# Patient Record
Sex: MALE | Race: WHITE | NOT HISPANIC OR LATINO | Employment: OTHER | URBAN - NONMETROPOLITAN AREA
[De-identification: names, ages, dates, MRNs, and addresses within clinical notes are randomized per-mention and may not be internally consistent; named-entity substitution may affect disease eponyms.]

---

## 2018-02-15 ENCOUNTER — OFFICE VISIT CONVERTED (OUTPATIENT)
Dept: FAMILY MEDICINE CLINIC | Age: 83
End: 2018-02-15
Attending: NURSE PRACTITIONER

## 2018-12-07 ENCOUNTER — OFFICE VISIT CONVERTED (OUTPATIENT)
Dept: FAMILY MEDICINE CLINIC | Age: 83
End: 2018-12-07
Attending: NURSE PRACTITIONER

## 2019-03-15 ENCOUNTER — HOSPITAL ENCOUNTER (OUTPATIENT)
Dept: RADIATION ONCOLOGY | Facility: HOSPITAL | Age: 84
Discharge: HOME | End: 2019-03-15
Attending: RADIOLOGY

## 2019-03-15 LAB — PSA SERPL-MCNC: 1.11 NG/ML (ref 0–4)

## 2019-05-10 ENCOUNTER — OFFICE VISIT CONVERTED (OUTPATIENT)
Dept: CARDIOLOGY | Facility: CLINIC | Age: 84
End: 2019-05-10
Attending: INTERNAL MEDICINE

## 2020-06-03 ENCOUNTER — HOSPITAL ENCOUNTER (OUTPATIENT)
Dept: RADIATION ONCOLOGY | Facility: HOSPITAL | Age: 85
Discharge: HOME | End: 2020-06-03
Attending: RADIOLOGY

## 2020-06-03 LAB — PSA SERPL-MCNC: 1.21 NG/ML (ref 0–4)

## 2021-05-15 VITALS
SYSTOLIC BLOOD PRESSURE: 131 MMHG | BODY MASS INDEX: 27.77 KG/M2 | HEART RATE: 79 BPM | HEIGHT: 70 IN | DIASTOLIC BLOOD PRESSURE: 62 MMHG | WEIGHT: 194 LBS

## 2021-05-18 NOTE — PROGRESS NOTES
Magan Lubin 1935     Office/Outpatient Visit    Visit Date: Thu, Feb 15, 2018 01:34 pm    Provider: Jo Ann Friend N.P. (Assistant: Amanda Ruiz RN)    Location: [facility]        Electronically signed by Jo Ann Friend N.P. on  02/19/2018 09:58:08 AM                             SUBJECTIVE:        CC:     Aubrey is a 82 year old White male.  Episode of dizziness this morning (DAUGHTER TO CALL US BACK WHERE AND WHEN PT HAD FLU VAC AND PREVNAR)         HPI:         Dizziness noted.  this am at home came out of bathroom after urinating.  picked up newspaper and began to look at it.  felt lightheaded and sat in nearby chair.  room spinning sensation lasted less than 5 minutes.  denies currently.  not sure what caused episode.  he had not eaten yet at that time.  denies nausea, vomiting, fever, recent illness, chest pain, or other symptoms.      ROS:     CONSTITUTIONAL:  Negative for chills, fatigue, fever, and weight change.      EYES:  Negative for blurred vision, eye pain, and photophobia.      E/N/T:  Positive for nasal congestion.   Negative for ear pain, diminished hearing, tinnitus, frequent rhinorrhea or sore throat.      CARDIOVASCULAR:  Positive for dizziness.   Negative for chest pain, palpitations or pedal edema.      RESPIRATORY:  Negative for cough, dyspnea, and hemoptysis.      GASTROINTESTINAL:  Negative for abdominal pain, heartburn, constipation, diarrhea, and stool changes.      GENITOURINARY:  Negative for dysuria.      MUSCULOSKELETAL:  Negative for arthralgias, back pain, and myalgias.      NEUROLOGICAL:  Positive for dizziness and paresthesia ( right lower thigh since knee replacement stable ).   Negative for fainting, headaches, tremor or weakness.          PMH/FMH/SH:     Last Reviewed on 10/19/2017 12:10 PM by Jo Ann Friend    Past Medical History:             PAST MEDICAL HISTORY         Prostate cancer: s/p XRT; released from urology 2016         PAST MEDICAL  "HISTORY         Positive for    Skin cancer: Basal Cell; ;         Surgical History:         Appendectomy: ;     Biopsy of skin; benign; \"fatty tumor on back\"    Hernia Repair    Joint Replacement: left knee; , ; uncomplicated;      open repair incarcerated unbilical hernia 2018;         Family History:     Father:  at age 65;  Cancer (unspecified type) (possible lung or colon)     Mother:  at age 94; Cause of death was age     Sister(s): 4 sister(s) total;  Cancer (unspecified type); Breast Cancer         Social History:         Marital Status:          Tobacco/Alcohol/Supplements:     Last Reviewed on 10/19/2017 11:54 AM by Mariposa Smith    Tobacco: He has a past history of cigarette smoking; quit date:  .          Alcohol: Frequency: Socially             Current Problems:     Artificial joint replacement, Knee     Osteoarthritis of knee     Elevated PSA     Urinary frequency     Elevated fasting glucose         Immunizations:     Fluzone (3 + years dose) 2009     Fluzone High-Dose pf (>=65 yr) 2013     Fluzone High-Dose pf (>=65 yr) 2015     Influenza High-Dose Virus Vaccine pf (>=65 yr) 10/1/2016     PNEUMOVAX 23 (Pneumococcal PPV23) 2013         Allergies:     Last Reviewed on 2/15/2018 01:36 PM by Amanda Ruiz    Penicillins:        Current Medications:     Last Reviewed on 2/15/2018 01:36 PM by Amanda Ruiz    Terazosin 2mg Capsules one a day     Aspirin (ASA) 81mg Chewable Tablet Chew 1 tablet(s) by mouth qam         OBJECTIVE:        Vitals:         Historical:     10/19/2017  BP:   94/51 mm Hg ( (left arm, , sitting, );)     10/19/2017  Wt:   189.4lbs        Current: 2/15/2018 1:36:13 PM    Ht:  5 ft, 9 in;  Wt: 189.5 lbs;  BMI: 28.0    T: 97.4 F (oral);  BP: 125/67 mm Hg (left arm, sitting);  P: 72 bpm (left arm (BP Cuff), sitting)        Exams:     PHYSICAL EXAM:     GENERAL:  well developed and nourished; appropriately groomed; in no " apparent distress;     E/N/T: EARS: left TM is normal and the right TM is has fluid behind it;  NOSE: nasal mucosa is erythematous;  OROPHARYNX: posterior pharynx, including tonsils, tongue, and uvula are normal;     RESPIRATORY: normal respiratory rate and pattern with no distress; normal breath sounds with no rales, rhonchi, wheezes or rubs;     CARDIOVASCULAR: normal rate; rhythm is regular;     LYMPHATIC: no enlargement of cervical or facial nodes;     MUSCULOSKELETAL:  Normal range of motion, strength and tone;     NEUROLOGIC: mental status: alert and oriented x 3; GROSSLY INTACT     PSYCHIATRIC:  appropriate affect and demeanor; normal speech pattern; grossly normal memory;         Lab/Test Results:         LABORATORY RESULTS: EKG performed by atc         ASSESSMENT           780.4   R42  Dizziness              DDx:     780.2   R42  Near-syncope              DDx:     V77.91   Z13.220  Screening for cholesterol level              DDx:         ORDERS:         Meds Prescribed:       Meclizine HCl 25mg Tablet 1/2 - 1 tab BID prn dizziness  #20 (Twenty) tablet(s) Refills: 0         Radiology/Test Orders:       [id number]  12+ -lead ECG tracing w/interp, report  (In-House)           Lab Orders:       [id number]  [facility] Lipid Panel  (Send-Out)         32128  [facility] Basic Metabolic Panel  (Send-Out)                   PLAN:          Dizziness     LABORATORY:  Labs ordered to be performed today include basic metabolic panel.      RECOMMENDATIONS given include: limit salt in diet, rest whenever possible while symptoms persist, no baths unattended, use caution when driving, and meclizine may cause drowsiness.  add flonase nasal spray daily x 4 wks to help with serous otitis.  to ER if chest pain, unilateral weakness or numbness, or any other problems.  ekg consistent to 2013 findings.  consider stress test and echocardiogram if persists..            Prescriptions:       Meclizine HCl 25mg Tablet 1/2 - 1 tab BID prn dizziness   #20 (Twenty) tablet(s) Refills: 0           Orders:       [id number]  [facility] Basic Metabolic Panel  (Send-Out)             Patient Education Handouts:       Vertigo           Near-syncope as above.           Orders:       [id number]  12+ -lead ECG tracing w/interp, report  (In-House)            Screening for cholesterol level     LABORATORY:  Labs ordered to be performed today include lipid panel.            Orders:       [id number]  [facility] Lipid Panel  (Send-Out)               Patient Recommendations:        For  Dizziness:     Get plenty of rest.    Do not take a bath unattended for risk of falling or drowning. Use caution when driving.              CHARGE CAPTURE           **Please note: ICD descriptions below are intended for billing purposes only and may not represent clinical diagnoses**        Primary Diagnosis:         780.4 Dizziness            R42    Dizziness and giddiness              Orders:          13080   Office/outpatient visit; established patient, level 3  (In-House)           780.2 Near-syncope            R42    Dizziness and giddiness              Orders:          51236   12+ -lead ECG tracing w/interp, report  (In-House)           V77.91 Screening for cholesterol level            Z13.220    Encounter for screening for lipoid disorders

## 2021-05-18 NOTE — PROGRESS NOTES
"AmeeMagan 1935     Office/Outpatient Visit    Visit Date: Fri, Dec 7, 2018 03:10 pm    Provider: Jo Ann Friend N.P. (Assistant: John Townsend)    Location: [facility]        Electronically signed by Jo Ann Friend N.P. on  12/10/2018 10:42:17 AM                             SUBJECTIVE:        CC:     Aubrey is a 83 year old White male.  blood in urine         HPI:         noted blood in urine twice yesterday.  passed a small , hard substance.  negative for back pain, abd pain, or any discolored urine today.  [facility]  has not seen this calendar year yet.  denies hx of kidney stones.  states he had radiation for prostate cancer and prostate gland was not surgically removed.      ROS:     CONSTITUTIONAL:  Negative for chills, fatigue, fever, and weight change.      CARDIOVASCULAR:  Negative for chest pain, palpitations, tachycardia, orthopnea, and edema.      RESPIRATORY:  Negative for cough, dyspnea, and hemoptysis.      GASTROINTESTINAL:  Negative for abdominal pain, heartburn, constipation, diarrhea, and stool changes.      GENITOURINARY:  Positive for hematuria.   Negative for dysuria, urinary incontinence or change in urine stream.      MUSCULOSKELETAL:  Negative for back pain.      NEUROLOGICAL:  Negative for dizziness, headaches, paresthesias, and weakness.          PMH/FMH/SH:     Last Reviewed on 2018 03:32 PM by Jo Ann Friend    Past Medical History:             PAST MEDICAL HISTORY         Prostate cancer: s/p XRT; released from urology          PAST MEDICAL HISTORY         Positive for    Skin cancer: Basal Cell; ;         Surgical History:         Appendectomy: ;     Biopsy of skin; benign; \"fatty tumor on back\"    Hernia Repair    Joint Replacement: left knee; , ; uncomplicated;      open repair incarcerated unbilical hernia 2018;         Family History:     Father:  at age 65;  Cancer (unspecified type) (possible " lung or colon)     Mother:  at age 94; Cause of death was age     Sister(s): 4 sister(s) total;  Cancer (unspecified type); Breast Cancer         Social History:         Marital Status:          Tobacco/Alcohol/Supplements:     Last Reviewed on 2018 03:14 PM by John Townsend    Tobacco: He has a past history of cigarette smoking; quit date:  .          Alcohol: Frequency: Socially             Current Problems:     Screening for cholesterol level     Near-syncope     Dizziness     Artificial joint replacement, Knee     Osteoarthritis of knee     Elevated PSA     Urinary frequency     Elevated fasting glucose         Immunizations:     Fluzone (3 + years dose) 2009     Fluzone High-Dose pf (>=65 yr) 2013     Fluzone High-Dose pf (>=65 yr) 2015     Influenza High-Dose Virus Vaccine pf (>=65 yr) 10/1/2016     PNEUMOVAX 23 (Pneumococcal PPV23) 2013         Allergies:     Last Reviewed on 2/15/2018 01:36 PM by Amanda Ruiz    Penicillins:        Current Medications:     Last Reviewed on 2018 03:14 PM by John Townsend    Meclizine HCl 25mg Tablet 1/2 - 1 tab BID prn dizziness     Terazosin 2mg Capsules one a day     Aspirin (ASA) 81mg Chewable Tablet Chew 1 tablet(s) by mouth qam         OBJECTIVE:        Vitals:         Historical:     02/15/2018  BP:   125/67 mm Hg ( (left arm, , sitting, );)     02/15/2018  Wt:   189.5lbs        Current: 2018 3:16:31 PM    Ht:  5 ft, 9 in;  Wt: 193.4 lbs;  BMI: 28.6    T: 97.6 F (oral);  BP: 126/63 mm Hg (left arm, sitting);  P: 63 bpm (left arm (BP Cuff), sitting);  sCr: 0.89 mg/dL;  GFR: 65.07        Exams:     PHYSICAL EXAM:     GENERAL:  well developed and nourished; appropriately groomed; in no apparent distress;     RESPIRATORY: normal respiratory rate and pattern with no distress; normal breath sounds with no rales, rhonchi, wheezes or rubs;     CARDIOVASCULAR: normal rate; rhythm is regular;     GASTROINTESTINAL: rectal  exam: normal tone; no masses; no hemorrhoids; guaiac negative stool;     GENITOURINARY: prostate: no enlargement;     MUSCULOSKELETAL:  Normal range of motion, strength and tone;     NEUROLOGIC: mental status: alert and oriented x 3; GROSSLY INTACT     PSYCHIATRIC:  appropriate affect and demeanor; normal speech pattern; grossly normal memory;         ASSESSMENT           Urine: red - blood    599.70   R31.9  Hematuria, unspecified              DDx:         ORDERS:         Radiology/Test Orders:       [id number]  Radiologic examination, abdomen; single anteroposterior view  (Send-Out)           Lab Orders:       [id number]  Urinalysis, automated, without microscopy  (In-House)         95237  [facility] Urine Culture  (Send-Out)         82702  [facility] CBC with 3 part diff  (Send-Out)                   PLAN:          Hematuria, unspecified     LABORATORY:  Labs ordered to be performed today include CBC.      RADIOLOGY:  I have ordered a KUB to be done today.      RECOMMENDATIONS given include: increase fluid intake and denies any other bleeding.  to ER if worsens.  small amt blood in urine today.   tests pending and follow up with urology..            Orders:       [id number]  Urinalysis, automated, without microscopy  (In-House)         [id number]  [facility] Urine Culture  (Send-Out)         46383  Radiologic examination, abdomen; single anteroposterior view  (Send-Out)         61625  [facility] CBC with 3 part diff  (Send-Out)               Patient Recommendations:        For  Hematuria, unspecified:     Drink plenty of fluids.  Fever increases the loss of fluids and can lead to dehydration.              CHARGE CAPTURE           **Please note: ICD descriptions below are intended for billing purposes only and may not represent clinical diagnoses**        Primary Diagnosis:         Urine: red - blood    599.70 Hematuria, unspecified            R31.9    Hematuria, unspecified              Orders:          68350   Office/outpatient  visit; established patient, level 3  (In-House)             95205   Urinalysis, automated, without microscopy  (In-House)

## 2021-07-01 VITALS
DIASTOLIC BLOOD PRESSURE: 63 MMHG | WEIGHT: 193.4 LBS | SYSTOLIC BLOOD PRESSURE: 126 MMHG | HEART RATE: 63 BPM | BODY MASS INDEX: 28.64 KG/M2 | HEIGHT: 69 IN | TEMPERATURE: 97.6 F

## 2021-07-01 VITALS
DIASTOLIC BLOOD PRESSURE: 67 MMHG | HEART RATE: 72 BPM | SYSTOLIC BLOOD PRESSURE: 125 MMHG | WEIGHT: 189.5 LBS | BODY MASS INDEX: 28.07 KG/M2 | HEIGHT: 69 IN | TEMPERATURE: 97.4 F

## 2022-01-28 ENCOUNTER — OFFICE VISIT (OUTPATIENT)
Dept: FAMILY MEDICINE CLINIC | Age: 87
End: 2022-01-28

## 2022-01-28 ENCOUNTER — TELEPHONE (OUTPATIENT)
Dept: FAMILY MEDICINE CLINIC | Age: 87
End: 2022-01-28

## 2022-01-28 VITALS
HEART RATE: 70 BPM | WEIGHT: 147 LBS | BODY MASS INDEX: 21.77 KG/M2 | SYSTOLIC BLOOD PRESSURE: 132 MMHG | OXYGEN SATURATION: 95 % | TEMPERATURE: 97.7 F | HEIGHT: 69 IN | DIASTOLIC BLOOD PRESSURE: 63 MMHG

## 2022-01-28 DIAGNOSIS — T63.301A SPIDER BITE WOUND, ACCIDENTAL OR UNINTENTIONAL, INITIAL ENCOUNTER: Primary | ICD-10-CM

## 2022-01-28 DIAGNOSIS — T63.301A SPIDER BITE WOUND, ACCIDENTAL OR UNINTENTIONAL, INITIAL ENCOUNTER: ICD-10-CM

## 2022-01-28 PROCEDURE — 99212 OFFICE O/P EST SF 10 MIN: CPT | Performed by: NURSE PRACTITIONER

## 2022-01-28 RX ORDER — SULFAMETHOXAZOLE AND TRIMETHOPRIM 800; 160 MG/1; MG/1
1 TABLET ORAL 2 TIMES DAILY
Qty: 10 TABLET | Refills: 0 | Status: SHIPPED | OUTPATIENT
Start: 2022-01-28 | End: 2022-01-31 | Stop reason: SDUPTHER

## 2022-01-28 NOTE — PROGRESS NOTES
"Chief Complaint  Insect Bite (underneath left eye)    Subjective    Patient is a 86 year old who was bite by small white spider 3 days ago. Patient reports area was draining but has now scabbed over. Area is very tender to tough. Denies fever or changes in vision at this time.          Magan Lubin presents to [facility]  Tick Removal  This is a new problem. The current episode started in the past 7 days. Pertinent negatives include no chills or fever.       Objective   Vital Signs:   /63 (BP Location: Right arm, Patient Position: Sitting, Cuff Size: Adult)   Pulse 70   Temp 97.7 °F (36.5 °C) (Oral)   Ht 175.3 cm (69\")   Wt 66.7 kg (147 lb)   SpO2 95%   BMI 21.71 kg/m²     Physical Exam  HENT:      Head: Normocephalic.   Cardiovascular:      Rate and Rhythm: Normal rate.   Pulmonary:      Effort: Pulmonary effort is normal.      Breath sounds: Normal breath sounds.   Skin:     General: Skin is warm and dry.      Findings: Erythema present.          Neurological:      Mental Status: He is alert and oriented to person, place, and time.   Psychiatric:         Mood and Affect: Mood normal.                Media Information                 Result Review :                 Assessment and Plan    Diagnoses and all orders for this visit:    1. Spider bite wound, accidental or unintentional, initial encounter (Primary)  Comments:  Monitor area for increased redness, warmth.  Follow-up on Monday if no improvement  Orders:  -     sulfamethoxazole-trimethoprim (Bactrim DS) 800-160 MG per tablet; Take 1 tablet by mouth 2 (Two) Times a Day for 5 days.  Dispense: 10 tablet; Refill: 0        Follow Up   Return in about 3 days (around 1/31/2022), or if symptoms worsen or fail to improve.  Patient was given instructions and counseling regarding his condition or for health maintenance advice. Please see specific information pulled into the AVS if appropriate.       "

## 2022-02-02 ENCOUNTER — TELEPHONE (OUTPATIENT)
Dept: FAMILY MEDICINE CLINIC | Age: 87
End: 2022-02-02

## 2022-02-02 RX ORDER — SULFAMETHOXAZOLE AND TRIMETHOPRIM 800; 160 MG/1; MG/1
1 TABLET ORAL 2 TIMES DAILY
Qty: 6 TABLET | Refills: 0 | Status: SHIPPED | OUTPATIENT
Start: 2022-02-02 | End: 2022-02-05

## 2022-02-02 NOTE — TELEPHONE ENCOUNTER
Caller: JONTE    Relationship: Emergency Contact    Best call back number: [phone]    Requested Prescriptions:   Requested Prescriptions      No prescriptions requested or ordered in this encounter        Pharmacy where request should be sent: [facility] - [address] [phone] [facility] [phone] FX     Additional details provided by patient: PATIENT RECEIVED MEDICATION FOR A SPIDER BITE THAT HAS NOT FULLY RECOVERED. THEY ARE REQUESTING ANOTHER REFILL OF MEDICATION    Does the patient have less than a 3 day supply:  [x] Yes  [] No    Kavya Trevizo Rep   02/02/22 13:50 EST

## 2022-03-11 ENCOUNTER — OFFICE VISIT (OUTPATIENT)
Dept: FAMILY MEDICINE CLINIC | Age: 87
End: 2022-03-11

## 2022-03-11 ENCOUNTER — HOSPITAL ENCOUNTER (OUTPATIENT)
Dept: CT IMAGING | Facility: HOSPITAL | Age: 87
Discharge: HOME | End: 2022-03-11
Admitting: NURSE PRACTITIONER

## 2022-03-11 VITALS
SYSTOLIC BLOOD PRESSURE: 133 MMHG | HEART RATE: 64 BPM | WEIGHT: 187 LBS | DIASTOLIC BLOOD PRESSURE: 75 MMHG | OXYGEN SATURATION: 95 % | HEIGHT: 69 IN | BODY MASS INDEX: 27.7 KG/M2

## 2022-03-11 DIAGNOSIS — R51.9 ACUTE NONINTRACTABLE HEADACHE, UNSPECIFIED HEADACHE TYPE: Primary | ICD-10-CM

## 2022-03-11 DIAGNOSIS — R51.9 ACUTE NONINTRACTABLE HEADACHE, UNSPECIFIED HEADACHE TYPE: ICD-10-CM

## 2022-03-11 PROCEDURE — 99213 OFFICE O/P EST LOW 20 MIN: CPT | Performed by: NURSE PRACTITIONER

## 2022-03-11 PROCEDURE — 70450 CT HEAD/BRAIN W/O DYE: CPT

## 2022-03-11 RX ORDER — TERAZOSIN 2 MG/1
2 CAPSULE ORAL NIGHTLY
COMMUNITY
Start: 2022-03-02 | End: 2022-08-25 | Stop reason: SDUPTHER

## 2022-03-11 RX ORDER — ASPIRIN 81 MG/1
81 TABLET ORAL DAILY
COMMUNITY

## 2022-03-11 NOTE — PROGRESS NOTES
"Chief Complaint  Headache (Sharp pain in head for 3-4 days )    Subjective  Patient is an 86-year-old male who is here today to discuss intermittent sharp headache pain.  He first noticed it 4 days ago with no particular trigger.  Sharp pain on the top of his head that lasts for about 1 minute.  He is not in pain at time of exam today.  When the pain occurs it is moderate to severe.  He denies any unilateral weakness or numbness, slurred speech, numbness or tingling, vision changes, or having the worst headache of his life.  He takes aspirin 81 mg intermittently.        Magan Lubin presents to [facility]    Review of Systems   Constitutional: Negative.    Respiratory: Negative.    Cardiovascular: Negative.    Musculoskeletal: Negative.    Neurological: Negative for dizziness, tremors, seizures, syncope, facial asymmetry, speech difficulty, weakness, light-headedness and numbness.        Objective   Vital Signs:   Vitals:    03/11/22 1504   BP: 133/75   BP Location: Right arm   Patient Position: Sitting   Cuff Size: Adult   Pulse: 64   SpO2: 95%   Weight: 84.8 kg (187 lb)   Height: 175.3 cm (69\")      Physical Exam  Vitals reviewed.   Constitutional:       General: He is not in acute distress.     Appearance: Normal appearance. He is well-developed.   HENT:      Right Ear: Tympanic membrane normal.      Left Ear: A middle ear effusion is present.   Eyes:      Extraocular Movements: Extraocular movements intact.      Pupils: Pupils are equal, round, and reactive to light.   Cardiovascular:      Rate and Rhythm: Normal rate and regular rhythm.      Heart sounds: Normal heart sounds.   Pulmonary:      Effort: Pulmonary effort is normal.      Breath sounds: Normal breath sounds.   Musculoskeletal:      Right lower leg: No edema.      Left lower leg: No edema.   Skin:     General: Skin is warm and dry.   Neurological:      General: No focal deficit present.      Mental Status: He is " alert.   Psychiatric:         Attention and Perception: Attention normal.         Mood and Affect: Mood and affect normal.         Behavior: Behavior normal.          Result Review :                Assessment and Plan    Diagnoses and all orders for this visit:    1. Acute nonintractable headache, unspecified headache type (Primary)  Assessment & Plan:  Pain is not current at this time.  But new complaint of intermittent sharp headache pain.  Need to obtain CT of the head without contrast.  He is instructed to go to the nearest emergency room or call 911 in the meantime if his symptoms worsen, if he has the worst headache of his life.  Experiences unilateral weakness or numbness, or slurred speech.  Further treatment are pending head CT results.  If CT is negative consider other factors such as nasal congestion runny nose, neck pain or strain.  May take extra strength Tylenol as needed.  Does have some mild eustachian tube inflammation due to fluid behind the left tympanic membrane.  He also reports some seasonal allergies.  Okay to take plain Claritin.    Orders:  -     CT Head Without Contrast; Future      Follow Up    No follow-ups on file.  Patient was given instructions and counseling regarding his condition or for health maintenance advice. Please see specific information pulled into the AVS if appropriate.

## 2022-03-11 NOTE — PROGRESS NOTES
No acute findings.  Attempted to call result to patient.  No answer.  Left message to call office.  He needs to follow up with vascular about stenosis of carotid arteries.

## 2022-03-11 NOTE — ASSESSMENT & PLAN NOTE
Pain is not current at this time.  But new complaint of intermittent sharp headache pain.  Need to obtain CT of the head without contrast.  He is instructed to go to the nearest emergency room or call 911 in the meantime if his symptoms worsen, if he has the worst headache of his life.  Experiences unilateral weakness or numbness, or slurred speech.  Further treatment are pending head CT results.  If CT is negative consider other factors such as nasal congestion runny nose, neck pain or strain.  May take extra strength Tylenol as needed.  Does have some mild eustachian tube inflammation due to fluid behind the left tympanic membrane.  He also reports some seasonal allergies.  Okay to take plain Claritin.

## 2022-03-13 DIAGNOSIS — R51.9 ACUTE NONINTRACTABLE HEADACHE, UNSPECIFIED HEADACHE TYPE: Primary | ICD-10-CM

## 2022-03-13 DIAGNOSIS — I65.23 CAROTID ARTERY CALCIFICATION, BILATERAL: ICD-10-CM

## 2022-03-13 NOTE — PROGRESS NOTES
Please call patient.  No acute changes.  Marked carotid artery calcifications.  I have ordered referral to vascular to further evaluate.  He  has seen vascular about carotid arteries in the past but needs to see again.  Intend for him to be seen within one week if possible.

## 2022-03-14 ENCOUNTER — TELEPHONE (OUTPATIENT)
Dept: FAMILY MEDICINE CLINIC | Age: 87
End: 2022-03-14

## 2022-03-14 NOTE — TELEPHONE ENCOUNTER
Caller: Magan Lubin    Relationship: Self    Best call back number: [phone]    What is the best time to reach you: ANYTIME    Who are you requesting to speak with (clinical staff, provider,  specific staff member): VIKTOR    What was the call regarding: PATIENTS DAUGHTER STATES VASCULAR SURGEON. NEED HER TO SEND PERTINENT INFORMATION.    DR:     FRANKIE MAYER  APPT: Thursday 4/7/2022 AT 9 AM  FAX:    [phone]    Do you require a callback: IF NEEDED

## 2022-03-17 ENCOUNTER — HOSPITAL ENCOUNTER (OUTPATIENT)
Dept: CARDIOLOGY | Facility: HOSPITAL | Age: 87
Discharge: HOME | End: 2022-03-17
Admitting: NURSE PRACTITIONER

## 2022-03-17 ENCOUNTER — TELEPHONE (OUTPATIENT)
Dept: FAMILY MEDICINE CLINIC | Age: 87
End: 2022-03-17

## 2022-03-17 DIAGNOSIS — R51.9 ACUTE NONINTRACTABLE HEADACHE, UNSPECIFIED HEADACHE TYPE: ICD-10-CM

## 2022-03-17 DIAGNOSIS — R51.9 ACUTE NONINTRACTABLE HEADACHE, UNSPECIFIED HEADACHE TYPE: Primary | ICD-10-CM

## 2022-03-17 DIAGNOSIS — I79.8 OTHER DISORDERS OF ARTERIES, ARTERIOLES AND CAPILLARIES IN DISEASES CLASSIFIED ELSEWHERE: ICD-10-CM

## 2022-03-17 DIAGNOSIS — I65.29 CAROTID ARTERY CALCIFICATION, UNSPECIFIED LATERALITY: ICD-10-CM

## 2022-03-17 DIAGNOSIS — I72.0 CAROTID ARTERY ANEURYSM: ICD-10-CM

## 2022-03-17 LAB
BH CV XLRA MEAS LEFT CAROTID BULB EDV: 9 CM/SEC
BH CV XLRA MEAS LEFT CAROTID BULB PSV: 40 CM/SEC
BH CV XLRA MEAS LEFT DIST CCA EDV: 20 CM/SEC
BH CV XLRA MEAS LEFT DIST CCA PSV: 82 CM/SEC
BH CV XLRA MEAS LEFT DIST ICA EDV: 16 CM/SEC
BH CV XLRA MEAS LEFT DIST ICA PSV: 45 CM/SEC
BH CV XLRA MEAS LEFT MID ICA EDV: 15 CM/SEC
BH CV XLRA MEAS LEFT MID ICA PSV: 75 CM/SEC
BH CV XLRA MEAS LEFT PROX CCA EDV: 17 CM/SEC
BH CV XLRA MEAS LEFT PROX CCA PSV: 109 CM/SEC
BH CV XLRA MEAS LEFT PROX ECA EDV: 13 CM/SEC
BH CV XLRA MEAS LEFT PROX ECA PSV: 94 CM/SEC
BH CV XLRA MEAS LEFT PROX ICA EDV: 66 CM/SEC
BH CV XLRA MEAS LEFT PROX ICA PSV: 207 CM/SEC
BH CV XLRA MEAS LEFT VERTEBRAL A EDV: 11 CM/SEC
BH CV XLRA MEAS LEFT VERTEBRAL A PSV: 42 CM/SEC
BH CV XLRA MEAS RIGHT CAROTID BULB EDV: 25 CM/SEC
BH CV XLRA MEAS RIGHT CAROTID BULB PSV: 104 CM/SEC
BH CV XLRA MEAS RIGHT DIST CCA EDV: 19 CM/SEC
BH CV XLRA MEAS RIGHT DIST CCA PSV: 82 CM/SEC
BH CV XLRA MEAS RIGHT DIST ICA EDV: 22 CM/SEC
BH CV XLRA MEAS RIGHT DIST ICA PSV: 69 CM/SEC
BH CV XLRA MEAS RIGHT MID ICA EDV: 21 CM/SEC
BH CV XLRA MEAS RIGHT MID ICA PSV: 72 CM/SEC
BH CV XLRA MEAS RIGHT PROX CCA EDV: 15 CM/SEC
BH CV XLRA MEAS RIGHT PROX CCA PSV: 119 CM/SEC
BH CV XLRA MEAS RIGHT PROX ECA EDV: 11 CM/SEC
BH CV XLRA MEAS RIGHT PROX ECA PSV: 112 CM/SEC
BH CV XLRA MEAS RIGHT PROX ICA EDV: 39 CM/SEC
BH CV XLRA MEAS RIGHT PROX ICA PSV: 119 CM/SEC
BH CV XLRA MEAS RIGHT VERTEBRAL A EDV: 9 CM/SEC
BH CV XLRA MEAS RIGHT VERTEBRAL A PSV: 46 CM/SEC
LEFT ARM BP: NORMAL MMHG
MAXIMAL PREDICTED HEART RATE: 136 BPM
RIGHT ARM BP: NORMAL MMHG
STRESS TARGET HR: 116 BPM

## 2022-03-17 PROCEDURE — 93880 EXTRACRANIAL BILAT STUDY: CPT

## 2022-03-17 PROCEDURE — 93880 EXTRACRANIAL BILAT STUDY: CPT | Performed by: SURGERY

## 2022-03-17 NOTE — TELEPHONE ENCOUNTER
Caller: TE WEBB    Relationship: Emergency Contact    Best call back number: [phone]    What is the best time to reach you: ANYTIME    Who are you requesting to speak with (clinical staff, provider,  specific staff member): VIKTOR    What was the call regarding: PATIENTS WIFE STATES HER  HAS ARTERIAL BLOCKAGE WHICH WAS CONFIRMED BY [facility]. PATIENTS WIFE STATES SHE WOULD LIKE A CALL BACK TO DISCUSS PATIENTS FUTURE CARE.    Do you require a callback: YES

## 2022-03-18 NOTE — PROGRESS NOTES
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  No stenosis of right carotid.  Heavy plaque build up at bifurcation.  Ok to see if vascular could see him sooner or refer to different vascular provider.

## 2022-03-24 ENCOUNTER — TELEPHONE (OUTPATIENT)
Dept: FAMILY MEDICINE CLINIC | Age: 87
End: 2022-03-24

## 2022-03-24 NOTE — TELEPHONE ENCOUNTER
.  Caller: TE WEBB    Relationship: Emergency Contact    Best call back number: [phone]    What is the best time to reach you: ANYTIME    Who are you requesting to speak with (clinical staff, provider,  specific staff member): CLINICAL    What was the call regarding: PATIENT'S WIFE CALLED SAYING THAT SHE RECEIVED LETTER FROM OFFICE FOR PATIENT REGARDING GETTING AN APPOINTMENT SCHEDULED WITH A VASCULAR DOCTOR. HE IS ALREADY SCHEDULED WITH ONE IN [place] AND HE IS SCHEDULED FOR 2:40 ON 03/29/2022.    Do you require a callback: IF NEEDED

## 2022-04-06 DIAGNOSIS — I65.23 BILATERAL CAROTID ARTERY STENOSIS: Primary | ICD-10-CM

## 2022-04-18 ENCOUNTER — TELEPHONE (OUTPATIENT)
Dept: FAMILY MEDICINE CLINIC | Age: 87
End: 2022-04-18

## 2022-04-18 NOTE — TELEPHONE ENCOUNTER
----- Message from Love Bejarano LPN sent at 4/18/2022  9:02 AM EDT -----      ----- Message -----  From: SYSTEM  Sent: 4/16/2022   1:13 AM EDT  To: [facility] [place] [facility] [place]

## 2022-04-18 NOTE — TELEPHONE ENCOUNTER
Called patient no answer, unable to leave voicemail per verbal release.     Okay to tell patient he is over due for lab work. luke

## 2022-04-18 NOTE — TELEPHONE ENCOUNTER
Spoke with patient wife, states they have had a lot going on but will have patient to complete overdue labs within the next 1.5 weeks. luke

## 2022-04-18 NOTE — TELEPHONE ENCOUNTER
----- Message from Love Bejarano LPN sent at 4/18/2022  9:02 AM EDT -----      ----- Message -----  From: SYSTEM  Sent: 4/16/2022   1:13 AM EDT  To: [facility] [place] [facility] [place]

## 2022-04-22 ENCOUNTER — TELEPHONE (OUTPATIENT)
Dept: FAMILY MEDICINE CLINIC | Age: 87
End: 2022-04-22

## 2022-04-22 NOTE — TELEPHONE ENCOUNTER
Caller: TE WEBB    Relationship: Emergency Contact    Best call back number: [phone]         What was the call regarding: TE REPORTS THAT PATIENT WILL GET LAB WORK FIRST WEEK OF MAY -- PATIENT HAS BEEN HELPING HER WITH HER OWN MEDICAL ISSUES    Do you require a callback: NO

## 2022-06-02 ENCOUNTER — HOSPITAL ENCOUNTER (OUTPATIENT)
Dept: GENERAL RADIOLOGY | Facility: HOSPITAL | Age: 87
Discharge: HOME | End: 2022-06-02
Admitting: NURSE PRACTITIONER

## 2022-06-02 ENCOUNTER — OFFICE VISIT (OUTPATIENT)
Dept: FAMILY MEDICINE CLINIC | Age: 87
End: 2022-06-02

## 2022-06-02 VITALS
SYSTOLIC BLOOD PRESSURE: 141 MMHG | WEIGHT: 183 LBS | DIASTOLIC BLOOD PRESSURE: 84 MMHG | HEART RATE: 70 BPM | TEMPERATURE: 98.8 F | BODY MASS INDEX: 27.02 KG/M2 | OXYGEN SATURATION: 97 %

## 2022-06-02 DIAGNOSIS — J06.9 UPPER RESPIRATORY TRACT INFECTION, UNSPECIFIED TYPE: Primary | ICD-10-CM

## 2022-06-02 DIAGNOSIS — R05.9 COUGH: ICD-10-CM

## 2022-06-02 LAB
EXPIRATION DATE: NORMAL
FLUAV AG NPH QL: NEGATIVE
FLUBV AG NPH QL: NEGATIVE
INTERNAL CONTROL: NORMAL
Lab: NORMAL

## 2022-06-02 PROCEDURE — 87804 INFLUENZA ASSAY W/OPTIC: CPT | Performed by: NURSE PRACTITIONER

## 2022-06-02 PROCEDURE — 99212 OFFICE O/P EST SF 10 MIN: CPT | Performed by: NURSE PRACTITIONER

## 2022-06-02 PROCEDURE — 71046 X-RAY EXAM CHEST 2 VIEWS: CPT

## 2022-06-02 RX ORDER — DOXYCYCLINE HYCLATE 100 MG/1
100 CAPSULE ORAL 2 TIMES DAILY
Qty: 20 CAPSULE | Refills: 0 | Status: SHIPPED | OUTPATIENT
Start: 2022-06-02 | End: 2022-06-12

## 2022-06-02 RX ORDER — ATORVASTATIN CALCIUM 10 MG/1
10 TABLET, FILM COATED ORAL DAILY
COMMUNITY
Start: 2022-03-30 | End: 2022-08-25 | Stop reason: SDUPTHER

## 2022-06-02 RX ORDER — DEXTROMETHORPHAN HYDROBROMIDE AND PROMETHAZINE HYDROCHLORIDE 15; 6.25 MG/5ML; MG/5ML
5 SYRUP ORAL 4 TIMES DAILY PRN
Qty: 118 ML | Refills: 0 | Status: SHIPPED | OUTPATIENT
Start: 2022-06-02 | End: 2022-08-25

## 2022-06-02 NOTE — PROGRESS NOTES
Chief Complaint  URI (Cough, congestion x 4 days, pt daughter is here with him today and states she done an at home covid test today and it was negative. )    Subjective        Magan Lubin presents to [facility]  URI   This is a new problem. The current episode started in the past 7 days. The problem has been gradually worsening. There has been no fever. Associated symptoms include coughing, rhinorrhea, sinus pain and a sore throat. Pertinent negatives include no ear pain, nausea or vomiting. He has tried acetaminophen for the symptoms. The treatment provided no relief.       Objective   Vital Signs:  /84 (BP Location: Left arm, Patient Position: Sitting)   Pulse 70   Temp 98.8 °F (37.1 °C) (Oral)   Wt 83 kg (183 lb)   SpO2 97%   BMI 27.02 kg/m²     BMI has not been calculated during today's encounter.       Physical Exam  HENT:      Head: Normocephalic.      Nose: Congestion present.      Mouth/Throat:      Pharynx: Posterior oropharyngeal erythema present. No oropharyngeal exudate.   Eyes:      General: Allergic shiner present.   Cardiovascular:      Rate and Rhythm: Normal rate and regular rhythm.   Pulmonary:      Effort: Pulmonary effort is normal. No respiratory distress.      Breath sounds: No stridor. Rhonchi present. No wheezing or rales.   Skin:     General: Skin is warm and dry.   Neurological:      Mental Status: He is alert and oriented to person, place, and time.   Psychiatric:         Mood and Affect: Mood normal.        Result Review :                Assessment and Plan   Diagnoses and all orders for this visit:    1. Upper respiratory tract infection, unspecified type (Primary)  Comments:  Rapid flu negative  Orders:  -     doxycycline (VIBRAMYCIN) 100 MG capsule; Take 1 capsule by mouth 2 (Two) Times a Day for 10 days.  Dispense: 20 capsule; Refill: 0    2. Cough  -     POCT Influenza A/B  -     XR Chest PA & Lateral; Future  -      promethazine-dextromethorphan (PROMETHAZINE-DM) 6.25-15 MG/5ML syrup; Take 5 mL by mouth 4 (Four) Times a Day As Needed for Cough.  Dispense: 118 mL; Refill: 0             Follow Up   Return if symptoms worsen or fail to improve.  Patient was given instructions and counseling regarding his condition or for health maintenance advice. Please see specific information pulled into the AVS if appropriate.

## 2022-06-03 ENCOUNTER — TELEPHONE (OUTPATIENT)
Dept: FAMILY MEDICINE CLINIC | Age: 87
End: 2022-06-03

## 2022-06-03 NOTE — TELEPHONE ENCOUNTER
Caller: TE WEBB    Relationship: Emergency Contact    Best call back number: [phone]        What test was performed: CHEST X-RAY    When was the test performed: 06/02/22    Where was the test performed: [facility]    Additional notes: THE PATIENT'S WIFE WOULD LIKE A CALL BACK TO DISCUSS TEST RESULTS ASAP

## 2022-06-03 NOTE — TELEPHONE ENCOUNTER
Inf wife that LJ is out of the office, but the xray didn't show any infection, but will call them next week with the actual interpretation from [facility].  She states that he is doing better today

## 2022-06-06 NOTE — PROGRESS NOTES
Please call and check on patient to see how he is feeling and if his shortness of breath has improved. I recommend two week follow up with PCP to discuss possible chest CT.

## 2022-06-07 ENCOUNTER — LAB (OUTPATIENT)
Dept: LAB | Facility: HOSPITAL | Age: 87
End: 2022-06-07

## 2022-06-07 DIAGNOSIS — I65.23 BILATERAL CAROTID ARTERY STENOSIS: ICD-10-CM

## 2022-06-07 LAB
ALBUMIN SERPL-MCNC: 4.1 G/DL (ref 3.5–5.2)
ALBUMIN/GLOB SERPL: 1.3 G/DL
ALP SERPL-CCNC: 88 U/L (ref 39–117)
ALT SERPL W P-5'-P-CCNC: 20 U/L (ref 1–41)
ANION GAP SERPL CALCULATED.3IONS-SCNC: 9.8 MMOL/L (ref 5–15)
AST SERPL-CCNC: 22 U/L (ref 1–40)
BILIRUB SERPL-MCNC: 0.5 MG/DL (ref 0–1.2)
BUN SERPL-MCNC: 21 MG/DL (ref 8–23)
BUN/CREAT SERPL: 23.3 (ref 7–25)
CALCIUM SPEC-SCNC: 9.7 MG/DL (ref 8.6–10.5)
CHLORIDE SERPL-SCNC: 105 MMOL/L (ref 98–107)
CHOLEST SERPL-MCNC: 123 MG/DL (ref 0–200)
CO2 SERPL-SCNC: 23.2 MMOL/L (ref 22–29)
CREAT SERPL-MCNC: 0.9 MG/DL (ref 0.76–1.27)
EGFRCR SERPLBLD CKD-EPI 2021: 84.2 ML/MIN/1.73
GLOBULIN UR ELPH-MCNC: 3.2 GM/DL
GLUCOSE SERPL-MCNC: 95 MG/DL (ref 65–99)
HDLC SERPL-MCNC: 40 MG/DL (ref 40–60)
LDLC SERPL CALC-MCNC: 71 MG/DL (ref 0–100)
LDLC/HDLC SERPL: 1.83 {RATIO}
POTASSIUM SERPL-SCNC: 4.7 MMOL/L (ref 3.5–5.2)
PROT SERPL-MCNC: 7.3 G/DL (ref 6–8.5)
SODIUM SERPL-SCNC: 138 MMOL/L (ref 136–145)
TRIGL SERPL-MCNC: 50 MG/DL (ref 0–150)
VLDLC SERPL-MCNC: 12 MG/DL (ref 5–40)

## 2022-06-07 PROCEDURE — 80053 COMPREHEN METABOLIC PANEL: CPT

## 2022-06-07 PROCEDURE — 36415 COLL VENOUS BLD VENIPUNCTURE: CPT

## 2022-06-07 PROCEDURE — 80061 LIPID PANEL: CPT

## 2022-08-25 ENCOUNTER — OFFICE VISIT (OUTPATIENT)
Dept: FAMILY MEDICINE CLINIC | Age: 87
End: 2022-08-25

## 2022-08-25 VITALS
SYSTOLIC BLOOD PRESSURE: 114 MMHG | HEIGHT: 69 IN | HEART RATE: 64 BPM | WEIGHT: 180 LBS | OXYGEN SATURATION: 96 % | DIASTOLIC BLOOD PRESSURE: 57 MMHG | BODY MASS INDEX: 26.66 KG/M2

## 2022-08-25 DIAGNOSIS — Z85.46 HISTORY OF PROSTATE CANCER: Primary | ICD-10-CM

## 2022-08-25 DIAGNOSIS — I65.23 ASYMPTOMATIC BILATERAL CAROTID ARTERY STENOSIS: ICD-10-CM

## 2022-08-25 DIAGNOSIS — N40.0 BENIGN PROSTATIC HYPERPLASIA WITHOUT LOWER URINARY TRACT SYMPTOMS: ICD-10-CM

## 2022-08-25 PROBLEM — I73.9 PERIPHERAL VASCULAR DISEASE WITH CLAUDICATION: Status: ACTIVE | Noted: 2022-03-30

## 2022-08-25 PROBLEM — I73.9 PERIPHERAL VASCULAR DISEASE WITH CLAUDICATION (HCC): Status: RESOLVED | Noted: 2022-03-30 | Resolved: 2022-08-25

## 2022-08-25 PROBLEM — I65.29 ASYMPTOMATIC CAROTID ARTERY STENOSIS: Status: ACTIVE | Noted: 2022-03-30

## 2022-08-25 PROBLEM — R51.9 ACUTE NONINTRACTABLE HEADACHE: Status: RESOLVED | Noted: 2022-03-11 | Resolved: 2022-08-25

## 2022-08-25 PROCEDURE — 99213 OFFICE O/P EST LOW 20 MIN: CPT | Performed by: NURSE PRACTITIONER

## 2022-08-25 RX ORDER — TERAZOSIN 2 MG/1
2 CAPSULE ORAL NIGHTLY
Qty: 90 CAPSULE | Refills: 1 | Status: SHIPPED | OUTPATIENT
Start: 2022-08-25 | End: 2022-08-29 | Stop reason: SDUPTHER

## 2022-08-25 RX ORDER — ATORVASTATIN CALCIUM 10 MG/1
10 TABLET, FILM COATED ORAL DAILY
Qty: 90 TABLET | Refills: 1 | Status: SHIPPED | OUTPATIENT
Start: 2022-08-25

## 2022-08-25 NOTE — ASSESSMENT & PLAN NOTE
There is preventive benefit and continuing statin therapy.  He is tolerating current treatment without side effects.  I will be happy to continue refills of this medication.

## 2022-08-25 NOTE — ASSESSMENT & PLAN NOTE
Further treatment pending PSA results and I agree seeing a urologist at least on an annual basis or more often would be recommended.  Refills of terazosin are provided today.

## 2022-08-25 NOTE — PROGRESS NOTES
"Chief Complaint  referral to urology  (Used to see Dr. Garsia at [facility] ) and Discuss medication (Patient is wanting to discuss atorvastatin )    Subjective  Patient is an 87-year-old male who is here today with his wife.  Treated for prostate cancer with radiation little more than 2 years ago.  His radiation oncologist,  Dr. Hairston,  they believe has retired and they need someone to continue prescribing terazosin 2 mg at bedtime.  His last PSA level was done June 2021.  He reports great benefit from taking terazosin and denies any difficulty with starting or stopping his urinary stream when taken.  He denies side effects and requests refills.  Does not mind seeing a urologist at least on an annual basis.    Regarding asymptomatic carotid artery stenosis, he was started on atorvastatin 10 mg daily.  He denies medication side effects.  Would like to start getting refills from this office.  Vascular specialist started this prescription and he last had a lipid panel drawn in June of this year.  Lipid panel normal.    He and wife state that he feels good and denies any concerns today.        Magan Lubin presents to [facility]          Objective   Vital Signs:   Vitals:    08/25/22 1426   BP: 114/57   BP Location: Right arm   Patient Position: Sitting   Cuff Size: Adult   Pulse: 64   SpO2: 96%   Weight: 81.6 kg (180 lb)   Height: 175.3 cm (69\")      Body mass index is 26.58 kg/m².  Physical Exam  Vitals reviewed.   Constitutional:       General: He is not in acute distress.     Appearance: Normal appearance. He is well-developed.   Cardiovascular:      Rate and Rhythm: Normal rate and regular rhythm.      Heart sounds: Normal heart sounds.   Pulmonary:      Effort: Pulmonary effort is normal.      Breath sounds: Normal breath sounds.   Musculoskeletal:      Right lower leg: No edema.      Left lower leg: No edema.   Skin:     General: Skin is warm and dry.   Neurological:      General: " No focal deficit present.      Mental Status: He is alert.   Psychiatric:         Attention and Perception: Attention normal.         Mood and Affect: Mood and affect normal.         Behavior: Behavior normal.          Result Review :     CMP    CMP 6/7/22   Glucose 95   BUN 21   Creatinine 0.90   Sodium 138   Potassium 4.7   Chloride 105   Calcium 9.7   Albumin 4.10   Total Bilirubin 0.5   Alkaline Phosphatase 88   AST (SGOT) 22   ALT (SGPT) 20                 Lipid Panel    Lipid Panel 6/7/22   Total Cholesterol 123   Triglycerides 50   HDL Cholesterol 40   VLDL Cholesterol 12   LDL Cholesterol  71   LDL/HDL Ratio 1.83                        Assessment and Plan    Diagnoses and all orders for this visit:    1. History of prostate cancer (Primary)  Assessment & Plan:  Further treatment pending PSA results and I agree seeing a urologist at least on an annual basis or more often would be recommended.  Refills of terazosin are provided today.    Orders:  -     PSA DIAGNOSTIC ONLY; Future  -     Ambulatory Referral to Urology    2. Benign prostatic hyperplasia without lower urinary tract symptoms  -     PSA DIAGNOSTIC ONLY; Future  -     terazosin (HYTRIN) 2 MG capsule; Take 1 capsule by mouth Every Night.  Dispense: 90 capsule; Refill: 1  -     Ambulatory Referral to Urology    3. Asymptomatic bilateral carotid artery stenosis  Assessment & Plan:  There is preventive benefit and continuing statin therapy.  He is tolerating current treatment without side effects.  I will be happy to continue refills of this medication.    Orders:  -     atorvastatin (LIPITOR) 10 MG tablet; Take 1 tablet by mouth Daily.  Dispense: 90 tablet; Refill: 1      Follow Up    No follow-ups on file.  Patient was given instructions and counseling regarding his condition or for health maintenance advice. Please see specific information pulled into the AVS if appropriate.

## 2022-08-29 DIAGNOSIS — N40.0 BENIGN PROSTATIC HYPERPLASIA WITHOUT LOWER URINARY TRACT SYMPTOMS: ICD-10-CM

## 2022-08-29 RX ORDER — TERAZOSIN 2 MG/1
2 CAPSULE ORAL NIGHTLY
Qty: 90 CAPSULE | Refills: 1 | Status: SHIPPED | OUTPATIENT
Start: 2022-08-29

## 2022-09-21 ENCOUNTER — TELEPHONE (OUTPATIENT)
Dept: FAMILY MEDICINE CLINIC | Age: 87
End: 2022-09-21

## 2022-11-01 ENCOUNTER — LAB (OUTPATIENT)
Dept: LAB | Facility: HOSPITAL | Age: 87
End: 2022-11-01

## 2022-11-01 DIAGNOSIS — Z85.46 HISTORY OF PROSTATE CANCER: ICD-10-CM

## 2022-11-01 DIAGNOSIS — N40.0 BENIGN PROSTATIC HYPERPLASIA WITHOUT LOWER URINARY TRACT SYMPTOMS: ICD-10-CM

## 2022-11-01 LAB — PSA SERPL-MCNC: 1.04 NG/ML (ref 0–4)

## 2022-11-01 PROCEDURE — 36415 COLL VENOUS BLD VENIPUNCTURE: CPT

## 2022-11-01 PROCEDURE — 84153 ASSAY OF PSA TOTAL: CPT

## 2022-11-11 ENCOUNTER — TELEPHONE (OUTPATIENT)
Dept: FAMILY MEDICINE CLINIC | Age: 87
End: 2022-11-11

## 2022-11-11 NOTE — TELEPHONE ENCOUNTER
Caller: TE PAREDES    Relationship: Emergency Contact    Best call back number: [phone]    What is the best time to reach you: ANYTIME IN THE AFTERNOON    Who are you requesting to speak with (clinical staff, provider,  specific staff member): CLINICAL    What was the call regarding: PATIENT'S SPOUSE CALLED AND STATED THAT PATIENT WOULD LIKE A CALLBACK FOR HIS LAB RESULTS.    Do you require a callback: YES

## 2022-12-03 PROBLEM — N40.1 BENIGN PROSTATIC HYPERPLASIA WITH LOWER URINARY TRACT SYMPTOMS: Status: ACTIVE | Noted: 2022-12-03

## 2022-12-03 NOTE — PROGRESS NOTES
Chief Complaint: Urologic complaint    Subjective         History of Present Illness  Magan Lubin is a 87 y.o. male     H/o Prostate cancer  Urge incontinence  Nocturia      Bothered by nocturia 2-3 times nightly, daytime frequency not bothersome.  Some minor urge incontinence..  No pads.  On terazosin 2 mg daily  Nocturia is very bothersome        No GH     no history of urologic surgery.    PVR    12/22    63    No cardiopulmonary history.  Non-smoker.  Asa 81    2 caffeinated beverage in the morning.    6/22   0.9, GFR 84      UA negative today.    Prostate  CA    11/22      1.0  6/21        1.3  6/20         1.2  [place] 0.3  2007 diagnosis prostate cancer by Dr. Harman,XRT with Dr. Hairston      Results for orders placed or performed in visit on 12/05/22   Bladder Scan   Result Value Ref Range    Volume 63ML        Bladder Scan interpretation 12/05/2022    Estimation of residual urine via BVI 3000 Verathon Bladder Scan  MA/nurse performing: Nolberto Phelan RN  Residual Urine: 063 ml  Indication: Benign prostatic hyperplasia with lower urinary tract symptoms, symptom details unspecified    Prostate cancer (HCC)   Position: Supine  Examination: Incremental scanning of the suprapubic area using 2.0 MHz transducer using copious amounts of acoustic gel.   Findings: An anechoic area was demonstrated which represented the bladder, with measurement of residual urine as noted. I inspected this myself. In that the residual urine was stable or insignificant, refer to plan for treatment and plan necessary at this time.         Objective     No past medical history on file.    No past surgical history on file.      Current Outpatient Medications:   •  aspirin 81 MG EC tablet, Take 81 mg by mouth Daily., Disp: , Rfl:   •  atorvastatin (LIPITOR) 10 MG tablet, Take 1 tablet by mouth Daily., Disp: 90 tablet, Rfl: 1  •  terazosin (HYTRIN) 2 MG capsule, Take 1 capsule by mouth Every Night., Disp: 90 capsule, Rfl:  1    Allergies   Allergen Reactions   • Penicillins Unknown - Low Severity        No family history on file.    Social History     Socioeconomic History   • Marital status:    Tobacco Use   • Smoking status: Never   • Smokeless tobacco: Never   Vaping Use   • Vaping Use: Never used   Substance and Sexual Activity   • Alcohol use: Yes     Comment: occasional   • Drug use: Defer   • Sexual activity: Defer       Vital Signs:   There were no vitals taken for this visit.     Physical exam    Alert and orient x3  Well appearing, well developed, in no acute distress   Unlabored respirations  Nontender/nondistended      Grossly oriented to person, place and time, judgment is intact, normal mood and affect    Results for orders placed or performed in visit on 11/01/22   PSA DIAGNOSTIC ONLY    Specimen: Blood   Result Value Ref Range    PSA 1.040 0.000 - 4.000 ng/mL             Assessment and Plan    Diagnoses and all orders for this visit:    1. Benign prostatic hyperplasia with lower urinary tract symptoms, symptom details unspecified (Primary)         Nocturia/frequency      Patient is very bothered - Myrbetriq 25 mg daily.  Risk and benefits discussed.     Counseled to decrease fluids before bed.        Prostatic adenocarcinoma    Records reviewed and summarized in the chart    PSA stable, patient given reassurance.      Fu with NP with PSA 11/23

## 2022-12-05 ENCOUNTER — OFFICE VISIT (OUTPATIENT)
Dept: UROLOGY | Facility: CLINIC | Age: 87
End: 2022-12-05

## 2022-12-05 VITALS — RESPIRATION RATE: 19 BRPM | WEIGHT: 188.6 LBS | BODY MASS INDEX: 27.85 KG/M2

## 2022-12-05 DIAGNOSIS — C61 PROSTATE CANCER: ICD-10-CM

## 2022-12-05 DIAGNOSIS — N40.1 BENIGN PROSTATIC HYPERPLASIA WITH LOWER URINARY TRACT SYMPTOMS, SYMPTOM DETAILS UNSPECIFIED: Primary | ICD-10-CM

## 2022-12-05 LAB
BILIRUB BLD-MCNC: NEGATIVE MG/DL
CLARITY, POC: CLEAR
COLOR UR: YELLOW
EXPIRATION DATE: NORMAL
GLUCOSE UR STRIP-MCNC: NEGATIVE MG/DL
KETONES UR QL: NEGATIVE
LEUKOCYTE EST, POC: NEGATIVE
Lab: NORMAL
NITRITE UR-MCNC: NEGATIVE MG/ML
PH UR: 5.5 [PH] (ref 5–8)
PROT UR STRIP-MCNC: NEGATIVE MG/DL
RBC # UR STRIP: NEGATIVE /UL
SP GR UR: 1.03 (ref 1–1.03)
SPECIMEN VOL 24H UR: NORMAL L
UROBILINOGEN UR QL: NORMAL

## 2022-12-05 PROCEDURE — 51798 US URINE CAPACITY MEASURE: CPT | Performed by: UROLOGY

## 2022-12-05 PROCEDURE — 81003 URINALYSIS AUTO W/O SCOPE: CPT | Performed by: UROLOGY

## 2022-12-05 PROCEDURE — 99204 OFFICE O/P NEW MOD 45 MIN: CPT | Performed by: UROLOGY

## 2023-06-06 DIAGNOSIS — I65.23 ASYMPTOMATIC BILATERAL CAROTID ARTERY STENOSIS: ICD-10-CM

## 2023-06-06 RX ORDER — ATORVASTATIN CALCIUM 10 MG/1
TABLET, FILM COATED ORAL
Qty: 30 TABLET | Refills: 0 | Status: SHIPPED | OUTPATIENT
Start: 2023-06-06

## 2023-06-09 ENCOUNTER — LAB (OUTPATIENT)
Dept: LAB | Facility: HOSPITAL | Age: 88
End: 2023-06-09
Payer: MEDICARE

## 2023-06-09 ENCOUNTER — OFFICE VISIT (OUTPATIENT)
Dept: FAMILY MEDICINE CLINIC | Age: 88
End: 2023-06-09
Payer: MEDICARE

## 2023-06-09 ENCOUNTER — HOSPITAL ENCOUNTER (OUTPATIENT)
Dept: ULTRASOUND IMAGING | Facility: HOSPITAL | Age: 88
Discharge: HOME | End: 2023-06-09
Payer: MEDICARE

## 2023-06-09 VITALS
BODY MASS INDEX: 28.03 KG/M2 | TEMPERATURE: 98.1 F | DIASTOLIC BLOOD PRESSURE: 77 MMHG | SYSTOLIC BLOOD PRESSURE: 130 MMHG | WEIGHT: 178.6 LBS | HEIGHT: 67 IN | OXYGEN SATURATION: 93 % | HEART RATE: 68 BPM

## 2023-06-09 DIAGNOSIS — R60.0 EDEMA OF LEFT LOWER EXTREMITY: Primary | ICD-10-CM

## 2023-06-09 DIAGNOSIS — Z12.5 PROSTATE CANCER SCREENING: ICD-10-CM

## 2023-06-09 DIAGNOSIS — R60.0 EDEMA OF LEFT LOWER EXTREMITY: ICD-10-CM

## 2023-06-09 LAB
ALBUMIN SERPL-MCNC: 4.3 G/DL (ref 3.5–5.2)
ALBUMIN/GLOB SERPL: 1.5 G/DL
ALP SERPL-CCNC: 104 U/L (ref 39–117)
ALT SERPL W P-5'-P-CCNC: 18 U/L (ref 1–41)
ANION GAP SERPL CALCULATED.3IONS-SCNC: 9 MMOL/L (ref 5–15)
AST SERPL-CCNC: 23 U/L (ref 1–40)
BASOPHILS # BLD AUTO: 0.02 10*3/MM3 (ref 0–0.2)
BASOPHILS NFR BLD AUTO: 0.4 % (ref 0–1.5)
BILIRUB SERPL-MCNC: 0.4 MG/DL (ref 0–1.2)
BUN SERPL-MCNC: 18 MG/DL (ref 8–23)
BUN/CREAT SERPL: 22 (ref 7–25)
CALCIUM SPEC-SCNC: 9.3 MG/DL (ref 8.6–10.5)
CHLORIDE SERPL-SCNC: 105 MMOL/L (ref 98–107)
CO2 SERPL-SCNC: 25 MMOL/L (ref 22–29)
CREAT SERPL-MCNC: 0.82 MG/DL (ref 0.76–1.27)
DEPRECATED RDW RBC AUTO: 45.3 FL (ref 37–54)
EGFRCR SERPLBLD CKD-EPI 2021: 86.1 ML/MIN/1.73
EOSINOPHIL # BLD AUTO: 0.22 10*3/MM3 (ref 0–0.4)
EOSINOPHIL NFR BLD AUTO: 4.2 % (ref 0.3–6.2)
ERYTHROCYTE [DISTWIDTH] IN BLOOD BY AUTOMATED COUNT: 12 % (ref 12.3–15.4)
GLOBULIN UR ELPH-MCNC: 2.8 GM/DL
GLUCOSE SERPL-MCNC: 92 MG/DL (ref 65–99)
HCT VFR BLD AUTO: 38.7 % (ref 37.5–51)
HGB BLD-MCNC: 13.3 G/DL (ref 13–17.7)
IMM GRANULOCYTES # BLD AUTO: 0.02 10*3/MM3 (ref 0–0.05)
IMM GRANULOCYTES NFR BLD AUTO: 0.4 % (ref 0–0.5)
LYMPHOCYTES # BLD AUTO: 1.05 10*3/MM3 (ref 0.7–3.1)
LYMPHOCYTES NFR BLD AUTO: 20.2 % (ref 19.6–45.3)
MCH RBC QN AUTO: 34.4 PG (ref 26.6–33)
MCHC RBC AUTO-ENTMCNC: 34.4 G/DL (ref 31.5–35.7)
MCV RBC AUTO: 100 FL (ref 79–97)
MONOCYTES # BLD AUTO: 0.93 10*3/MM3 (ref 0.1–0.9)
MONOCYTES NFR BLD AUTO: 17.9 % (ref 5–12)
NEUTROPHILS NFR BLD AUTO: 2.97 10*3/MM3 (ref 1.7–7)
NEUTROPHILS NFR BLD AUTO: 56.9 % (ref 42.7–76)
PLATELET # BLD AUTO: 204 10*3/MM3 (ref 140–450)
PMV BLD AUTO: 8.4 FL (ref 6–12)
POTASSIUM SERPL-SCNC: 4.4 MMOL/L (ref 3.5–5.2)
PROT SERPL-MCNC: 7.1 G/DL (ref 6–8.5)
PSA SERPL-MCNC: 1.03 NG/ML (ref 0–4)
RBC # BLD AUTO: 3.87 10*6/MM3 (ref 4.14–5.8)
SODIUM SERPL-SCNC: 139 MMOL/L (ref 136–145)
WBC NRBC COR # BLD: 5.21 10*3/MM3 (ref 3.4–10.8)

## 2023-06-09 PROCEDURE — G0103 PSA SCREENING: HCPCS

## 2023-06-09 PROCEDURE — 85025 COMPLETE CBC W/AUTO DIFF WBC: CPT

## 2023-06-09 PROCEDURE — 1159F MED LIST DOCD IN RCRD: CPT | Performed by: NURSE PRACTITIONER

## 2023-06-09 PROCEDURE — 80053 COMPREHEN METABOLIC PANEL: CPT

## 2023-06-09 PROCEDURE — 36415 COLL VENOUS BLD VENIPUNCTURE: CPT

## 2023-06-09 PROCEDURE — 93971 EXTREMITY STUDY: CPT

## 2023-06-09 PROCEDURE — 1160F RVW MEDS BY RX/DR IN RCRD: CPT | Performed by: NURSE PRACTITIONER

## 2023-06-09 PROCEDURE — 99213 OFFICE O/P EST LOW 20 MIN: CPT | Performed by: NURSE PRACTITIONER

## 2023-06-09 NOTE — PROGRESS NOTES
"Chief Complaint  Leg Swelling (Left lower leg swelling x 1 day.  Not pain, no redness, not warm to touch)    Subjective          Magan Lubin presents to [facility] complaining of left lower extremity edema since yesterday.  Patient states that it did improve a little with elevation.  Right leg is not swollen at all.  There was no erythema or warmth to left calf.  Patient states his left calf was painful yesterday.  Patient does have history of prostate cancer treated with radiation.  He goes to urology routinely.  Last screening PSA was normal.  No urinary complaints, lower back pain or pain with bowel movements.      Objective   Vital Signs:   Vitals:    06/09/23 0931   BP: 130/77   BP Location: Right arm   Patient Position: Sitting   Cuff Size: Adult   Pulse: 68   Temp: 98.1 °F (36.7 °C)   TempSrc: Oral   SpO2: 93%   Weight: 81 kg (178 lb 9.6 oz)   Height: 170.2 cm (67\")       Physical Exam  Vitals reviewed.   Constitutional:       General: He is not in acute distress.     Appearance: Normal appearance. He is well-developed.   HENT:      Head: Normocephalic and atraumatic.   Eyes:      Conjunctiva/sclera: Conjunctivae normal.      Pupils: Pupils are equal, round, and reactive to light.   Cardiovascular:      Rate and Rhythm: Normal rate and regular rhythm.      Heart sounds: Normal heart sounds. No murmur heard.  Pulmonary:      Effort: Pulmonary effort is normal. No respiratory distress.      Breath sounds: Normal breath sounds.   Musculoskeletal:      Left lower leg: Edema present.      Comments: Nonpitting edema to left lower extremity.   Skin:     General: Skin is warm and dry.   Neurological:      Mental Status: He is alert and oriented to person, place, and time.   Psychiatric:         Mood and Affect: Mood and affect normal.         Behavior: Behavior normal.         Thought Content: Thought content normal.         Judgment: Judgment normal.        Result Review :        "       Assessment and Plan    Diagnoses and all orders for this visit:    1. Edema of left lower extremity (Primary)  Comments:  Doppler today to rule out DVT.  If negative concerned about return of prostate cancer with mets to lymph nodes since it is unilateral edema.  Orders:  -     CBC Auto Differential; Future  -     Comprehensive Metabolic Panel; Future  -     Cancel: D-dimer, Quantitative; Future  -     Cancel: US Venous Doppler Lower Extremity Left (duplex); Future  -     US Venous Doppler Lower Extremity Left (duplex); Future    2. Prostate cancer screening  -     PSA Screen; Future    Sending note to PCP.   Patient to notify office with any acute concerns or issues.  Patient verbalizes understanding, agrees with plan of care and has no further questions upon discharge.    Please note that portions of this note were completed with a voice recognition program.    Follow Up    Return if symptoms worsen or fail to improve.  Patient was given instructions and counseling regarding his condition or for health maintenance advice. Please see specific information pulled into the AVS if appropriate.

## 2023-06-12 NOTE — PROGRESS NOTES
Please let pt know that his doppler was negative for a clot and his PSA was normal. Keep scheduled f/u with Jo Ann, but come in sooner if swelling continues or worsens. (Has not been on my chart for over a year)larry Villanueva

## 2023-06-22 ENCOUNTER — TELEPHONE (OUTPATIENT)
Dept: FAMILY MEDICINE CLINIC | Age: 88
End: 2023-06-22

## 2023-06-22 NOTE — TELEPHONE ENCOUNTER
Caller: TE PAREDES    Relationship: Emergency Contact    Best call back number: [phone]    Do you know the name of the person who called: NO     What was the call regarding: PATIENT HAD A MISSED CALL REGARDING A MISSED APPOINTMENT THIS MORNING. THE PATIENT WAS NOT AWARE OF THE APPOINTMENT, NO REMINDERS WERE RECEIVED AND IT WAS NOT MENTIONED WHEN HE WAS SEEN BY ANOTHER PROVIDER ON 06/09/2023, 3 DAYS AFTER THE WELLNESS VISIT WAS SCHEDULED PATIENT WILL CALL AND SCHEDULE THE WELLNESS VISIT AT A LATER DATE        I

## 2023-07-21 DIAGNOSIS — I65.23 ASYMPTOMATIC BILATERAL CAROTID ARTERY STENOSIS: ICD-10-CM

## 2023-07-25 ENCOUNTER — APPOINTMENT (OUTPATIENT)
Dept: CT IMAGING | Facility: HOSPITAL | Age: 88
DRG: 035 | End: 2023-07-25
Payer: MEDICARE

## 2023-07-25 ENCOUNTER — APPOINTMENT (OUTPATIENT)
Dept: MRI IMAGING | Facility: HOSPITAL | Age: 88
DRG: 035 | End: 2023-07-25
Payer: MEDICARE

## 2023-07-25 ENCOUNTER — HOSPITAL ENCOUNTER (INPATIENT)
Facility: HOSPITAL | Age: 88
LOS: 6 days | Discharge: HOME HEALTH | DRG: 035 | End: 2023-08-01
Attending: EMERGENCY MEDICINE | Admitting: HOSPITALIST
Payer: MEDICARE

## 2023-07-25 ENCOUNTER — APPOINTMENT (OUTPATIENT)
Dept: GENERAL RADIOLOGY | Facility: HOSPITAL | Age: 88
DRG: 035 | End: 2023-07-25
Payer: MEDICARE
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
## 2023-07-25 DIAGNOSIS — R91.1 LUNG NODULE SEEN ON IMAGING STUDY: ICD-10-CM

## 2023-07-25 DIAGNOSIS — I65.21 SYMPTOMATIC STENOSIS OF RIGHT CAROTID ARTERY: ICD-10-CM

## 2023-07-25 DIAGNOSIS — S09.90XA INJURY OF HEAD, INITIAL ENCOUNTER: ICD-10-CM

## 2023-07-25 DIAGNOSIS — R20.2 PARESTHESIA OF LEFT UPPER EXTREMITY: Primary | ICD-10-CM

## 2023-07-25 DIAGNOSIS — I63.9 ISCHEMIC STROKE: ICD-10-CM

## 2023-07-25 PROBLEM — R20.0 LEFT UPPER EXTREMITY NUMBNESS: Status: ACTIVE | Noted: 2023-07-25

## 2023-07-25 LAB
ABO GROUP BLD: NORMAL
ALBUMIN SERPL-MCNC: 4.4 G/DL (ref 3.5–5.2)
ALBUMIN/GLOB SERPL: 1.9 G/DL
ALP SERPL-CCNC: 95 U/L (ref 39–117)
ALT SERPL W P-5'-P-CCNC: 23 U/L (ref 1–41)
ANION GAP SERPL CALCULATED.3IONS-SCNC: 10.2 MMOL/L (ref 5–15)
APTT PPP: 32.1 SECONDS (ref 22.7–35.4)
AST SERPL-CCNC: 20 U/L (ref 1–40)
BASOPHILS # BLD AUTO: 0.01 10*3/MM3 (ref 0–0.2)
BASOPHILS NFR BLD AUTO: 0.2 % (ref 0–1.5)
BILIRUB SERPL-MCNC: 0.6 MG/DL (ref 0–1.2)
BLD GP AB SCN SERPL QL: NEGATIVE
BUN SERPL-MCNC: 17 MG/DL (ref 8–23)
BUN/CREAT SERPL: 19.5 (ref 7–25)
CALCIUM SPEC-SCNC: 9.8 MG/DL (ref 8.6–10.5)
CHLORIDE SERPL-SCNC: 104 MMOL/L (ref 98–107)
CHOLEST SERPL-MCNC: 127 MG/DL (ref 0–200)
CO2 SERPL-SCNC: 24.8 MMOL/L (ref 22–29)
CREAT SERPL-MCNC: 0.87 MG/DL (ref 0.76–1.27)
DEPRECATED RDW RBC AUTO: 43.5 FL (ref 37–54)
EGFRCR SERPLBLD CKD-EPI 2021: 84 ML/MIN/1.73
EOSINOPHIL # BLD AUTO: 0.04 10*3/MM3 (ref 0–0.4)
EOSINOPHIL NFR BLD AUTO: 0.6 % (ref 0.3–6.2)
ERYTHROCYTE [DISTWIDTH] IN BLOOD BY AUTOMATED COUNT: 11.8 % (ref 12.3–15.4)
GLOBULIN UR ELPH-MCNC: 2.3 GM/DL
GLUCOSE BLDC GLUCOMTR-MCNC: 95 MG/DL (ref 70–130)
GLUCOSE SERPL-MCNC: 103 MG/DL (ref 65–99)
HBA1C MFR BLD: 5.6 % (ref 4.8–5.6)
HCT VFR BLD AUTO: 38.3 % (ref 37.5–51)
HDLC SERPL-MCNC: 49 MG/DL (ref 40–60)
HGB BLD-MCNC: 12.9 G/DL (ref 13–17.7)
HOLD SPECIMEN: NORMAL
HOLD SPECIMEN: NORMAL
IMM GRANULOCYTES # BLD AUTO: 0.02 10*3/MM3 (ref 0–0.05)
IMM GRANULOCYTES NFR BLD AUTO: 0.3 % (ref 0–0.5)
INR PPP: 1.06 (ref 0.9–1.1)
LDLC SERPL CALC-MCNC: 67 MG/DL (ref 0–100)
LDLC/HDLC SERPL: 1.4 {RATIO}
LYMPHOCYTES # BLD AUTO: 0.88 10*3/MM3 (ref 0.7–3.1)
LYMPHOCYTES NFR BLD AUTO: 13.2 % (ref 19.6–45.3)
MCH RBC QN AUTO: 33.8 PG (ref 26.6–33)
MCHC RBC AUTO-ENTMCNC: 33.7 G/DL (ref 31.5–35.7)
MCV RBC AUTO: 100.3 FL (ref 79–97)
MONOCYTES # BLD AUTO: 0.57 10*3/MM3 (ref 0.1–0.9)
MONOCYTES NFR BLD AUTO: 8.6 % (ref 5–12)
NEUTROPHILS NFR BLD AUTO: 5.13 10*3/MM3 (ref 1.7–7)
NEUTROPHILS NFR BLD AUTO: 77.1 % (ref 42.7–76)
NRBC BLD AUTO-RTO: 0 /100 WBC (ref 0–0.2)
PLATELET # BLD AUTO: 218 10*3/MM3 (ref 140–450)
PMV BLD AUTO: 9.3 FL (ref 6–12)
POTASSIUM SERPL-SCNC: 4 MMOL/L (ref 3.5–5.2)
PROT SERPL-MCNC: 6.7 G/DL (ref 6–8.5)
PROTHROMBIN TIME: 13.9 SECONDS (ref 11.7–14.2)
RBC # BLD AUTO: 3.82 10*6/MM3 (ref 4.14–5.8)
RH BLD: POSITIVE
SODIUM SERPL-SCNC: 139 MMOL/L (ref 136–145)
T&S EXPIRATION DATE: NORMAL
TRIGL SERPL-MCNC: 47 MG/DL (ref 0–150)
VLDLC SERPL-MCNC: 11 MG/DL (ref 5–40)
WBC NRBC COR # BLD: 6.65 10*3/MM3 (ref 3.4–10.8)
WHOLE BLOOD HOLD COAG: NORMAL
WHOLE BLOOD HOLD SPECIMEN: NORMAL

## 2023-07-25 PROCEDURE — 70498 CT ANGIOGRAPHY NECK: CPT

## 2023-07-25 PROCEDURE — 82607 VITAMIN B-12: CPT | Performed by: NURSE PRACTITIONER

## 2023-07-25 PROCEDURE — 93010 ELECTROCARDIOGRAM REPORT: CPT | Performed by: INTERNAL MEDICINE

## 2023-07-25 PROCEDURE — 99285 EMERGENCY DEPT VISIT HI MDM: CPT

## 2023-07-25 PROCEDURE — 80053 COMPREHEN METABOLIC PANEL: CPT | Performed by: EMERGENCY MEDICINE

## 2023-07-25 PROCEDURE — G0378 HOSPITAL OBSERVATION PER HR: HCPCS

## 2023-07-25 PROCEDURE — 83036 HEMOGLOBIN GLYCOSYLATED A1C: CPT | Performed by: NURSE PRACTITIONER

## 2023-07-25 PROCEDURE — 86900 BLOOD TYPING SEROLOGIC ABO: CPT | Performed by: EMERGENCY MEDICINE

## 2023-07-25 PROCEDURE — 85025 COMPLETE CBC W/AUTO DIFF WBC: CPT | Performed by: EMERGENCY MEDICINE

## 2023-07-25 PROCEDURE — 70496 CT ANGIOGRAPHY HEAD: CPT

## 2023-07-25 PROCEDURE — 71045 X-RAY EXAM CHEST 1 VIEW: CPT

## 2023-07-25 PROCEDURE — 70450 CT HEAD/BRAIN W/O DYE: CPT

## 2023-07-25 PROCEDURE — 85730 THROMBOPLASTIN TIME PARTIAL: CPT | Performed by: NURSE PRACTITIONER

## 2023-07-25 PROCEDURE — 86850 RBC ANTIBODY SCREEN: CPT | Performed by: EMERGENCY MEDICINE

## 2023-07-25 PROCEDURE — 72141 MRI NECK SPINE W/O DYE: CPT

## 2023-07-25 PROCEDURE — 85610 PROTHROMBIN TIME: CPT | Performed by: EMERGENCY MEDICINE

## 2023-07-25 PROCEDURE — 82948 REAGENT STRIP/BLOOD GLUCOSE: CPT

## 2023-07-25 PROCEDURE — 93005 ELECTROCARDIOGRAM TRACING: CPT | Performed by: EMERGENCY MEDICINE

## 2023-07-25 PROCEDURE — 70551 MRI BRAIN STEM W/O DYE: CPT

## 2023-07-25 PROCEDURE — 82746 ASSAY OF FOLIC ACID SERUM: CPT | Performed by: NURSE PRACTITIONER

## 2023-07-25 PROCEDURE — 25510000001 IOPAMIDOL PER 1 ML: Performed by: EMERGENCY MEDICINE

## 2023-07-25 PROCEDURE — 72125 CT NECK SPINE W/O DYE: CPT

## 2023-07-25 PROCEDURE — 84443 ASSAY THYROID STIM HORMONE: CPT | Performed by: NURSE PRACTITIONER

## 2023-07-25 PROCEDURE — 86901 BLOOD TYPING SEROLOGIC RH(D): CPT | Performed by: EMERGENCY MEDICINE

## 2023-07-25 PROCEDURE — 80061 LIPID PANEL: CPT | Performed by: NURSE PRACTITIONER

## 2023-07-25 RX ORDER — ATORVASTATIN CALCIUM 20 MG/1
40 TABLET, FILM COATED ORAL NIGHTLY
Status: DISCONTINUED | OUTPATIENT
Start: 2023-07-25 | End: 2023-07-26

## 2023-07-25 RX ORDER — TERAZOSIN 2 MG/1
2 CAPSULE ORAL NIGHTLY
Status: DISCONTINUED | OUTPATIENT
Start: 2023-07-25 | End: 2023-08-01 | Stop reason: HOSPADM

## 2023-07-25 RX ORDER — ONDANSETRON 2 MG/ML
4 INJECTION INTRAMUSCULAR; INTRAVENOUS EVERY 6 HOURS PRN
Status: DISCONTINUED | OUTPATIENT
Start: 2023-07-25 | End: 2023-08-01 | Stop reason: HOSPADM

## 2023-07-25 RX ORDER — SODIUM CHLORIDE 0.9 % (FLUSH) 0.9 %
10 SYRINGE (ML) INJECTION AS NEEDED
Status: DISCONTINUED | OUTPATIENT
Start: 2023-07-25 | End: 2023-08-01 | Stop reason: HOSPADM

## 2023-07-25 RX ORDER — SODIUM CHLORIDE 0.9 % (FLUSH) 0.9 %
10 SYRINGE (ML) INJECTION EVERY 12 HOURS SCHEDULED
Status: DISCONTINUED | OUTPATIENT
Start: 2023-07-25 | End: 2023-08-01 | Stop reason: HOSPADM

## 2023-07-25 RX ORDER — ASPIRIN 300 MG/1
300 SUPPOSITORY RECTAL DAILY
Status: DISCONTINUED | OUTPATIENT
Start: 2023-07-25 | End: 2023-07-31

## 2023-07-25 RX ORDER — MIRTAZAPINE 7.5 MG/1
7.5 TABLET, FILM COATED ORAL NIGHTLY
COMMUNITY
End: 2023-08-01 | Stop reason: HOSPADM

## 2023-07-25 RX ORDER — SODIUM CHLORIDE 9 MG/ML
40 INJECTION, SOLUTION INTRAVENOUS AS NEEDED
Status: DISCONTINUED | OUTPATIENT
Start: 2023-07-25 | End: 2023-08-01 | Stop reason: HOSPADM

## 2023-07-25 RX ORDER — ASPIRIN 325 MG
325 TABLET ORAL ONCE
Status: DISCONTINUED | OUTPATIENT
Start: 2023-07-25 | End: 2023-07-25

## 2023-07-25 RX ORDER — ASPIRIN 325 MG
325 TABLET ORAL DAILY
Status: DISCONTINUED | OUTPATIENT
Start: 2023-07-25 | End: 2023-08-01 | Stop reason: HOSPADM

## 2023-07-25 RX ORDER — MIRTAZAPINE 15 MG/1
7.5 TABLET, FILM COATED ORAL NIGHTLY
Status: DISCONTINUED | OUTPATIENT
Start: 2023-07-25 | End: 2023-07-31

## 2023-07-25 RX ADMIN — ATORVASTATIN CALCIUM 40 MG: 20 TABLET, FILM COATED ORAL at 21:03

## 2023-07-25 RX ADMIN — ASPIRIN 325 MG: 325 TABLET ORAL at 21:05

## 2023-07-25 RX ADMIN — IOPAMIDOL 95 ML: 755 INJECTION, SOLUTION INTRAVENOUS at 18:38

## 2023-07-25 RX ADMIN — MIRTAZAPINE 7.5 MG: 15 TABLET, FILM COATED ORAL at 23:37

## 2023-07-25 RX ADMIN — Medication 10 ML: at 21:03

## 2023-07-25 NOTE — LETTER
July 27, 2023     Patient: Magan Lubin   YOB: 1935   Date of Visit: 7/25/2023       To Whom It May Concern:    Magan Lubin is admitted to [facility] from 7/25/2023 to current.            Sincerely,
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
## 2023-07-25 NOTE — ED NOTES
Pt to ed from home via EMS    Pt c/o L arm weakness since 0800. Pt also had fall this AM. Pt hit head with fall. No loc. Pt is not on blood thinners. Pt denies pain.

## 2023-07-25 NOTE — PROGRESS NOTES
Clinical Pharmacy Services: Medication History    Magan Lubin is a 88 y.o. male presenting to [facility] for   Chief Complaint   Patient presents with    Extremity Weakness       He  has no past medical history on file.    Allergies as of 07/25/2023 - Reviewed 07/25/2023   Allergen Reaction Noted    Penicillins Unknown - Low Severity 01/28/2022       Medication information was obtained from: Spouse   Pharmacy and Phone Number:     Prior to Admission Medications       Prescriptions Last Dose Informant Patient Reported? Taking?    aspirin 81 MG EC tablet 7/25/2023 Spouse/Significant Other Yes Yes    Take 1 tablet by mouth Daily.    atorvastatin (LIPITOR) 10 MG tablet 7/24/2023 Spouse/Significant Other No Yes    TAKE 1 TABLET BY MOUTH AT BEDTIME    Patient taking differently:  Take 1 tablet by mouth Every Night.    terazosin (HYTRIN) 2 MG capsule 7/24/2023 Spouse/Significant Other No Yes    Take 1 capsule by mouth Every Night.              Medication notes:     This medication list is complete to the best of my knowledge as of 7/25/2023    Please call if questions.    Demetrice Mccallum  Medication History Technician   [phone]    7/25/2023 17:14 EDT

## 2023-07-25 NOTE — PROGRESS NOTES
MD ATTESTATION NOTE    The MARY and I have discussed this patient's history, physical exam, and treatment plan.  I have reviewed the documentation and personally had a face to face interaction with the patient. I affirm the documentation and agree with the treatment and plan.  The attached note describes my personal findings.      I provided a substantive portion of the care of the patient.  I personally performed the physical exam in its entirety, and below are my findings.      Brief HPI:  History is obtained from the patient, spouse, and daughter in the room.  Patient has a history of some memory impairment that is gradually getting worse over the past several months.  He does not remember the exact specifics of why he is here.  He got up this morning and somehow he fell in the bathroom.  He is not certain why he fell or even if he did fell.  He then was able to get back and sit in a chair.  After quite a while he did inform his family that he says I think I am having a stroke.  He did report that he did have some weakness in his left arm and numbness in his left arm and states to me he also had some weakness in his left leg.  Currently right now he is denying any numbness or weakness.  Denies ever having any pain.  No headache, neck pain, chest pain, shortness of breath, abdominal pain.  He is unaware if he passed out or again what caused him to fall in the bathroom.  His spouse believes that he fell in the bathroom because the shower Cortan and the dowel malorie had been pulled off in the shower.  He does have a history of carotid artery stenosis, prostate cancer and BPH.  Also has history of peripheral vascular disease.    General : This is an 88-year-old male who appears frail and chronically ill.  He is pleasant.  Appears in no acute distress.  Patient is awake alert and oriented  HEENT: NCAT.  Patient has a small linear abrasion and bruise to his left parietal scalp.  No active bleeding and nontender.  Patient  does not have any pain in palpation to her neck.  Has full range of motion  CV: Heart is regular with no murmurs  Respiratory: CTA bilaterally.  No respiratory distress  Abd: Soft and nontender  Ext: No acute abnormalities  Skin: No rash  Neuro: Cranial nerves II through XII grossly intact as tested.   Patient has some chronic memory deficit.  He is aware of his name and the place.  He does forget things easily.  He has a mild left arm drift and I am giving him a 1 on the stroke scale.  His NIH stroke scale is 1.  No drift to his lower extremities his speech appears appropriate and at baseline per family at bedside  Psych: Normal mood and affect    I have reviewed the patient's vital signs, lab work, EKG and imaging.    Plan:  #1)  Left arm weakness and numbness: So far CT scan of the head is unremarkable.  Lab work is unremarkable.  I am concerned this gentleman has had a stroke infecting his left side.  He is not a thrombolytic candidate.  When he went to bed last night he seemed to be acting his normal self.  Uncertain exactly when this started we know that when he got up this morning is when he fell in the bathroom and anticipate sometime in the middle of the night is when this occurred.  Talked at length with the patient and family about the test that we will order.  We will get a check an MRI of his head as well as CT angiogram of his head and neck.  Neurology has been consulted.  All questions answered at this time        Note Disclaimer: At [facility], we believe that sharing information builds trust and better relationships. You are receiving this note because you recently visited [facility]. It is possible you will see health information before a provider has talked with you about it. This kind of information can be easy to misunderstand. To help you fully understand what it means for your health, we urge you to discuss this note with your provider.

## 2023-07-25 NOTE — H&P
. [facility]   HISTORY AND PHYSICAL    Patient Name: Magan Lubin  : 1935  MRN: [id number]  Primary Care Physician:  Jo Ann Friend APRN  Date of admission: 2023    Subjective   Subjective     Chief Complaint: Dizziness and left upper extremity with numbness    HPI:    Magan Lubin is a 88 y.o. male with past medical history including but not limited to carotid artery stenosis, PPA and history of prostate cancer resents to [facility] with dizziness and left upper extremities numbness.  Patient reports around 5 AM while ambulating to the bathroom, became dizzy and fell striking his head on the bathtub however patient denies losing consciousness or being anticoagulated. Then, patient developed left upper extremity numbness.  Denies associated headache, visual disturbance, dysarthria or aphasia.  Denies unilateral weakness or facial droop.  Denies head or neck pain.  Denies chest pain, palpitation, dyspnea.  Denies abdominal pain, nausea, vomiting, or diarrhea.  Denies cough, fever, chills, lower extremity edema.    Patient's daughter reports that a year ago he was having intermittent dizziness and since then had a carotid ultrasound that showed left ICA stenosis and was started on statin by vascular surgery.    Oratory evaluation in the ED include creatinine 0.87, glucose 103, WBC 6.6, hemoglobin 12.9 and platelets 218.  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    No past surgical history on file.    Family History: family history is not on file. Otherwise pertinent FHx was reviewed and not pertinent to current issue.    Social History:  reports  that he quit smoking about 10 years ago. His smoking use included cigarettes. He started smoking about 68 years ago. He has a 58.00 pack-year smoking history. He has never used smokeless tobacco. He reports current alcohol use. Drug use questions deferred to the physician.    Home Medications:  aspirin, atorvastatin, and terazosin    Allergies:  Allergies   Allergen Reactions    Penicillins Unknown - Low Severity       Objective   Objective     Vitals:   Temp:  [98 øF (36.7 øC)-98.1 øF (36.7 øC)] 98.1 øF (36.7 øC)  Heart Rate:  [55-67] 61  Resp:  [16-18] 16  BP: (118-142)/(69-75) 142/69  Physical Exam    Constitutional: Awake, alert   Eyes: PERRLA, sclerae anicteric, no conjunctival injection   HENT: NCAT, mucous membranes moist   Neck: Supple, no thyromegaly, no lymphadenopathy, trachea midline   Respiratory: Clear to auscultation bilaterally, nonlabored respirations    Cardiovascular: RRR, no murmurs, rubs, or gallops, palpable pedal pulses bilaterally   Gastrointestinal: Positive bowel sounds, soft, nontender, nondistended   Musculoskeletal: No bilateral ankle edema, no clubbing or cyanosis to extremities   Psychiatric: Appropriate affect, cooperative   Neurologic: Oriented x 3, strength symmetric in all extremities, Cranial Nerves grossly intact to confrontation, speech clear   Skin: No rashes     NIH Stroke Scale      Interval: Baseline  Time: 20:36 EDT  Person Administering Scale: TYRONE Thapa    Administer stroke scale items in the order listed. Record performance in each category after each subscale exam. Do not go back and change scores. Follow directions provided for each exam technique. Scores should reflect what the patient does, not what the clinician thinks the patient can do. The clinician should record answers while administering the exam and work quickly. Except where indicated, the patient should not be coached (i.e., repeated requests to patient to make a special effort).      1a  Level  of consciousness: 0=alert; keenly responsive   1b. LOC questions:  0=Performs both tasks correctly   1c. LOC commands: 0=Answers both questions correctly   2.  Best Gaze: 0=normal   3.  Visual: 0=No visual loss   4. Facial Palsy: 0=Normal symmetric movement   5a.  Motor left arm: 0=No drift, limb holds 90 (or 45) degrees for full 10 seconds   5b.  Motor right arm: 0=No drift, limb holds 90 (or 45) degrees for full 10 seconds   6a. motor left le=No drift, limb holds 90 (or 45) degrees for full 10 seconds   6b  Motor right le=No drift, limb holds 90 (or 45) degrees for full 10 seconds   7. Limb Ataxia: 0=Absent   8.  Sensory: 1=Mild to moderate sensory loss; patient feels pinprick is less sharp or is dull on the affected side; there is a loss of superficial pain with pinprick but patient is aware He is being touched   9. Best Language:  0=No aphasia, normal   10. Dysarthria: 0=Normal   11. Extinction and Inattention: 0=No abnormality   12. Distal motor function: 0=Normal    Total:   1     Result Review    Result Review:  I have personally reviewed the results from the time of this admission to 2023 19:53 EDT and agree with these findings:  []  Laboratory list / accordion  []  Microbiology  []  Radiology  []  EKG/Telemetry   []  Cardiology/Vascular   []  Pathology  []  Old records  []  Other:    Assessment & Plan   Assessment / Plan     Brief Patient Summary:  Magan Lubin is a 88 y.o. male who was seen and evaluated the ED for dizziness and left upper extremity paresthesia    Active Hospital Problems:  Active Hospital Problems    Diagnosis     **Left upper extremity numbness      Plan:   Acute CVA  -CT head without negative  -CTA head and neck shows 50% stenosis in the cavernous segment of the left intracranial ICA, 70% focal stenosis of the region of the right ICA and 60% focal stenosis of the region of the left ICA  -MRI brain without shows scattered foci of restricted diffusion within the right  cerebral hemisphere  -Aspirin and statin  -Neurology consult  -Vascular surgery consult  -[facility]    BPH  -Continue terazosin    DVT prophylaxis:  Mechanical DVT prophylaxis orders are present.    CODE STATUS:    Code Status (Patient has no pulse and is not breathing): CPR (Attempt to Resuscitate)  Medical Interventions (Patient has pulse or is breathing): Full Support  Release to patient: Routine Release    Admission Status:  I believe this patient meets observation status.    80 minutes has been spent by [facility] providers in the care of this patient while under observation status    .During patient visit, I utilized appropriate personal protective equipment including gloves. Appropriate PPE was worn during the entire visit.  Hand hygiene was completed before and after    Electronically signed by TYRONE Thapa, 07/25/23, 7:53 PM EDT.

## 2023-07-25 NOTE — LETTER
July 27, 2023      [address] [address]  [phone]          Patient: Magan Lubin   YOB: 1935   Date of Visit: 7/25/2023       To Whom It May Concern:    Magan Lubin is admitted to [facility] on 7/25/2023.    Please excuse     from work {AMB PED EXCUSE WHEN:20401}.        Sincerely,       No name on file

## 2023-07-26 ENCOUNTER — TELEPHONE (OUTPATIENT)
Dept: FAMILY MEDICINE CLINIC | Age: 88
End: 2023-07-26

## 2023-07-26 ENCOUNTER — APPOINTMENT (OUTPATIENT)
Dept: CARDIOLOGY | Facility: HOSPITAL | Age: 88
DRG: 035 | End: 2023-07-26
Payer: MEDICARE

## 2023-07-26 PROBLEM — I63.9 ISCHEMIC STROKE: Status: ACTIVE | Noted: 2023-07-26

## 2023-07-26 PROBLEM — I63.511 ACUTE ISCHEMIC RIGHT MCA STROKE: Status: ACTIVE | Noted: 2023-07-26

## 2023-07-26 PROBLEM — I65.29 SYMPTOMATIC CAROTID ARTERY STENOSIS: Status: ACTIVE | Noted: 2023-07-26

## 2023-07-26 LAB
ALBUMIN SERPL-MCNC: 3.8 G/DL (ref 3.5–5.2)
ALBUMIN/GLOB SERPL: 1.7 G/DL
ALP SERPL-CCNC: 87 U/L (ref 39–117)
ALT SERPL W P-5'-P-CCNC: 17 U/L (ref 1–41)
ANION GAP SERPL CALCULATED.3IONS-SCNC: 8 MMOL/L (ref 5–15)
AORTIC DIMENSIONLESS INDEX: 0.8 (DI)
AST SERPL-CCNC: 16 U/L (ref 1–40)
BH CV ECHO MEAS - AO MAX PG: 8 MMHG
BH CV ECHO MEAS - AO MEAN PG: 4 MMHG
BH CV ECHO MEAS - AO ROOT DIAM: 3.4 CM
BH CV ECHO MEAS - AO V2 MAX: 141 CM/SEC
BH CV ECHO MEAS - AO V2 VTI: 32.8 CM
BH CV ECHO MEAS - AVA(I,D): 2.47 CM2
BH CV ECHO MEAS - EDV(CUBED): 110.6 ML
BH CV ECHO MEAS - EDV(MOD-SP2): 112 ML
BH CV ECHO MEAS - EDV(MOD-SP4): 107 ML
BH CV ECHO MEAS - EF(MOD-BP): 66.1 %
BH CV ECHO MEAS - EF(MOD-SP2): 65.2 %
BH CV ECHO MEAS - EF(MOD-SP4): 66.4 %
BH CV ECHO MEAS - ESV(CUBED): 33.4 ML
BH CV ECHO MEAS - ESV(MOD-SP2): 39 ML
BH CV ECHO MEAS - ESV(MOD-SP4): 36 ML
BH CV ECHO MEAS - FS: 32.9 %
BH CV ECHO MEAS - IVS/LVPW: 1 CM
BH CV ECHO MEAS - IVSD: 0.8 CM
BH CV ECHO MEAS - LAT PEAK E' VEL: 7.5 CM/SEC
BH CV ECHO MEAS - LV DIASTOLIC VOL/BSA (35-75): 52.5 CM2
BH CV ECHO MEAS - LV MASS(C)D: 126.7 GRAMS
BH CV ECHO MEAS - LV MAX PG: 4.8 MMHG
BH CV ECHO MEAS - LV MEAN PG: 2 MMHG
BH CV ECHO MEAS - LV SYSTOLIC VOL/BSA (12-30): 17.7 CM2
BH CV ECHO MEAS - LV V1 MAX: 110 CM/SEC
BH CV ECHO MEAS - LV V1 VTI: 25.4 CM
BH CV ECHO MEAS - LVIDD: 4.8 CM
BH CV ECHO MEAS - LVIDS: 3.2 CM
BH CV ECHO MEAS - LVOT AREA: 3.2 CM2
BH CV ECHO MEAS - LVOT DIAM: 2.01 CM
BH CV ECHO MEAS - LVPWD: 0.8 CM
BH CV ECHO MEAS - MED PEAK E' VEL: 7.2 CM/SEC
BH CV ECHO MEAS - MR MAX PG: 38.6 MMHG
BH CV ECHO MEAS - MR MAX VEL: 310.7 CM/SEC
BH CV ECHO MEAS - MV A DUR: 0.11 SEC
BH CV ECHO MEAS - MV A MAX VEL: 77.1 CM/SEC
BH CV ECHO MEAS - MV DEC SLOPE: 268.5 CM/SEC2
BH CV ECHO MEAS - MV DEC TIME: 0.21 MSEC
BH CV ECHO MEAS - MV E MAX VEL: 94.7 CM/SEC
BH CV ECHO MEAS - MV E/A: 1.23
BH CV ECHO MEAS - MV MAX PG: 3.5 MMHG
BH CV ECHO MEAS - MV MEAN PG: 1.43 MMHG
BH CV ECHO MEAS - MV P1/2T: 103.6 MSEC
BH CV ECHO MEAS - MV V2 VTI: 36.8 CM
BH CV ECHO MEAS - MVA(P1/2T): 2.12 CM2
BH CV ECHO MEAS - MVA(VTI): 2.2 CM2
BH CV ECHO MEAS - PA ACC TIME: 0.13 SEC
BH CV ECHO MEAS - PA V2 MAX: 93.4 CM/SEC
BH CV ECHO MEAS - PULM A REVS DUR: 0.1 SEC
BH CV ECHO MEAS - PULM A REVS VEL: 32.1 CM/SEC
BH CV ECHO MEAS - PULM DIAS VEL: 52.3 CM/SEC
BH CV ECHO MEAS - PULM S/D: 0.75
BH CV ECHO MEAS - PULM SYS VEL: 39.4 CM/SEC
BH CV ECHO MEAS - RAP SYSTOLE: 3 MMHG
BH CV ECHO MEAS - RV MAX PG: 2.7 MMHG
BH CV ECHO MEAS - RV V1 MAX: 82.3 CM/SEC
BH CV ECHO MEAS - RV V1 VTI: 18.5 CM
BH CV ECHO MEAS - RVSP: 38 MMHG
BH CV ECHO MEAS - SI(MOD-SP2): 35.8 ML/M2
BH CV ECHO MEAS - SI(MOD-SP4): 34.9 ML/M2
BH CV ECHO MEAS - SV(LVOT): 81 ML
BH CV ECHO MEAS - SV(MOD-SP2): 73 ML
BH CV ECHO MEAS - SV(MOD-SP4): 71 ML
BH CV ECHO MEAS - TAPSE (>1.6): 2.48 CM
BH CV ECHO MEAS - TR MAX PG: 35 MMHG
BH CV ECHO MEAS - TR MAX VEL: 295.9 CM/SEC
BH CV ECHO MEASUREMENTS AVERAGE E/E' RATIO: 12.88
BH CV ECHO SHUNT ASSESSMENT PERFORMED (HIDDEN SCRIPTING): 1
BH CV XLRA - RV BASE: 3.7 CM
BH CV XLRA - TDI S': 11 CM/SEC
BILIRUB SERPL-MCNC: 0.4 MG/DL (ref 0–1.2)
BUN SERPL-MCNC: 17 MG/DL (ref 8–23)
BUN/CREAT SERPL: 19.8 (ref 7–25)
CALCIUM SPEC-SCNC: 9.2 MG/DL (ref 8.6–10.5)
CHLORIDE SERPL-SCNC: 106 MMOL/L (ref 98–107)
CO2 SERPL-SCNC: 25 MMOL/L (ref 22–29)
CREAT SERPL-MCNC: 0.86 MG/DL (ref 0.76–1.27)
DEPRECATED RDW RBC AUTO: 44 FL (ref 37–54)
EGFRCR SERPLBLD CKD-EPI 2021: 84.3 ML/MIN/1.73
ERYTHROCYTE [DISTWIDTH] IN BLOOD BY AUTOMATED COUNT: 12 % (ref 12.3–15.4)
GLOBULIN UR ELPH-MCNC: 2.2 GM/DL
GLUCOSE SERPL-MCNC: 98 MG/DL (ref 65–99)
HCT VFR BLD AUTO: 34.9 % (ref 37.5–51)
HGB BLD-MCNC: 11.7 G/DL (ref 13–17.7)
LEFT ATRIUM VOLUME INDEX: 21.1 ML/M2
MCH RBC QN AUTO: 33.4 PG (ref 26.6–33)
MCHC RBC AUTO-ENTMCNC: 33.5 G/DL (ref 31.5–35.7)
MCV RBC AUTO: 99.7 FL (ref 79–97)
PLATELET # BLD AUTO: 211 10*3/MM3 (ref 140–450)
PMV BLD AUTO: 9.6 FL (ref 6–12)
POTASSIUM SERPL-SCNC: 3.8 MMOL/L (ref 3.5–5.2)
PROT SERPL-MCNC: 6 G/DL (ref 6–8.5)
QT INTERVAL: 475 MS
RBC # BLD AUTO: 3.5 10*6/MM3 (ref 4.14–5.8)
SODIUM SERPL-SCNC: 139 MMOL/L (ref 136–145)
WBC NRBC COR # BLD: 6.45 10*3/MM3 (ref 3.4–10.8)

## 2023-07-26 PROCEDURE — 93306 TTE W/DOPPLER COMPLETE: CPT

## 2023-07-26 PROCEDURE — 99222 1ST HOSP IP/OBS MODERATE 55: CPT | Performed by: INTERNAL MEDICINE

## 2023-07-26 PROCEDURE — 80053 COMPREHEN METABOLIC PANEL: CPT | Performed by: NURSE PRACTITIONER

## 2023-07-26 PROCEDURE — 85027 COMPLETE CBC AUTOMATED: CPT | Performed by: NURSE PRACTITIONER

## 2023-07-26 PROCEDURE — 93306 TTE W/DOPPLER COMPLETE: CPT | Performed by: INTERNAL MEDICINE

## 2023-07-26 PROCEDURE — 99223 1ST HOSP IP/OBS HIGH 75: CPT | Performed by: STUDENT IN AN ORGANIZED HEALTH CARE EDUCATION/TRAINING PROGRAM

## 2023-07-26 RX ORDER — CLOPIDOGREL BISULFATE 75 MG/1
75 TABLET ORAL DAILY
Status: DISCONTINUED | OUTPATIENT
Start: 2023-07-27 | End: 2023-08-01 | Stop reason: HOSPADM

## 2023-07-26 RX ORDER — CLOPIDOGREL BISULFATE 75 MG/1
300 TABLET ORAL ONCE
Status: COMPLETED | OUTPATIENT
Start: 2023-07-26 | End: 2023-07-26

## 2023-07-26 RX ORDER — ATORVASTATIN CALCIUM 80 MG/1
80 TABLET, FILM COATED ORAL NIGHTLY
Status: DISCONTINUED | OUTPATIENT
Start: 2023-07-26 | End: 2023-08-01 | Stop reason: HOSPADM

## 2023-07-26 RX ADMIN — MIRTAZAPINE 7.5 MG: 15 TABLET, FILM COATED ORAL at 20:16

## 2023-07-26 RX ADMIN — ASPIRIN 325 MG: 325 TABLET ORAL at 08:50

## 2023-07-26 RX ADMIN — Medication 10 ML: at 20:18

## 2023-07-26 RX ADMIN — Medication 10 ML: at 08:44

## 2023-07-26 RX ADMIN — CLOPIDOGREL BISULFATE 300 MG: 75 TABLET, FILM COATED ORAL at 12:05

## 2023-07-26 RX ADMIN — TERAZOSIN 2 MG: 2 CAPSULE ORAL at 20:16

## 2023-07-26 RX ADMIN — ATORVASTATIN CALCIUM 80 MG: 80 TABLET, FILM COATED ORAL at 20:15

## 2023-07-26 NOTE — PLAN OF CARE
Goal Outcome Evaluation:pt came to ed for left upper arm numbness and weakness after a fall in which he hit his head. No open wounds on head. Small hematoma on forehead. Family states patient has been showing some signs of confusion x 1 month. Patient has been seen by vascular surgery , neurology, and cardiology. Cardiology has cleared patient for vascular surgery and patient is being admitted inpatient for probable impending surgery. Patient is showing right carotid artery stenosis and ipsi lateral right hemispheric multifocal stroke, Bed alarm is utilized.

## 2023-07-26 NOTE — PLAN OF CARE
Problem: Fall Injury Risk  Goal: Absence of Fall and Fall-Related Injury  Outcome: Ongoing, Progressing  Intervention: Identify and Manage Contributors  Recent Flowsheet Documentation  Taken 7/25/2023 2050 by Maris Cevallos RN  Medication Review/Management: medications reviewed  Intervention: Promote Injury-Free Environment  Recent Flowsheet Documentation  Taken 7/26/2023 0600 by Maris Cevallos RN  Safety Promotion/Fall Prevention:   assistive device/personal items within reach   clutter free environment maintained   fall prevention program maintained   lighting adjusted   nonskid shoes/slippers when out of bed   room organization consistent   safety round/check completed  Taken 7/26/2023 0400 by Maris Cevallos RN  Safety Promotion/Fall Prevention:   activity supervised   assistive device/personal items within reach   clutter free environment maintained   fall prevention program maintained   lighting adjusted   nonskid shoes/slippers when out of bed   room organization consistent   safety round/check completed  Taken 7/26/2023 0200 by Maris Cevallos RN  Safety Promotion/Fall Prevention:   assistive device/personal items within reach   clutter free environment maintained   fall prevention program maintained   nonskid shoes/slippers when out of bed   safety round/check completed   room organization consistent  Taken 7/26/2023 0000 by Maris Cevallos RN  Safety Promotion/Fall Prevention:   assistive device/personal items within reach   clutter free environment maintained   fall prevention program maintained   lighting adjusted   nonskid shoes/slippers when out of bed   room organization consistent   safety round/check completed  Taken 7/25/2023 2050 by Maris Cevallos RN  Safety Promotion/Fall Prevention:   assistive device/personal items within reach   clutter free environment maintained   fall prevention program maintained   lighting adjusted   nonskid shoes/slippers when out of bed   safety round/check completed    room organization consistent  Goal: Absence of Fall and Fall-Related Injury  Outcome: Ongoing, Progressing  Intervention: Identify and Manage Contributors  Recent Flowsheet Documentation  Taken 7/25/2023 2050 by Maris Cevallos RN  Medication Review/Management: medications reviewed  Intervention: Promote Injury-Free Environment  Recent Flowsheet Documentation  Taken 7/26/2023 0600 by Maris Cevallos RN  Safety Promotion/Fall Prevention:   assistive device/personal items within reach   clutter free environment maintained   fall prevention program maintained   lighting adjusted   nonskid shoes/slippers when out of bed   room organization consistent   safety round/check completed  Taken 7/26/2023 0400 by Maris Cevallos RN  Safety Promotion/Fall Prevention:   activity supervised   assistive device/personal items within reach   clutter free environment maintained   fall prevention program maintained   lighting adjusted   nonskid shoes/slippers when out of bed   room organization consistent   safety round/check completed  Taken 7/26/2023 0200 by Maris Cevallos RN  Safety Promotion/Fall Prevention:   assistive device/personal items within reach   clutter free environment maintained   fall prevention program maintained   nonskid shoes/slippers when out of bed   safety round/check completed   room organization consistent  Taken 7/26/2023 0000 by Maris Cevallos RN  Safety Promotion/Fall Prevention:   assistive device/personal items within reach   clutter free environment maintained   fall prevention program maintained   lighting adjusted   nonskid shoes/slippers when out of bed   room organization consistent   safety round/check completed  Taken 7/25/2023 2050 by Maris Cevallos RN  Safety Promotion/Fall Prevention:   assistive device/personal items within reach   clutter free environment maintained   fall prevention program maintained   lighting adjusted   nonskid shoes/slippers when out of bed   safety round/check completed    room organization consistent     Problem: Adult Inpatient Plan of Care  Goal: Plan of Care Review  Outcome: Ongoing, Progressing  Flowsheets (Taken 7/26/2023 0631)  Plan of Care Reviewed With: patient  Outcome Evaluation: Patient is an 10 year old male admitted to the observation unit for left upper extremity numbness. Stroke pathway is in place. NIH is 3. Patient passed swallow test and has a diet. Patient is alert and oriented but forgetful. Patient is assist x 1 to the bathroom. Patient had MRI done during the shift. Neurology is consulted.  Goal: Patient-Specific Goal (Individualized)  Outcome: Ongoing, Progressing  Goal: Absence of Hospital-Acquired Illness or Injury  Outcome: Ongoing, Progressing  Intervention: Identify and Manage Fall Risk  Recent Flowsheet Documentation  Taken 7/26/2023 0600 by Maris Cevallos, GUILHERME  Safety Promotion/Fall Prevention:   assistive device/personal items within reach   clutter free environment maintained   fall prevention program maintained   lighting adjusted   nonskid shoes/slippers when out of bed   room organization consistent   safety round/check completed  Taken 7/26/2023 0400 by Maris Cevallos, RN  Safety Promotion/Fall Prevention:   activity supervised   assistive device/personal items within reach   clutter free environment maintained   fall prevention program maintained   lighting adjusted   nonskid shoes/slippers when out of bed   room organization consistent   safety round/check completed  Taken 7/26/2023 0200 by Maris Cevallos, RN  Safety Promotion/Fall Prevention:   assistive device/personal items within reach   clutter free environment maintained   fall prevention program maintained   nonskid shoes/slippers when out of bed   safety round/check completed   room organization consistent  Taken 7/26/2023 0000 by Maris Cevallos, RN  Safety Promotion/Fall Prevention:   assistive device/personal items within reach   clutter free environment maintained   fall prevention program  maintained   lighting adjusted   nonskid shoes/slippers when out of bed   room organization consistent   safety round/check completed  Taken 7/25/2023 2050 by Maris Cevallos RN  Safety Promotion/Fall Prevention:   assistive device/personal items within reach   clutter free environment maintained   fall prevention program maintained   lighting adjusted   nonskid shoes/slippers when out of bed   safety round/check completed   room organization consistent  Intervention: Prevent Skin Injury  Recent Flowsheet Documentation  Taken 7/26/2023 0600 by Maris Cevallos RN  Body Position: position changed independently  Taken 7/26/2023 0400 by Maris Cevallos RN  Body Position: position changed independently  Taken 7/26/2023 0200 by Maris Cevallos RN  Body Position: position changed independently  Taken 7/26/2023 0000 by Maris Cevallos RN  Body Position: position changed independently  Taken 7/25/2023 2050 by Maris Cevallos RN  Body Position: position changed independently  Skin Protection: adhesive use limited  Intervention: Prevent and Manage VTE (Venous Thromboembolism) Risk  Recent Flowsheet Documentation  Taken 7/26/2023 0600 by Maris Cevallos RN  Activity Management: activity minimized  Taken 7/26/2023 0400 by Maris Cevallos RN  Activity Management: activity minimized  Taken 7/26/2023 0200 by Maris Cevallos RN  Activity Management: activity minimized  Taken 7/26/2023 0000 by Maris Cevallos RN  Activity Management: activity minimized  Taken 7/25/2023 2050 by Maris Cevallos RN  Activity Management: activity encouraged  VTE Prevention/Management: sequential compression devices off  Range of Motion: active ROM (range of motion) encouraged  Intervention: Prevent Infection  Recent Flowsheet Documentation  Taken 7/26/2023 0600 by Maris Cevallos RN  Infection Prevention:   hand hygiene promoted   rest/sleep promoted   single patient room provided  Taken 7/26/2023 0400 by Maris Cevallos RN  Infection Prevention:   hand hygiene  promoted   single patient room provided   rest/sleep promoted  Taken 7/26/2023 0200 by Maris Cevallos RN  Infection Prevention:   hand hygiene promoted   rest/sleep promoted   single patient room provided  Taken 7/26/2023 0000 by Maris Cevallos RN  Infection Prevention:   hand hygiene promoted   rest/sleep promoted   single patient room provided  Taken 7/25/2023 2050 by Maris Cevallos RN  Infection Prevention:   hand hygiene promoted   rest/sleep promoted   single patient room provided  Goal: Optimal Comfort and Wellbeing  Outcome: Ongoing, Progressing  Intervention: Provide Person-Centered Care  Recent Flowsheet Documentation  Taken 7/25/2023 2050 by Maris Cevallos RN  Trust Relationship/Rapport:   care explained   choices provided   questions answered   questions encouraged  Goal: Readiness for Transition of Care  Outcome: Ongoing, Progressing  Intervention: Mutually Develop Transition Plan  Recent Flowsheet Documentation  Taken 7/25/2023 2051 by Maris Cevallos RN  Transportation Anticipated: family or friend will provide  Patient/Family Anticipated Services at Transition: none  Patient/Family Anticipates Transition to: home with family  Taken 7/25/2023 2047 by Maris Cevallos RN  Equipment Currently Used at Home: none     Problem: Adjustment to Illness (Stroke, Ischemic/Transient Ischemic Attack)  Goal: Optimal Coping  Outcome: Ongoing, Progressing  Intervention: Support Psychosocial Response to Stroke  Recent Flowsheet Documentation  Taken 7/25/2023 2050 by Maris Cevallos RN  Supportive Measures: active listening utilized  Family/Support System Care:   presence promoted   self-care encouraged     Problem: Bowel Elimination Impaired (Stroke, Ischemic/Transient Ischemic Attack)  Goal: Effective Bowel Elimination  Outcome: Ongoing, Progressing     Problem: Cerebral Tissue Perfusion (Stroke, Ischemic/Transient Ischemic Attack)  Goal: Optimal Cerebral Tissue Perfusion  Outcome: Ongoing, Progressing  Intervention:  Protect and Optimize Cerebral Perfusion  Recent Flowsheet Documentation  Taken 7/25/2023 2050 by Maris Cevallos RN  Sensory Stimulation Regulation: television on  Cerebral Perfusion Promotion: blood pressure monitored     Problem: Cognitive Impairment (Stroke, Ischemic/Transient Ischemic Attack)  Goal: Optimal Cognitive Function  Outcome: Ongoing, Progressing  Intervention: Optimize Cognitive Function  Recent Flowsheet Documentation  Taken 7/25/2023 2050 by Maris Cevallos RN  Sensory Stimulation Regulation: television on  Reorientation Measures: reorientation provided     Problem: Communication Impairment (Stroke, Ischemic/Transient Ischemic Attack)  Goal: Improved Communication Skills  Outcome: Ongoing, Progressing  Intervention: Optimize Communication Skills  Recent Flowsheet Documentation  Taken 7/25/2023 2050 by Maris Cevallos RN  Communication Enhancement Strategies: call light answered in person     Problem: Functional Ability Impaired (Stroke, Ischemic/Transient Ischemic Attack)  Goal: Optimal Functional Ability  Outcome: Ongoing, Progressing  Intervention: Optimize Functional Ability  Recent Flowsheet Documentation  Taken 7/26/2023 0600 by Maris Cevallos RN  Activity Management: activity minimized  Taken 7/26/2023 0400 by Maris Cevallos RN  Activity Management: activity minimized  Taken 7/26/2023 0200 by Maris Cevallos RN  Activity Management: activity minimized  Taken 7/26/2023 0000 by Maris Cevallos RN  Activity Management: activity minimized  Taken 7/25/2023 2050 by Maris Cevallos RN  Activity Management: activity encouraged     Problem: Respiratory Compromise (Stroke, Ischemic/Transient Ischemic Attack)  Goal: Effective Oxygenation and Ventilation  Outcome: Ongoing, Progressing  Intervention: Optimize Oxygenation and Ventilation  Recent Flowsheet Documentation  Taken 7/26/2023 0600 by Maris Cevallos RN  Head of Bed (HOB) Positioning: HOB elevated  Taken 7/26/2023 0400 by Maris Cevallos RN  Head of Bed  (HOB) Positioning: HOB elevated  Taken 7/26/2023 0200 by Maris Cevallos RN  Head of Bed (HOB) Positioning: HOB elevated  Taken 7/26/2023 0000 by Maris Cevallos RN  Head of Bed (HOB) Positioning: HOB elevated  Taken 7/25/2023 2050 by Maris Cevallos RN  Head of Bed (HOB) Positioning: HOB elevated     Problem: Sensorimotor Impairment (Stroke, Ischemic/Transient Ischemic Attack)  Goal: Improved Sensorimotor Function  Outcome: Ongoing, Progressing  Intervention: Optimize Range of Motion, Motor Control and Function  Recent Flowsheet Documentation  Taken 7/26/2023 0600 by Maris Cevallos RN  Positioning/Transfer Devices:   pillows   in use  Taken 7/26/2023 0400 by Maris Cevallos RN  Positioning/Transfer Devices:   pillows   in use  Taken 7/26/2023 0200 by Maris Cevallos RN  Positioning/Transfer Devices:   pillows   in use  Taken 7/26/2023 0000 by Maris Cevallos RN  Positioning/Transfer Devices:   pillows   in use  Taken 7/25/2023 2050 by Maris Cveallos RN  Positioning/Transfer Devices:   pillows   in use  Range of Motion: active ROM (range of motion) encouraged  Intervention: Optimize Sensory and Perceptual Ability  Recent Flowsheet Documentation  Taken 7/25/2023 2050 by Maris Cevallos RN  Pressure Reduction Devices: alternating pressure pump (ADD)     Problem: Swallowing Impairment (Stroke, Ischemic/Transient Ischemic Attack)  Goal: Optimal Eating and Swallowing without Aspiration  Outcome: Ongoing, Progressing     Problem: Urinary Elimination Impaired (Stroke, Ischemic/Transient Ischemic Attack)  Goal: Effective Urinary Elimination  Outcome: Ongoing, Progressing   Goal Outcome Evaluation:  Plan of Care Reviewed With: patient           Outcome Evaluation: Patient is an 10 year old male admitted to the observation unit for left upper extremity numbness. Stroke pathway is in place. NIH is 3. Patient passed swallow test and has a diet. Patient is alert and oriented but forgetful. Patient is assist x 1 to the bathroom. Patient  had MRI done during the shift. Neurology is consulted.

## 2023-07-26 NOTE — PROGRESS NOTES
ABDIAS RODRIGUEZ ATTESTATION NOTE    The MARY and I have discussed this patient's history, physical exam, and treatment plan.  I have reviewed the documentation and personally had a face to face interaction with the patient. I affirm the documentation and agree with the treatment and plan.  The attached note describes my personal findings.      I provided a substantive portion of the care of this patient. I personally performed the physical exam, in its entirety.    Magan Lubin is a 88 y.o. male who presented to the emergency department yesterday complaining of sudden onset left upper extremity weakness.  In the emergency room CT of the head showed no acute process.  He was admitted to the observation unit for further evaluation.  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  MRI shows scattered areas of right cerebral hemisphere restricted diffusion.  Neurology has been consulted.  The patient reports that his symptoms have greatly improved this morning.      On exam:  GENERAL: Awake, alert, no acute distress  SKIN: Warm, dry  HENT: Normocephalic, atraumatic  EYES: no scleral icterus  CV: regular rhythm, regular rate  RESPIRATORY: normal effort, lungs clear  ABDOMEN: soft, nontender, nondistended  MUSCULOSKELETAL: no deformity  NEURO: alert, moves all extremities, follows commands.  Equal upper and lower extremity strength and sensation        Labs  Recent Results (from the past 24 hour(s))   Comprehensive Metabolic Panel    Collection Time: 07/25/23  3:03 PM    Specimen: Blood   Result Value Ref Range    Glucose 103 (H) 65 - 99 mg/dL    BUN 17 8 - 23 mg/dL    Creatinine 0.87 0.76 - 1.27 mg/dL    Sodium 139 136 - 145 mmol/L    Potassium 4.0 3.5 - 5.2 mmol/L    Chloride 104 98 - 107 mmol/L    CO2 24.8 22.0 - 29.0 mmol/L    Calcium 9.8 8.6 - 10.5 mg/dL    Total Protein 6.7 6.0 - 8.5 g/dL    Albumin 4.4 3.5 - 5.2 g/dL    ALT (SGPT) 23 1 - 41 U/L    AST (SGOT) 20 1 - 40 U/L     Alkaline Phosphatase 95 39 - 117 U/L    Total Bilirubin 0.6 0.0 - 1.2 mg/dL    Globulin 2.3 gm/dL    A/G Ratio 1.9 g/dL    BUN/Creatinine Ratio 19.5 7.0 - 25.0    Anion Gap 10.2 5.0 - 15.0 mmol/L    eGFR 84.0 >60.0 mL/min/1.73   Protime-INR    Collection Time: 07/25/23  3:03 PM    Specimen: Blood   Result Value Ref Range    Protime 13.9 11.7 - 14.2 Seconds    INR 1.06 0.90 - 1.10   Type & Screen    Collection Time: 07/25/23  3:03 PM    Specimen: Blood   Result Value Ref Range    ABO Type O     RH type Positive     Antibody Screen Negative     T&S Expiration Date 7/28/2023 11:59:59 PM    CBC Auto Differential    Collection Time: 07/25/23  3:03 PM    Specimen: Blood   Result Value Ref Range    WBC 6.65 3.40 - 10.80 10*3/mm3    RBC 3.82 (L) 4.14 - 5.80 10*6/mm3    Hemoglobin 12.9 (L) 13.0 - 17.7 g/dL    Hematocrit 38.3 37.5 - 51.0 %    .3 (H) 79.0 - 97.0 fL    MCH 33.8 (H) 26.6 - 33.0 pg    MCHC 33.7 31.5 - 35.7 g/dL    RDW 11.8 (L) 12.3 - 15.4 %    RDW-SD 43.5 37.0 - 54.0 fl    MPV 9.3 6.0 - 12.0 fL    Platelets 218 140 - 450 10*3/mm3    Neutrophil % 77.1 (H) 42.7 - 76.0 %    Lymphocyte % 13.2 (L) 19.6 - 45.3 %    Monocyte % 8.6 5.0 - 12.0 %    Eosinophil % 0.6 0.3 - 6.2 %    Basophil % 0.2 0.0 - 1.5 %    Immature Grans % 0.3 0.0 - 0.5 %    Neutrophils, Absolute 5.13 1.70 - 7.00 10*3/mm3    Lymphocytes, Absolute 0.88 0.70 - 3.10 10*3/mm3    Monocytes, Absolute 0.57 0.10 - 0.90 10*3/mm3    Eosinophils, Absolute 0.04 0.00 - 0.40 10*3/mm3    Basophils, Absolute 0.01 0.00 - 0.20 10*3/mm3    Immature Grans, Absolute 0.02 0.00 - 0.05 10*3/mm3    nRBC 0.0 0.0 - 0.2 /100 WBC   Green Top (Gel)    Collection Time: 07/25/23  3:03 PM   Result Value Ref Range    Extra Tube Hold for add-ons.    Lavender Top    Collection Time: 07/25/23  3:03 PM   Result Value Ref Range    Extra Tube hold for add-on    Gold Top - SST    Collection Time: 07/25/23  3:03 PM   Result Value Ref Range    Extra Tube Hold for add-ons.    Light Blue  Top    Collection Time: 07/25/23  3:03 PM   Result Value Ref Range    Extra Tube Hold for add-ons.    Hemoglobin A1c    Collection Time: 07/25/23  3:03 PM    Specimen: Blood   Result Value Ref Range    Hemoglobin A1C 5.60 4.80 - 5.60 %   Lipid Panel    Collection Time: 07/25/23  3:03 PM    Specimen: Blood   Result Value Ref Range    Total Cholesterol 127 0 - 200 mg/dL    Triglycerides 47 0 - 150 mg/dL    HDL Cholesterol 49 40 - 60 mg/dL    LDL Cholesterol  67 0 - 100 mg/dL    VLDL Cholesterol 11 5 - 40 mg/dL    LDL/HDL Ratio 1.40    aPTT    Collection Time: 07/25/23  3:03 PM    Specimen: Blood   Result Value Ref Range    PTT 32.1 22.7 - 35.4 seconds   ECG 12 Lead ED Triage Standing Order; Stroke (Onset >12 hrs)    Collection Time: 07/25/23  3:20 PM   Result Value Ref Range    QT Interval 475 ms   POC Glucose Once    Collection Time: 07/25/23  3:42 PM    Specimen: Blood   Result Value Ref Range    Glucose 95 70 - 130 mg/dL   CBC (No Diff)    Collection Time: 07/26/23  4:06 AM    Specimen: Arm, Right; Blood   Result Value Ref Range    WBC 6.45 3.40 - 10.80 10*3/mm3    RBC 3.50 (L) 4.14 - 5.80 10*6/mm3    Hemoglobin 11.7 (L) 13.0 - 17.7 g/dL    Hematocrit 34.9 (L) 37.5 - 51.0 %    MCV 99.7 (H) 79.0 - 97.0 fL    MCH 33.4 (H) 26.6 - 33.0 pg    MCHC 33.5 31.5 - 35.7 g/dL    RDW 12.0 (L) 12.3 - 15.4 %    RDW-SD 44.0 37.0 - 54.0 fl    MPV 9.6 6.0 - 12.0 fL    Platelets 211 140 - 450 10*3/mm3   Comprehensive Metabolic Panel    Collection Time: 07/26/23  4:06 AM    Specimen: Arm, Right; Blood   Result Value Ref Range    Glucose 98 65 - 99 mg/dL    BUN 17 8 - 23 mg/dL    Creatinine 0.86 0.76 - 1.27 mg/dL    Sodium 139 136 - 145 mmol/L    Potassium 3.8 3.5 - 5.2 mmol/L    Chloride 106 98 - 107 mmol/L    CO2 25.0 22.0 - 29.0 mmol/L    Calcium 9.2 8.6 - 10.5 mg/dL    Total Protein 6.0 6.0 - 8.5 g/dL    Albumin 3.8 3.5 - 5.2 g/dL    ALT (SGPT) 17 1 - 41 U/L    AST (SGOT) 16 1 - 40 U/L    Alkaline Phosphatase 87 39 - 117 U/L     Total Bilirubin 0.4 0.0 - 1.2 mg/dL    Globulin 2.2 gm/dL    A/G Ratio 1.7 g/dL    BUN/Creatinine Ratio 19.8 7.0 - 25.0    Anion Gap 8.0 5.0 - 15.0 mmol/L    eGFR 84.3 >60.0 mL/min/1.73   Adult Transthoracic Echo Complete W/ Cont if Necessary Per Protocol (With Agitated Saline)    Collection Time: 07/26/23  7:56 AM   Result Value Ref Range    EF(MOD-bp) 66.1 %    LVIDd 4.8 cm    LVIDs 3.2 cm    IVSd 0.80 cm    LVPWd 0.80 cm    FS 32.9 %    IVS/LVPW 1.00 cm    ESV(cubed) 33.4 ml    LV Sys Vol (BSA corrected) 17.7 cm2    EDV(cubed) 110.6 ml    LV Rose Vol (BSA corrected) 52.5 cm2    LVOT area 3.2 cm2    LV mass(C)d 126.7 grams    LVOT diam 2.01 cm    EDV(MOD-sp2) 112.0 ml    EDV(MOD-sp4) 107.0 ml    ESV(MOD-sp2) 39.0 ml    ESV(MOD-sp4) 36.0 ml    SV(MOD-sp2) 73.0 ml    SV(MOD-sp4) 71.0 ml    SI(MOD-sp2) 35.8 ml/m2    SI(MOD-sp4) 34.9 ml/m2    EF(MOD-sp2) 65.2 %    EF(MOD-sp4) 66.4 %    MV E max adam 94.7 cm/sec    MV A max adam 77.1 cm/sec    MV dec time 0.21 msec    MV E/A 1.23     Pulm A Revs Dur 0.10 sec    MV A dur 0.11 sec    LA ESV Index (BP) 21.1 ml/m2    Med Peak E' Adam 7.2 cm/sec    Lat Peak E' Adam 7.5 cm/sec    Avg E/e' ratio 12.88     SV(LVOT) 81.0 ml    RV Base 3.7 cm    TAPSE (>1.6) 2.48 cm    RV S' 11.0 cm/sec    Pulm Sys Adam 39.4 cm/sec    Pulm Rose Adam 52.3 cm/sec    Pulm S/D 0.75     Pulm A Revs Adam 32.1 cm/sec    LV V1 max 110.0 cm/sec    LV V1 max PG 4.8 mmHg    LV V1 mean PG 2.00 mmHg    LV V1 VTI 25.4 cm    Ao pk adam 141.0 cm/sec    Ao max PG 8.0 mmHg    Ao mean PG 4.0 mmHg    Ao V2 VTI 32.8 cm    FCO(I,D) 2.47 cm2    MV max PG 3.5 mmHg    MV mean PG 1.43 mmHg    MV V2 VTI 36.8 cm    MV P1/2t 103.6 msec    MVA(P1/2t) 2.12 cm2    MVA(VTI) 2.20 cm2    MV dec slope 268.5 cm/sec2    MR max adam 310.7 cm/sec    MR max PG 38.6 mmHg    TR max adam 295.9 cm/sec    TR max PG 35.0 mmHg    RVSP(TR) 38.0 mmHg    RAP systole 3.0 mmHg    RV V1 max PG 2.7 mmHg    RV V1 max 82.3 cm/sec    RV V1 VTI 18.5 cm    PA V2  max 93.4 cm/sec    PA acc time 0.13 sec    Ao root diam 3.4 cm    BH CV ECHO SHUNT ASSESSMENT PERFORMED (HIDDEN SCRIPTING) 1     Dimensionless Index 0.80 (DI)       Radiology  Adult Transthoracic Echo Complete W/ Cont if Necessary Per Protocol (With Agitated Saline)    Result Date: 7/26/2023    Left ventricular systolic function is normal. Calculated left ventricular EF = 66.1%   Left ventricular diastolic function was normal.   Saline test results are negative.   There is calcification of the aortic valve.   Estimated right ventricular systolic pressure from tricuspid regurgitation is mildly elevated (35-45 mmHg).   Mild pulmonary hypertension is present.     MRI Brain Without Contrast, MRI Cervical Spine Without Contrast    Result Date: 7/25/2023  BRAIN MRI WITHOUT CONTRAST; CERVICAL SPINE MRI WITHOUT GADOLINIUM  Cervical MRI:  HISTORY: Stroke, follow up; R20.2-Paresthesia of skin; W19.XXXA-Unspecified fall, initial encounter; S09.90XA-Unspecified injury of head, initial encounter; R91.1-Solitary pulmonary nodule  COMPARISON:  None.  FINDINGS:  Multiplanar images of the cervical spine obtained without gadolinium.  Axial images obtained from C2-T1.  No acute fracture or subluxation of the cervical spine is seen. There is bony ankylosis noted at C3-C4. There is anterolisthesis of C6 on C7. Intervertebral disc space narrowing is seen at multiple levels. There is no prevertebral edema. No cord signal abnormalities are seen.  C2-C3: Central disc osteophyte complex indents the anterior aspect of the sac, without canal stenosis or neural foraminal narrowing. C3-C4: Central disc osteophyte complex indents the anterior aspect of the thecal sac, without significant canal stenosis. There is mild to moderate bilateral neural foraminal narrowing, secondary to disc disease and facet hypertrophy. C4-C5: Central disc osteophyte complex indents the anterior aspect of the thecal sac, without significant canal stenosis. There is  mild to moderate bilateral neural foraminal narrowing, exacerbated by facet hypertrophy and disc disease. C5-C6: There is a left-sided disc osteophyte complex, which mildly narrows the left side of the canal and left neural foramen. C6-C7: There is no canal stenosis or neural foraminal narrowing. C7-T1: There is no canal stenosis or neural foraminal narrowing.   Brain MRI:    HISTORY: Stroke, follow up; R20.2-Paresthesia of skin; W19.XXXA-Unspecified fall, initial encounter; S09.90XA-Unspecified injury of head, initial encounter; R91.1-Solitary pulmonary nodule  COMPARISON: 07/20/2023.  FINDINGS:  Multiplanar images of the head were obtained without and with gadolinium. Scattered foci of restricted diffusion are identified within the right cerebral hemisphere. Largest area appears to be within the right frontal lobe, measuring approximately 2.4 x 1.5 cm. No areas of diffusion are noted within the left cerebral hemisphere. There is atrophy. There is periventricular and deep white matter microangiopathic change. There is no midline shift or mass effect. Old lacunar infarct is noted within the left basal ganglia. No abnormality is seen on susceptibility weighted imaging. Mucosal thickening is noted within the ethmoid sinuses. There is a trace amount of fluid within the mastoid air cells.       1. Scattered foci of restricted diffusion identified within the right cerebral hemisphere.  2. Degenerative changes of the cervical spine.  This report was finalized on 7/25/2023 11:42 PM by Dr. Rebeca Marshall M.D.      XR Chest 1 View    Result Date: 7/25/2023  PORTABLE CHEST X-RAY  HISTORY: Stroke protocol.  Portable chest x-ray is provided. Correlation: None.  FINDINGS: The cardiomediastinal silhouette is normal. The lungs are clear. The costophrenic sulci are dry and the bones appear normal. There is no pneumothorax.      Negative.  This report was finalized on 7/25/2023 3:17 PM by Dr. Bennett English M.D.      CT  Angiogram Head, CT Angiogram Neck    Result Date: 7/25/2023  CT ANGIOGRAM HEAD-, CT ANGIOGRAM NECK-  INDICATIONS: Left-sided weakness  TECHNIQUE: Radiation dose reduction techniques were utilized, including automated exposure control and exposure modulation based on body size.Noncontrast head CT was performed, followed by enhanced CT angiography of the head and neck. Three-dimensional reconstructions were created, reviewed, and assessed according to NASCET criteria.  COMPARISON: Correlated with head CT from 07/25/2023 at 1536 hours  FINDINGS:  No acute intracranial hemorrhage, midline shift or mass effect. No acute territorial infarct is identified. 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  Mild periventricular hypodensity suggests chronic small vessel ischemic change in a patient this age.    Ventricles, cisterns, cerebral sulci are unremarkable for patient age.  The visualized paranasal sinuses, orbits, mastoid air cells are unremarkable.  The CT angiography images show estimated 50% stenosis in the cavernous segment of the left intracranial ICA. Estimated 70% focal stenosis at the origin of the right ICA (coronal image 149). Estimated 60% focal stenosis at the origin of the left ICA. Otherwise, no hemodynamically significant focal stenosis, aneurysm, or dissection in the cervical carotid or vertebral arteries, or in the arteries at the base of the brain. The left A1 segment is diminutive, the anterior circulation being at least predominantly supplied from the right. The right common carotid artery is partly obscured by attenuation artifact, limiting its assessment. Scattered calcifications are seen elsewhere in the carotid and vertebral arteries without high-grade stenosis (0-49% stenosis).   Facet and uncovertebral joint hypertrophy contribute to neuroforaminal narrowing, more prominent bilaterally at C3/4, C4/5, C5/6. Disc osteophyte complex appears to result in mild central stenosis at C3/4.  The  lung apices show emphysematous changes, mild atelectasis.      1. Estimated 50% stenosis in the cavernous segment of the left intracranial ICA. Estimated 70% focal stenosis at the origin of the right ICA and 60% focal stenosis at the origin of the left ICA. Otherwise, no hemodynamically significant focal stenosis, aneurysm, or dissection in the cervical carotid or vertebral arteries or in the arteries at the base of the brain.  2. No acute intracranial hemorrhage or hydrocephalus. Chronic changes of the brain. No enhancing lesion in brain. If there is further clinical concern, MRI could be considered for further evaluation.  This report was finalized on 7/25/2023 7:24 PM by Dr. Sacha Ayala M.D.      CT Head Without Contrast Stroke Protocol, CT Cervical Spine Without Contrast    Result Date: 7/25/2023  CT HEAD WO CONTRAST STROKE PROTOCOL-, CT CERVICAL SPINE WO CONTRAST-  INDICATIONS: Stroke, numbness  TECHNIQUE: Radiation dose reduction techniques were utilized, including automated exposure control and exposure modulation based on body size. Noncontrast head CT, cervical spine CT  COMPARISON: None available  FINDINGS:  Head CT:  No acute intracranial hemorrhage, midline shift or mass effect. No acute territorial infarct is identified. 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 Basal ganglia mineralization is present.  Mild periventricular hypodensities suggest chronic small vessel ischemic change in a patient this age.  Arterial calcifications are seen at the base of the brain.  Ventricles, cisterns, cerebral sulci are unremarkable for patient age.  The visualized paranasal sinuses, orbits, mastoid air cells are unremarkable.    Cervical spine CT:  No acute fracture is identified. Mild anterolisthesis of C6 on C7, C7 on T1.  Facet and uncovertebral joint hypertrophy contribute to neuroforaminal narrowing, more prominent on the right at C3/4, C5/6, and on the left at C5/6. Disc osteophyte complex  appears to result in mild central stenosis at C3/4.  Bilateral carotid arterial calcifications are present. Tiny subcentimeter thyroid nodules are evident.   Emphysematous and fibronodular changes are seen at the lung apices. A partly included left upper lobe nodule measures 5 mm on axial image 173, possibility of neoplasm not excluded, follow-up chest CT advised.        1. A partly included indeterminate left upper lobe nodule, follow-up chest CT advised. 2. No acute intracranial hemorrhage or hydrocephalus. No acute cervical fracture identified; degenerative changes in cervical spine. If there is further clinical concern, MRI could be considered for further evaluation.  This report was finalized on 7/25/2023 4:04 PM by Dr. Sacha Ayala M.D.         Note Disclaimer: At [facility], we believe that sharing information builds trust and better relationships. You are receiving this note because you recently visited [facility]. It is possible you will see health information before a provider has talked with you about it. This kind of information can be easy to misunderstand. To help you fully understand what it means for your health, we urge you to discuss this note with your provider.

## 2023-07-26 NOTE — PROGRESS NOTES
ED OBSERVATION PROGRESS/DISCHARGE SUMMARY    Date of Admission: 7/25/2023   LOS: 0 days   PCP: Jo Ann Friend, TYRONE    Subjective patient reports presenting symptoms have improved.  Left upper extremity weakness resolved.    Hospital Outcome: 88-year-old male admitted to the observation unit for further evaluation of dizziness and left upper extremity numbness and fall.  CTA head and neck shows 50% stenosis in the cavernous segment of the left intracranial ICA, 70% focal stenosis of the region of the right ICA, and 60% focal stenosis of the region of the left ICA.  MRI brain without contrast shows scattered foci of restricted diffusion within the right cerebral hemisphere.  Patient was seen by neurology and they requested vascular surgery consultation.    Patient was seen by vascular surgery, they would like patient admitted to hospital with tentative plans for revascularization on Monday 7/31.  Cardiology consulted for preop risk assessment.  I discussed case with Dr. Becker with [facility] who has accepted the patient for admission to hospital.    ROS:  General: no fevers, chills  Respiratory: no cough, dyspnea  Cardiovascular: no chest pain, palpitations  Abdomen: No abdominal pain, nausea, vomiting, or diarrhea  Neurologic: No focal weakness    Objective   Physical Exam:  I have reviewed the vital signs.  Temp:  [98 øF (36.7 øC)-98.2 øF (36.8 øC)] 98.1 øF (36.7 øC)  Heart Rate:  [55-67] 57  Resp:  [16-18] 18  BP: (107-142)/(59-84) 107/59  General Appearance:    Alert, cooperative, no distress  Head:    Normocephalic, atraumatic  Eyes:    Sclerae anicteric  Neck:   Supple, no mass  Lungs: Clear to auscultation bilaterally, respirations unlabored  Heart: Regular rate and rhythm, S1 and S2 normal, no murmur, rub or gallop  Abdomen:  Soft, nontender, bowel sounds active, nondistended  Extremities: No clubbing, cyanosis, or edema to lower extremities  Pulses:  2+ and symmetric in distal lower extremities  Skin: No  rashes   Neurologic: Oriented x3, Normal strength to extremities    Results Review:    I have reviewed the labs, radiology results and diagnostic studies.    Results from last 7 days   Lab Units 07/26/23  0406   WBC 10*3/mm3 6.45   HEMOGLOBIN g/dL 11.7*   HEMATOCRIT % 34.9*   PLATELETS 10*3/mm3 211     Results from last 7 days   Lab Units 07/26/23  0406 07/25/23  1503   SODIUM mmol/L 139 139   POTASSIUM mmol/L 3.8 4.0   CHLORIDE mmol/L 106 104   CO2 mmol/L 25.0 24.8   BUN mg/dL 17 17   CREATININE mg/dL 0.86 0.87   CALCIUM mg/dL 9.2 9.8   BILIRUBIN mg/dL 0.4 0.6   ALK PHOS U/L 87 95   ALT (SGPT) U/L 17 23   AST (SGOT) U/L 16 20   GLUCOSE mg/dL 98 103*     Imaging Results (Last 24 Hours)       Procedure Component Value Units Date/Time    MRI Brain Without Contrast [[id number]] Collected: 07/25/23 2330     Updated: 07/25/23 2345    Narrative:      BRAIN MRI WITHOUT CONTRAST; CERVICAL SPINE MRI WITHOUT GADOLINIUM     Cervical MRI:     HISTORY: Stroke, follow up; R20.2-Paresthesia of skin;  W19.XXXA-Unspecified fall, initial encounter; S09.90XA-Unspecified  injury of head, initial encounter; R91.1-Solitary pulmonary nodule     COMPARISON:  None.     FINDINGS:  Multiplanar images of the cervical spine obtained without  gadolinium.  Axial images obtained from C2-T1.     No acute fracture or subluxation of the cervical spine is seen. There is  bony ankylosis noted at C3-C4. There is anterolisthesis of C6 on C7.  Intervertebral disc space narrowing is seen at multiple levels. There is  no prevertebral edema. No cord signal abnormalities are seen.     C2-C3: Central disc osteophyte complex indents the anterior aspect of  the sac, without canal stenosis or neural foraminal narrowing.  C3-C4: Central disc osteophyte complex indents the anterior aspect of  the thecal sac, without significant canal stenosis. There is mild to  moderate bilateral neural foraminal narrowing, secondary to disc disease  and facet hypertrophy.  C4-C5:  Central disc osteophyte complex indents the anterior aspect of  the thecal sac, without significant canal stenosis. There is mild to  moderate bilateral neural foraminal narrowing, exacerbated by facet  hypertrophy and disc disease.  C5-C6: There is a left-sided disc osteophyte complex, which mildly  narrows the left side of the canal and left neural foramen.  C6-C7: There is no canal stenosis or neural foraminal narrowing.  C7-T1: There is no canal stenosis or neural foraminal narrowing.        Brain MRI:           HISTORY: Stroke, follow up; R20.2-Paresthesia of skin;  W19.XXXA-Unspecified fall, initial encounter; S09.90XA-Unspecified  injury of head, initial encounter; R91.1-Solitary pulmonary nodule     COMPARISON: 07/20/2023.     FINDINGS:  Multiplanar images of the head were obtained without and with  gadolinium. Scattered foci of restricted diffusion are identified within  the right cerebral hemisphere. Largest area appears to be within the  right frontal lobe, measuring approximately 2.4 x 1.5 cm. No areas of  diffusion are noted within the left cerebral hemisphere. There is  atrophy. There is periventricular and deep white matter microangiopathic  change. There is no midline shift or mass effect. Old lacunar infarct is  noted within the left basal ganglia. No abnormality is seen on  susceptibility weighted imaging. Mucosal thickening is noted within the  ethmoid sinuses. There is a trace amount of fluid within the mastoid air  cells.          Impression:      1. Scattered foci of restricted diffusion identified within the right  cerebral hemisphere.    2. Degenerative changes of the cervical spine.     This report was finalized on 7/25/2023 11:42 PM by Dr. Rebeca Marshall M.D.       MRI Cervical Spine Without Contrast [[id number]] Collected: 07/25/23 2330     Updated: 07/25/23 2345    Narrative:      BRAIN MRI WITHOUT CONTRAST; CERVICAL SPINE MRI WITHOUT GADOLINIUM     Cervical MRI:     HISTORY: Stroke,  follow up; R20.2-Paresthesia of skin;  W19.XXXA-Unspecified fall, initial encounter; S09.90XA-Unspecified  injury of head, initial encounter; R91.1-Solitary pulmonary nodule     COMPARISON:  None.     FINDINGS:  Multiplanar images of the cervical spine obtained without  gadolinium.  Axial images obtained from C2-T1.     No acute fracture or subluxation of the cervical spine is seen. There is  bony ankylosis noted at C3-C4. There is anterolisthesis of C6 on C7.  Intervertebral disc space narrowing is seen at multiple levels. There is  no prevertebral edema. No cord signal abnormalities are seen.     C2-C3: Central disc osteophyte complex indents the anterior aspect of  the sac, without canal stenosis or neural foraminal narrowing.  C3-C4: Central disc osteophyte complex indents the anterior aspect of  the thecal sac, without significant canal stenosis. There is mild to  moderate bilateral neural foraminal narrowing, secondary to disc disease  and facet hypertrophy.  C4-C5: Central disc osteophyte complex indents the anterior aspect of  the thecal sac, without significant canal stenosis. There is mild to  moderate bilateral neural foraminal narrowing, exacerbated by facet  hypertrophy and disc disease.  C5-C6: There is a left-sided disc osteophyte complex, which mildly  narrows the left side of the canal and left neural foramen.  C6-C7: There is no canal stenosis or neural foraminal narrowing.  C7-T1: There is no canal stenosis or neural foraminal narrowing.        Brain MRI:           HISTORY: Stroke, follow up; R20.2-Paresthesia of skin;  W19.XXXA-Unspecified fall, initial encounter; S09.90XA-Unspecified  injury of head, initial encounter; R91.1-Solitary pulmonary nodule     COMPARISON: 07/20/2023.     FINDINGS:  Multiplanar images of the head were obtained without and with  gadolinium. Scattered foci of restricted diffusion are identified within  the right cerebral hemisphere. Largest area appears to be within  the  right frontal lobe, measuring approximately 2.4 x 1.5 cm. No areas of  diffusion are noted within the left cerebral hemisphere. There is  atrophy. There is periventricular and deep white matter microangiopathic  change. There is no midline shift or mass effect. Old lacunar infarct is  noted within the left basal ganglia. No abnormality is seen on  susceptibility weighted imaging. Mucosal thickening is noted within the  ethmoid sinuses. There is a trace amount of fluid within the mastoid air  cells.          Impression:      1. Scattered foci of restricted diffusion identified within the right  cerebral hemisphere.    2. Degenerative changes of the cervical spine.     This report was finalized on 7/25/2023 11:42 PM by Dr. Rebeca Marshall M.D.       CT Angiogram Head [[id number]] Collected: 07/25/23 1912     Updated: 07/25/23 1927    Narrative:      CT ANGIOGRAM HEAD-, CT ANGIOGRAM NECK-     INDICATIONS: Left-sided weakness     TECHNIQUE: Radiation dose reduction techniques were utilized, including  automated exposure control and exposure modulation based on body  size.Noncontrast head CT was performed, followed by enhanced CT  angiography of the head and neck. Three-dimensional reconstructions were  created, reviewed, and assessed according to NASCET criteria.     COMPARISON: Correlated with head CT from 07/25/2023 at 1536 hours     FINDINGS:     No acute intracranial hemorrhage, midline shift or mass effect. No acute  territorial infarct is identified. 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     Mild periventricular hypodensity suggests chronic small vessel ischemic  change in a patient this age.           Ventricles, cisterns, cerebral sulci are unremarkable for patient age.     The visualized paranasal sinuses, orbits, mastoid air cells are  unremarkable.      The CT angiography images show estimated 50% stenosis in the cavernous  segment of the left intracranial ICA. Estimated 70% focal  stenosis at  the origin of the right ICA (coronal image 149). Estimated 60% focal  stenosis at the origin of the left ICA. Otherwise, no hemodynamically  significant focal stenosis, aneurysm, or dissection in the cervical  carotid or vertebral arteries, or in the arteries at the base of the  brain. The left A1 segment is diminutive, the anterior circulation being  at least predominantly supplied from the right. The right common carotid  artery is partly obscured by attenuation artifact, limiting its  assessment. Scattered calcifications are seen elsewhere in the carotid  and vertebral arteries without high-grade stenosis (0-49% stenosis).        Facet and uncovertebral joint hypertrophy contribute to neuroforaminal  narrowing, more prominent bilaterally at C3/4, C4/5, C5/6. Disc  osteophyte complex appears to result in mild central stenosis at C3/4.     The lung apices show emphysematous changes, mild atelectasis.       Impression:      1. Estimated 50% stenosis in the cavernous segment of the left  intracranial ICA. Estimated 70% focal stenosis at the origin of the  right ICA and 60% focal stenosis at the origin of the left ICA.  Otherwise, no hemodynamically significant focal stenosis, aneurysm, or  dissection in the cervical carotid or vertebral arteries or in the  arteries at the base of the brain.     2. No acute intracranial hemorrhage or hydrocephalus. Chronic changes of  the brain. No enhancing lesion in brain. If there is further clinical  concern, MRI could be considered for further evaluation.     This report was finalized on 7/25/2023 7:24 PM by Dr. Sacha Ayala M.D.       CT Angiogram Neck [[id number]] Collected: 07/25/23 1912     Updated: 07/25/23 1927    Narrative:      CT ANGIOGRAM HEAD-, CT ANGIOGRAM NECK-     INDICATIONS: Left-sided weakness     TECHNIQUE: Radiation dose reduction techniques were utilized, including  automated exposure control and exposure modulation based on  body  size.Noncontrast head CT was performed, followed by enhanced CT  angiography of the head and neck. Three-dimensional reconstructions were  created, reviewed, and assessed according to NASCET criteria.     COMPARISON: Correlated with head CT from 07/25/2023 at 1536 hours     FINDINGS:     No acute intracranial hemorrhage, midline shift or mass effect. No acute  territorial infarct is identified. 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     Mild periventricular hypodensity suggests chronic small vessel ischemic  change in a patient this age.           Ventricles, cisterns, cerebral sulci are unremarkable for patient age.     The visualized paranasal sinuses, orbits, mastoid air cells are  unremarkable.      The CT angiography images show estimated 50% stenosis in the cavernous  segment of the left intracranial ICA. Estimated 70% focal stenosis at  the origin of the right ICA (coronal image 149). Estimated 60% focal  stenosis at the origin of the left ICA. Otherwise, no hemodynamically  significant focal stenosis, aneurysm, or dissection in the cervical  carotid or vertebral arteries, or in the arteries at the base of the  brain. The left A1 segment is diminutive, the anterior circulation being  at least predominantly supplied from the right. The right common carotid  artery is partly obscured by attenuation artifact, limiting its  assessment. Scattered calcifications are seen elsewhere in the carotid  and vertebral arteries without high-grade stenosis (0-49% stenosis).        Facet and uncovertebral joint hypertrophy contribute to neuroforaminal  narrowing, more prominent bilaterally at C3/4, C4/5, C5/6. Disc  osteophyte complex appears to result in mild central stenosis at C3/4.     The lung apices show emphysematous changes, mild atelectasis.       Impression:      1. Estimated 50% stenosis in the cavernous segment of the left  intracranial ICA. Estimated 70% focal stenosis at the origin of  the  right ICA and 60% focal stenosis at the origin of the left ICA.  Otherwise, no hemodynamically significant focal stenosis, aneurysm, or  dissection in the cervical carotid or vertebral arteries or in the  arteries at the base of the brain.     2. No acute intracranial hemorrhage or hydrocephalus. Chronic changes of  the brain. No enhancing lesion in brain. If there is further clinical  concern, MRI could be considered for further evaluation.     This report was finalized on 7/25/2023 7:24 PM by Dr. Sacha Ayala M.D.       CT Head Without Contrast Stroke Protocol [[id number]] Collected: 07/25/23 1553     Updated: 07/25/23 1607    Narrative:      CT HEAD WO CONTRAST STROKE PROTOCOL-, CT CERVICAL SPINE WO CONTRAST-     INDICATIONS: Stroke, numbness     TECHNIQUE: Radiation dose reduction techniques were utilized, including  automated exposure control and exposure modulation based on body size.  Noncontrast head CT, cervical spine CT     COMPARISON: None available     FINDINGS:     Head CT:     No acute intracranial hemorrhage, midline shift or mass effect. No acute  territorial infarct is identified. 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 Basal ganglia mineralization is present.     Mild periventricular hypodensities suggest chronic small vessel ischemic  change in a patient this age.     Arterial calcifications are seen at the base of the brain.     Ventricles, cisterns, cerebral sulci are unremarkable for patient age.     The visualized paranasal sinuses, orbits, mastoid air cells are  unremarkable.           Cervical spine CT:     No acute fracture is identified. Mild anterolisthesis of C6 on C7, C7 on  T1.     Facet and uncovertebral joint hypertrophy contribute to neuroforaminal  narrowing, more prominent on the right at C3/4, C5/6, and on the left at  C5/6. Disc osteophyte complex appears to result in mild central stenosis  at C3/4.     Bilateral carotid arterial calcifications are  present. Tiny  subcentimeter thyroid nodules are evident.        Emphysematous and fibronodular changes are seen at the lung apices. A  partly included left upper lobe nodule measures 5 mm on axial image 173,  possibility of neoplasm not excluded, follow-up chest CT advised.             Impression:      1. A partly included indeterminate left upper lobe nodule, follow-up  chest CT advised.  2. No acute intracranial hemorrhage or hydrocephalus. No acute cervical  fracture identified; degenerative changes in cervical spine. If there is  further clinical concern, MRI could be considered for further  evaluation.     This report was finalized on 7/25/2023 4:04 PM by Dr. Sacha Ayala M.D.       CT Cervical Spine Without Contrast [[id number]] Collected: 07/25/23 1553     Updated: 07/25/23 1607    Narrative:      CT HEAD WO CONTRAST STROKE PROTOCOL-, CT CERVICAL SPINE WO CONTRAST-     INDICATIONS: Stroke, numbness     TECHNIQUE: Radiation dose reduction techniques were utilized, including  automated exposure control and exposure modulation based on body size.  Noncontrast head CT, cervical spine CT     COMPARISON: None available     FINDINGS:     Head CT:     No acute intracranial hemorrhage, midline shift or mass effect. No acute  territorial infarct is identified. 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 Basal ganglia mineralization is present.     Mild periventricular hypodensities suggest chronic small vessel ischemic  change in a patient this age.     Arterial calcifications are seen at the base of the brain.     Ventricles, cisterns, cerebral sulci are unremarkable for patient age.     The visualized paranasal sinuses, orbits, mastoid air cells are  unremarkable.           Cervical spine CT:     No acute fracture is identified. Mild anterolisthesis of C6 on C7, C7 on  T1.     Facet and uncovertebral joint hypertrophy contribute to neuroforaminal  narrowing, more prominent on the right at C3/4, C5/6,  and on the left at  C5/6. Disc osteophyte complex appears to result in mild central stenosis  at C3/4.     Bilateral carotid arterial calcifications are present. Tiny  subcentimeter thyroid nodules are evident.        Emphysematous and fibronodular changes are seen at the lung apices. A  partly included left upper lobe nodule measures 5 mm on axial image 173,  possibility of neoplasm not excluded, follow-up chest CT advised.             Impression:      1. A partly included indeterminate left upper lobe nodule, follow-up  chest CT advised.  2. No acute intracranial hemorrhage or hydrocephalus. No acute cervical  fracture identified; degenerative changes in cervical spine. If there is  further clinical concern, MRI could be considered for further  evaluation.     This report was finalized on 7/25/2023 4:04 PM by Dr. Sacha Ayala M.D.       XR Chest 1 View [[id number]] Collected: 07/25/23 1517     Updated: 07/25/23 1520    Narrative:      PORTABLE CHEST X-RAY     HISTORY: Stroke protocol.     Portable chest x-ray is provided. Correlation: None.     FINDINGS: The cardiomediastinal silhouette is normal. The lungs are  clear. The costophrenic sulci are dry and the bones appear normal. There  is no pneumothorax.       Impression:      Negative.     This report was finalized on 7/25/2023 3:17 PM by Dr. Bennett English M.D.               I have reviewed the medications.  ---------------------------------------------------------------------------------------------  Assessment & Plan   Assessment/Problem List    Left upper extremity numbness      Plan:    Left upper extremity numbness  Symptomatic right carotid artery stenosis  -CT head without negative  -CTA head and neck shows 50% stenosis in the cavernous segment of the left intracranial ICA, 70% focal stenosis of the region of the right ICA and 60% focal stenosis of the region of the left ICA  -MRI brain without shows scattered foci of restricted diffusion within  the right cerebral hemisphere  -Aspirin and statin  -Neurology consult, Dr. Albrecht   -Vascular surgery consult, Dr. Kendrick would like patient admitted to hospital with tentative plans for revascularization on Monday 7/31  -Cardiology consult for preop risk assessment  -Admit to hospital, Dr. Becker/[facility]     BPH  -Continue terazosin    Disposition: Admit to hospital, Dr. Becker/TRENA    56 minutes has been spent by [facility] providers in the care of this patient while under observation status     This note will serve as progress note/ transfer summary    MARITZA Esparza 07/26/23 07:56 EDT    I have worn appropriate PPE during this patient encounter and sanitized my hands with entering and exiting patient room.

## 2023-07-26 NOTE — PROGRESS NOTES
Chart reviewed.  I was unable to find the patient the hospital I think he is an echocardiogram currently.  It appears to me based on imaging that he is symptomatic right carotid artery high-grade stenosis.  Will touch base with him later this morning and likely recommend carotid revascularization with either carotid endarterectomy versus transcervical carotid artery stent placement.

## 2023-07-26 NOTE — CONSULTS
Patient Name: Magan Lubin  :1935  88 y.o.    Date of Admission: 2023  Date of Consultation:  23  Encounter Provider: Milad Willingham III, MD  Place of Service: [facility] CARDIOLOGY  Referring Provider: Vandana Abdalla MD  Patient Care Team:  Jo Ann Friend APRN as PCP - General (Nurse Practitioner)  Ehsan Grossman MD as Surgeon (Cardiothoracic Surgery)      Chief complaint: Left Arm Weakness, Fall     History of Present Illness:     Patient is an 88-year-old male with a history of carotid artery stenosis, prostate cancer, and hyperlipidemia who presented to [facility] with complaints of left arm weakness and numbness as well as a fall.  For the last 2 to 3 months his family states that at times he seemed just a little bit confused.  They mention he fell and had a fall that resulted in him hitting his head.  He was not sure if he lost consciousness.    This patient has no known cardiac history.  This patient has no history of coronary artery disease, congestive heart failure, rheumatic fever, rheumatic heart disease, congenital heart disease or heart murmur.  This patient has never required invasive cardiovascular evaluation.  In the past he was seen by Dr. Valenzuela at [facility] but no abnormality was found, about 4 years ago he was seen by cardiology at [facility] and again no abnormality was found.    Until last week he has been mowing the yard without any symptoms or difficulty.  No exertional symptoms at all.  He denies any chest pain, pressure, tightness, squeezing, or heartburn.  He has not experienced any feeling of palpitations, tachycardia or heart racing and no presyncope or syncope.  There has not been any problems with dizziness or lightheadedness.  There has not been any orthopnea or PND, and no problems with lower extremity edema.  He denies any shortness of breath at rest or with activity and has not had any wheezing.  He has  not had any problems with unexplained nausea or vomiting. He has continued to perform daily activities of living without any specific problem or change in the level of activity.  He has not been recently hospitalized for any reason.    Workup in the ED revealed a negative CT head, CTA of the head and neck showed 50% stenosis in the cavernous segment of the left intracranial ICA, 70% focal stenosis of the region of the right ICA and 60% focal stenosis of the region of the left ICA. MRI of the brain showed scattered foci of restricted diffusion within the right cerebral hemisphere.     Vascular surgery was consulted to evaluate for cardiovascular revascularization. They are planning a right transcervical carotid artery stent placement .  We have been asked to assess cardiac risk for the anticipated surgical intervention.    ECHO 7/26/23    Left ventricular systolic function is normal. Calculated left ventricular EF = 66.1%    Left ventricular diastolic function was normal.    Saline test results are negative.    There is calcification of the aortic valve.    Estimated right ventricular systolic pressure from tricuspid regurgitation is mildly elevated (35-45 mmHg).    Mild pulmonary hypertension is present.      Past Medical History:   Diagnosis Date    Hyperlipidemia        Past Surgical History:   Procedure Laterality Date    APPENDECTOMY      HERNIA REPAIR      KNEE ARTHROPLASTY, PARTIAL REPLACEMENT Bilateral          Prior to Admission medications    Medication Sig Start Date End Date Taking? Authorizing Provider   aspirin 81 MG EC tablet Take 1 tablet by mouth Daily.   Yes Provider, MD Mark Anthony   atorvastatin (LIPITOR) 10 MG tablet TAKE 1 TABLET BY MOUTH AT BEDTIME  Patient taking differently: Take 1 tablet by mouth Every Night. 6/6/23  Yes Jo Ann Friend APRN   mirtazapine (REMERON) 7.5 MG tablet Take 1 tablet by mouth Every Night.   Yes ProviderMark Anthony MD   terazosin (HYTRIN) 2 MG capsule Take 1  "capsule by mouth Every Night.  Patient taking differently: Take 1 capsule by mouth Daily. 8/29/22  Yes Jo Ann Friend APRN       Allergies   Allergen Reactions    Penicillins Unknown - Low Severity       Social History     Socioeconomic History    Marital status:    Tobacco Use    Smoking status: Former     Packs/day: 1.00     Years: 58.00     Pack years: 58.00     Types: Cigarettes     Start date: 1955     Quit date: 2013     Years since quitting: 10.5    Smokeless tobacco: Never   Vaping Use    Vaping Use: Never used   Substance and Sexual Activity    Alcohol use: Yes     Comment: occasional    Drug use: Defer    Sexual activity: Defer       History reviewed. No pertinent family history.    REVIEW OF SYSTEMS:   All systems reviewed.  Pertinent positives identified in HPI.  All other systems are negative.      Objective:     Vitals:    07/26/23 0345 07/26/23 0756 07/26/23 0822 07/26/23 1213   BP: 107/59  125/66 117/64   BP Location: Left arm  Left leg Left arm   Patient Position: Lying  Sitting Lying   Pulse: 57  54 56   Resp: 18  16 16   Temp: 98.1 øF (36.7 øC)  97.5 øF (36.4 øC) 97.7 øF (36.5 øC)   TempSrc: Oral  Oral Oral   SpO2: 96%  97% 94%   Weight:  81.6 kg (180 lb)     Height:  182.9 cm (72\")       Body mass index is 24.41 kg/mý.    Physical Exam:  General Appearance:    Alert, cooperative, in no acute distress   Head:    Normocephalic, hematoma noted   Eyes:            Lids and lashes normal, conjunctivae and sclerae normal, no icterus, no pallor, corneas clear   Ears:    Ears appear intact with no abnormalities noted   Throat:   No oral lesions, oral mucosa moist   Neck:   No adenopathy, supple, trachea midline, no thyromegaly, no carotid bruit, no JVD   Back:     No kyphosis present, no erythema or scars, no tenderness to palpation    Lungs:     Clear to auscultation,respirations regular, even and unlabored    Heart:    Regular rhythm and normal rate, normal S1 and S2, no murmur, no gallop, no " rub, no click   Chest Wall:    No abnormalities observed   Abdomen:     Normal bowel sounds, no masses, no organomegaly, soft        non-tender, non-distended, no guarding   Extremities:   Moves all extremities well, no edema, no cyanosis, no redness   Pulses:  Bilateral carotids brisk   Skin:  Psychiatric:   No bleeding or rash    Alert and oriented, normal mood and affect         Lab Review:     Results from last 7 days   Lab Units 07/26/23  0406   SODIUM mmol/L 139   POTASSIUM mmol/L 3.8   CHLORIDE mmol/L 106   CO2 mmol/L 25.0   BUN mg/dL 17   CREATININE mg/dL 0.86   CALCIUM mg/dL 9.2   BILIRUBIN mg/dL 0.4   ALK PHOS U/L 87   ALT (SGPT) U/L 17   AST (SGOT) U/L 16   GLUCOSE mg/dL 98         Results from last 7 days   Lab Units 07/26/23  0406   WBC 10*3/mm3 6.45   HEMOGLOBIN g/dL 11.7*   HEMATOCRIT % 34.9*   PLATELETS 10*3/mm3 211     Results from last 7 days   Lab Units 07/25/23  1503   INR  1.06   APTT seconds 32.1         Results from last 7 days   Lab Units 07/25/23  1503   CHOLESTEROL mg/dL 127   TRIGLYCERIDES mg/dL 47   HDL CHOL mg/dL 49   LDL CHOL mg/dL 67               I personally viewed and interpreted the patient's EKG/Telemetry data.    EKG 7/26/23        Current Facility-Administered Medications:     aspirin tablet 325 mg, 325 mg, Oral, Daily, 325 mg at 07/26/23 0850 **OR** aspirin suppository 300 mg, 300 mg, Rectal, Daily, Kimberly Pina APRN    atorvastatin (LIPITOR) tablet 80 mg, 80 mg, Oral, Nightly, Hero Albrecht MD    [START ON 7/27/2023] clopidogrel (PLAVIX) tablet 75 mg, 75 mg, Oral, Daily, Alan Kendrick MD    mirtazapine (REMERON) tablet 7.5 mg, 7.5 mg, Oral, Nightly, Alona Harris APRN, 7.5 mg at 07/25/23 5908    ondansetron (ZOFRAN) injection 4 mg, 4 mg, Intravenous, Q6H PRN, Herth, Kimberly, APRN    sodium chloride 0.9 % flush 10 mL, 10 mL, Intravenous, PRN, Vandana Abdalla MD    sodium chloride 0.9 % flush 10 mL, 10 mL, Intravenous, Q12H, Kimberly Pina, TYRONE, 10 mL at  07/26/23 0844    sodium chloride 0.9 % flush 10 mL, 10 mL, Intravenous, PRN, Herth, Kimberly, APRN    sodium chloride 0.9 % infusion 40 mL, 40 mL, Intravenous, PRN, Herth, Kimberly, APRN    terazosin (HYTRIN) capsule 2 mg, 2 mg, Oral, Nightly, Qadah, Abdalhakim, APRN    Assessment and Plan:       Active Hospital Problems    Diagnosis  POA    **Left upper extremity numbness [R20.0]  Yes    Symptomatic carotid artery stenosis [I65.29]  Yes      Resolved Hospital Problems   No resolved problems to display.     1.  Preoperative cardiac assessment-patient has an estimated risk probability for perioperative myocardial infarction or cardiac arrest of 0.04% (Martin).  He does not meet guideline recommendations for any additional cardiac testing at this time  2.  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  Plans are for right transcervical carotid artery stent placement.  Timing yet to be determined by vascular.    We will sign off, please call if we can help in any way.    Milad Willingham III, MD  07/26/23  15:40 EDT

## 2023-07-26 NOTE — CONSULTS
Name: Magan Lubin ADMIT: 2023   : 1935  PCP: Jo Ann Friend APRN    MRN: [id number] LOS: 0 days   AGE/SEX: 88 y.o. male  ROOM: [facility]/           Inpatient Hospitalist Consult  Consult performed by: Adrian Becker MD  Consult ordered by: Gem Cross PA    Date of Admit: 2023  Date of Consult: 23    Subjective   History of Present Illness  Patient is an 88-year-old gentleman with history of hyperlipidemia, prostate cancer, carotid stenosis, and probably some emerging dementia with recent memory loss issues who was brought in after a fall at home.  Exact circumstances are uncertain as patient has changes history of few times per family.  They state that he had taken a shower and then apparently had either fallen or maybe he was losing his balance because he tore the shower curtain off the malorie and then had apparently crawled into the other room.  He told him he had been weak on his left side.  They state that he was not acting like himself.  Exact onset of the symptoms was uncertain so he was not a candidate for tPA.  He was placed in the observation unit where subsequent imaging has revealed stroke as well as bilateral carotid stenosis.  The right carotid is felt to be the symptomatic lesion causing the stroke and he is felt to need surgical intervention while inpatient.  Cardiology has been asked to see him for clearance.  We are asked to admit.  He voices no complaints at this time.  For what its worth he denies any chest pain recently and has never had any cardiac history.  He states he is able to go up a flight of stairs without any difficulty whatsoever.      Past Medical History:   Diagnosis Date    Hyperlipidemia      Past Surgical History:   Procedure Laterality Date    APPENDECTOMY      HERNIA REPAIR      KNEE ARTHROPLASTY, PARTIAL REPLACEMENT Bilateral      History reviewed. No pertinent family history.  Social History     Tobacco Use    Smoking status: Former      Packs/day: 1.00     Years: 58.00     Pack years: 58.00     Types: Cigarettes     Start date: 1955     Quit date: 2013     Years since quitting: 10.5    Smokeless tobacco: Never   Vaping Use    Vaping Use: Never used   Substance Use Topics    Alcohol use: Yes     Comment: occasional    Drug use: Defer     Medications Prior to Admission   Medication Sig Dispense Refill Last Dose    aspirin 81 MG EC tablet Take 1 tablet by mouth Daily.   7/25/2023    atorvastatin (LIPITOR) 10 MG tablet TAKE 1 TABLET BY MOUTH AT BEDTIME (Patient taking differently: Take 1 tablet by mouth Every Night.) 30 tablet 0 7/24/2023    mirtazapine (REMERON) 7.5 MG tablet Take 1 tablet by mouth Every Night.   7/24/2023    terazosin (HYTRIN) 2 MG capsule Take 1 capsule by mouth Every Night. (Patient taking differently: Take 1 capsule by mouth Daily.) 90 capsule 1 7/25/2023     Allergies:  Penicillins    Review of Systems   Constitutional:  Negative for chills and fatigue.   Respiratory:  Negative for cough and shortness of breath.    Cardiovascular:  Negative for chest pain and leg swelling.   Neurological:  Positive for weakness.        Poor memory developing over the last several months per family   All other systems reviewed and are negative.    Objective      Vital Signs  Temp:  [97.5 øF (36.4 øC)-98.2 øF (36.8 øC)] 97.7 øF (36.5 øC)  Heart Rate:  [54-61] 56  Resp:  [16-18] 16  BP: (107-142)/(59-84) 117/64  Body mass index is 24.41 kg/mý.    Physical Exam  Vitals reviewed.   Constitutional:       General: He is not in acute distress.     Appearance: He is well-developed.   HENT:      Head: Normocephalic and atraumatic.      Mouth/Throat:      Pharynx: No oropharyngeal exudate.   Eyes:      General: No scleral icterus.  Neck:      Vascular: No JVD.   Cardiovascular:      Rate and Rhythm: Normal rate and regular rhythm.      Heart sounds: No murmur heard.  Pulmonary:      Effort: Pulmonary effort is normal. No respiratory distress.       Breath sounds: Normal breath sounds. No wheezing.   Abdominal:      General: There is no distension.      Palpations: Abdomen is soft.      Tenderness: There is no abdominal tenderness.   Musculoskeletal:      Right lower leg: No edema.      Left lower leg: No edema.   Skin:     General: Skin is warm and dry.      Findings: No rash.   Neurological:      Mental Status: He is alert.      Comments: Somewhat of a poor historian.  Strength seems symmetric in the upper and lower extremities.  No obvious facial droop or other cranial nerve deficit.       Results Review:    I reviewed the patient's new clinical results.      Assessment & Plan       Ischemic stroke    Left upper extremity numbness    Symptomatic carotid artery stenosis    Ischemic stroke likely caused by right ICA stenosis.  Discussed with Dr. Kendrick plan is for operative intervention once cardiac clearance is obtained  - Continue aspirin, had been on low-dose at home previously.  - Plavix being added as well  - Atorvastatin though lipids are well controlled  - Echocardiogram reviewed and essentially unremarkable other than some mild pulmonary hypertension which probably is related to his history of smoking    Await cardiac evaluation regarding readiness for carotid surgery.  Does not appear to have any active cardiac symptoms or any significant history.  No medical contraindication to proceeding with surgery whenever.    CT of C-spine did show a DAVIS 5 mm pulmonary nodule.  Family thinks he has had this worked up previously including a CT chest earlier this year.  We will try to get records from his PCP but I do not see anything in the [facility] system.      Thank you very much for asking A to be involved in this patient's care.  We will assume care as primary      Adrian Becker MD  [facility]  07/26/23  15:40 EDT

## 2023-07-26 NOTE — CASE MANAGEMENT/SOCIAL WORK
Discharge Planning Assessment  [facility]     Patient Name: Magan Lubin  MRN: [id number]  Today's Date: 7/26/2023    Admit Date: 7/25/2023    Plan: Plans to return home at laura/Jailyn Corbett RN   Discharge Needs Assessment       Row Name 07/26/23 1159       Discharge Needs Assessment    Equipment Currently Used at Home none    Concerns to be Addressed discharge planning    Anticipated Changes Related to Illness none    Equipment Needed After Discharge none    Provided Post Acute Provider List? N/A    Provided Post Acute Provider Quality & Resource List? N/A      Row Name 07/26/23 1156       Living Environment    People in Home spouse    Name(s) of People in Home Marguerite    Current Living Arrangements home    Potentially Unsafe Housing Conditions none    Primary Care Provided by self    Provides Primary Care For no one    Family Caregiver if Needed spouse    Family Caregiver Names Marguerite    Quality of Family Relationships supportive    Able to Return to Prior Arrangements yes       Resource/Environmental Concerns    Resource/Environmental Concerns none       Transition Planning    Patient/Family Anticipates Transition to home with family    Patient/Family Anticipated Services at Transition other (see comments)  none at present time    Transportation Anticipated family or friend will provide                   Discharge Plan       Row Name 07/26/23 1200       Plan    Plan Plans to return home at laura/Jailyn Corbett RN    Patient/Family in Agreement with Plan yes    Provided Post Acute Provider List? N/A    Provided Post Acute Provider Quality & Resource List? N/A    Plan Comments Spoke w/ patient at bedside w/ his permission. Introduced self and explained role. All info on facesheet, including PCP as ANAHY Friend, verified. Lives in single story house w/ one step to enter, w/ wife Marguerite. Able to navigate step and around home w/o difficulty. Independent w/ ADLs. No assistve devices used. Unsure of any DME or community services  needs at d/c at present time. Uses [facility] in [place] and is able to obtain and pay for meds. To return home at d/c, w/ family's assist; family will transport; agreeable w/ plan. CM to follow-INES Corbett RN                  Continued Care and Services - Admitted Since 7/25/2023    Coordination has not been started for this encounter.          Demographic Summary       Row Name 07/26/23 1154       General Information    Admission Type observation    Arrived From emergency department    Required Notices Provided Observation Status Notice    Referral Source admission list    Reason for Consult discharge planning    Preferred Language English       Contact Information    Permission Granted to Share Info With                    Functional Status       Row Name 07/26/23 1152       Functional Status    Usual Activity Tolerance moderate    Current Activity Tolerance moderate       Functional Status, IADL    Medications independent    IADL Comments wife does most meal prep, housekeeping, laundry and shopping       Mental Status    General Appearance WDL WDL       Mental Status Summary    Recent Changes in Mental Status/Cognitive Functioning no changes                   Psychosocial       Row Name 07/26/23 1156       Behavior WDL    Behavior WDL WDL       Emotion Mood WDL    Emotion/Mood/Affect WDL WDL       Speech WDL    Speech WDL WDL       Perceptual State WDL    Perceptual State WDL WDL       Thought Process WDL    Thought Process WDL WDL       Intellectual Performance WDL    Intellectual Performance WDL WDL    Level of Consciousness Alert                   Abuse/Neglect    No documentation.                  Legal    No documentation.                  Substance Abuse    No documentation.                  Patient Forms    No documentation.                     Yarelis Corbett RN

## 2023-07-26 NOTE — TELEPHONE ENCOUNTER
Caller: TE PAREDES    Relationship: Emergency Contact    Best call back number: [phone]       What was the call regarding:        THE PATIENT'S WIFE SAID THE PATIENT WAS RUSHED TO THE HOSPITAL YESTERDAY. SHE SAID SHE THINK HE MAY HAVE HAD A STROKE. SHE SAID HE WILL RECEIVE TESTING TODAY TO PINPOINT EXACTLY WHAT IS GOING ON WITH HIM.

## 2023-07-26 NOTE — CONSULTS
Name: Magan Lubin ADMIT: 2023   : 1935  PCP: Jo Ann Friend APRN    MRN: [id number] LOS: 0 days   AGE/SEX: 88 y.o. male  ROOM: [address] [place] [address]      Patient Care Team:  Jo Ann Friend APRN as PCP - General (Nurse Practitioner)  Ehsan Grossman MD as Surgeon (Cardiothoracic Surgery)  Chief Complaint   Patient presents with    Extremity Weakness     CC: Symptomatic right carotid artery stenosis.    Subjective     Inpatient Vascular Surgery Consult  Consult performed by: Alan Kendrick MD  Consult ordered by: Alona Harris APRN  Reason for consult: Symptomatic carotid artery stenosis.      History of Present Illness  Patient had left upper extremity weakness.  After full evaluation determined to have multifocal right hemispheric stroke.  Review of Systems   All other systems reviewed and are negative.    Past Medical History:   Diagnosis Date    Hyperlipidemia      Past Surgical History:   Procedure Laterality Date    APPENDECTOMY      HERNIA REPAIR      KNEE ARTHROPLASTY, PARTIAL REPLACEMENT Bilateral      History reviewed. No pertinent family history.    Social History     Tobacco Use    Smoking status: Former     Packs/day: 1.00     Years: 58.00     Pack years: 58.00     Types: Cigarettes     Start date:      Quit date:      Years since quitting: 10.5    Smokeless tobacco: Never   Vaping Use    Vaping Use: Never used   Substance Use Topics    Alcohol use: Yes     Comment: occasional    Drug use: Defer     Medications Prior to Admission   Medication Sig Dispense Refill Last Dose    aspirin 81 MG EC tablet Take 1 tablet by mouth Daily.   2023    atorvastatin (LIPITOR) 10 MG tablet TAKE 1 TABLET BY MOUTH AT BEDTIME (Patient taking differently: Take 1 tablet by mouth Every Night.) 30 tablet 0 2023    mirtazapine (REMERON) 7.5 MG tablet Take 1 tablet by mouth Every Night.   2023    terazosin (HYTRIN) 2 MG capsule Take 1 capsule by mouth Every Night. (Patient  "taking differently: Take 1 capsule by mouth Daily.) 90 capsule 1 7/25/2023     aspirin, 325 mg, Oral, Daily   Or  aspirin, 300 mg, Rectal, Daily  atorvastatin, 80 mg, Oral, Nightly  clopidogrel, 300 mg, Oral, Once  [START ON 7/27/2023] clopidogrel, 75 mg, Oral, Daily  mirtazapine, 7.5 mg, Oral, Nightly  sodium chloride, 10 mL, Intravenous, Q12H  terazosin, 2 mg, Oral, Nightly           ondansetron    sodium chloride    sodium chloride    sodium chloride  Penicillins    Objective     Physical Exam:  Physical Exam  Constitutional:       Appearance: He is well-developed.   Pulmonary:      Effort: Pulmonary effort is normal. No respiratory distress.   Abdominal:      General: There is no distension.      Palpations: Abdomen is soft.   Neurological:      Mental Status: He is alert and oriented to person, place, and time.        Vital Signs and Labs:  Vital Signs Patient Vitals for the past 24 hrs:   BP Temp Temp src Pulse Resp SpO2 Height Weight   07/26/23 0822 125/66 97.5 øF (36.4 øC) Oral 54 16 97 % -- --   07/26/23 0756 -- -- -- -- -- -- 182.9 cm (72\") 81.6 kg (180 lb)   07/26/23 0345 107/59 98.1 øF (36.7 øC) Oral 57 18 96 % -- --   07/25/23 2339 111/84 98.2 øF (36.8 øC) Oral 60 17 94 % -- --   07/25/23 1943 142/69 98.1 øF (36.7 øC) Oral 61 16 94 % 182.9 cm (72\") 81.6 kg (180 lb)   07/25/23 1556 118/69 -- -- 55 -- 98 % -- --   07/25/23 1418 127/75 98 øF (36.7 øC) Oral 67 18 96 % -- --     I/O:  I/O last 3 completed shifts:  In: -   Out: 400 [Urine:400]    CBC    Results from last 7 days   Lab Units 07/26/23  0406 07/25/23  1503   WBC 10*3/mm3 6.45 6.65   HEMOGLOBIN g/dL 11.7* 12.9*   PLATELETS 10*3/mm3 211 218     BMP   Results from last 7 days   Lab Units 07/26/23  0406 07/25/23  1503   SODIUM mmol/L 139 139   POTASSIUM mmol/L 3.8 4.0   CHLORIDE mmol/L 106 104   CO2 mmol/L 25.0 24.8   BUN mg/dL 17 17   CREATININE mg/dL 0.86 0.87   GLUCOSE mg/dL 98 103*     Cr Clearance Estimated Creatinine Clearance: 71.2 mL/min (by " C-G formula based on SCr of 0.86 mg/dL).  Coag   Results from last 7 days   Lab Units 07/25/23  1503   INR  1.06   APTT seconds 32.1     HbA1C   Lab Results   Component Value Date    HGBA1C 5.60 07/25/2023     Blood Glucose   Glucose   Date/Time Value Ref Range Status   07/25/2023 1542 95 70 - 130 mg/dL Final     Comment:     Meter: [id number] : [id number] Josafat Cline ERTech     Infection     CMP   Results from last 7 days   Lab Units 07/26/23  0406 07/25/23  1503   SODIUM mmol/L 139 139   POTASSIUM mmol/L 3.8 4.0   CHLORIDE mmol/L 106 104   CO2 mmol/L 25.0 24.8   BUN mg/dL 17 17   CREATININE mg/dL 0.86 0.87   GLUCOSE mg/dL 98 103*   ALBUMIN g/dL 3.8 4.4   BILIRUBIN mg/dL 0.4 0.6   ALK PHOS U/L 87 95   AST (SGOT) U/L 16 20   ALT (SGPT) U/L 17 23     Radiology(recent) CT Angiogram Neck    Result Date: 7/25/2023  1. Estimated 50% stenosis in the cavernous segment of the left intracranial ICA. Estimated 70% focal stenosis at the origin of the right ICA and 60% focal stenosis at the origin of the left ICA. Otherwise, no hemodynamically significant focal stenosis, aneurysm, or dissection in the cervical carotid or vertebral arteries or in the arteries at the base of the brain.  2. No acute intracranial hemorrhage or hydrocephalus. Chronic changes of the brain. No enhancing lesion in brain. If there is further clinical concern, MRI could be considered for further evaluation.  This report was finalized on 7/25/2023 7:24 PM by Dr. Sacha Ayala M.D.      CT Cervical Spine Without Contrast    Result Date: 7/25/2023  1. A partly included indeterminate left upper lobe nodule, follow-up chest CT advised. 2. No acute intracranial hemorrhage or hydrocephalus. No acute cervical fracture identified; degenerative changes in cervical spine. If there is further clinical concern, MRI could be considered for further evaluation.  This report was finalized on 7/25/2023 4:04 PM by Dr. Sacha Ayala M.D.      MRI Brain  Without Contrast    Result Date: 7/25/2023  1. Scattered foci of restricted diffusion identified within the right cerebral hemisphere.  2. Degenerative changes of the cervical spine.  This report was finalized on 7/25/2023 11:42 PM by Dr. Rebeca Marshall M.D.      MRI Cervical Spine Without Contrast    Result Date: 7/25/2023  1. Scattered foci of restricted diffusion identified within the right cerebral hemisphere.  2. Degenerative changes of the cervical spine.  This report was finalized on 7/25/2023 11:42 PM by Dr. Rebeca Marshall M.D.      XR Chest 1 View    Result Date: 7/25/2023  Negative.  This report was finalized on 7/25/2023 3:17 PM by Dr. Bennett English M.D.      CT Angiogram Head    Result Date: 7/25/2023  1. Estimated 50% stenosis in the cavernous segment of the left intracranial ICA. Estimated 70% focal stenosis at the origin of the right ICA and 60% focal stenosis at the origin of the left ICA. Otherwise, no hemodynamically significant focal stenosis, aneurysm, or dissection in the cervical carotid or vertebral arteries or in the arteries at the base of the brain.  2. No acute intracranial hemorrhage or hydrocephalus. Chronic changes of the brain. No enhancing lesion in brain. If there is further clinical concern, MRI could be considered for further evaluation.  This report was finalized on 7/25/2023 7:24 PM by Dr. Sacha Ayala M.D.      CT Head Without Contrast Stroke Protocol    Result Date: 7/25/2023  1. A partly included indeterminate left upper lobe nodule, follow-up chest CT advised. 2. No acute intracranial hemorrhage or hydrocephalus. No acute cervical fracture identified; degenerative changes in cervical spine. If there is further clinical concern, MRI could be considered for further evaluation.  This report was finalized on 7/25/2023 4:04 PM by Dr. Sacha Ayala M.D.       Active Hospital Problems    Diagnosis  POA    **Left upper extremity numbness [R20.0]  Yes      Resolved  Hospital Problems   No resolved problems to display.     Problem Points:  4:  Patient has a new problem, with additional work-up planned  Total problem points:4 or more    Data Points:  1:  I have reviewed or order clinical lab test  1:  I have reviewed or order radiology test (except heart catheterization or echo)  2:  I have personally and independently review of image, tracing, or specimen  Total data points:4 or more    Risk Points:  High:  Any illness that poses threat to life or body funciton    MDM requires 2/3 (Problem points, Data points and Risk)  MDM Prob point Data point Risk   SF 1 1 Minimal   Low 2 2 Low   Mod 3 3 Moderate   High 4 4 High     Code requires 3/3 (MDM, History and Exam)  Code MDM History Exam Time   55127 SF/Low Detailed Detailed 30   27035 Mod Comprehensive Comprehensive 50   04135 High Comprehensive Comprehensive 70     Detailed history:  4 elements HPI or status of 3 chronic problems; 2-9 system ROS  Comprehensive:  4 elements HPI or status of 3 chronic problems;  10 system ROS    Detailed Exam:  12 findings from any organ system  Comprehensive Exam:  2 findings from each of 9 systems.   [id number]    Assessment & Plan       Left upper extremity numbness      88 y.o. male patient with severe right carotid artery stenosis with ipsilateral right hemispheric multifocal stroke.  The patient had left upper extremity weakness.  This appears to have all resolved.  After reviewing his imaging and evaluating him I feel her best option will be a right transcervical carotid artery stent placement.  This is mainly related to a immobile neck and high lesion.  We will start dual antiplatelet therapy with aspirin and Plavix.  We will also continue his Lipitor.  Cardiology has been asked to evaluate for preoperative indications.  My suspicions are he will be fit for surgery on Monday.  We will work on getting on the operative schedule.    I discussed the patients findings and my recommendations with  patient and family.    Alan Kendrick MD  07/26/23  11:30 EDT    Please call my office with any question: [phone]

## 2023-07-27 ENCOUNTER — TELEPHONE (OUTPATIENT)
Dept: FAMILY MEDICINE CLINIC | Age: 88
End: 2023-07-27
Payer: MEDICARE

## 2023-07-27 LAB
FOLATE SERPL-MCNC: 6.8 NG/ML (ref 4.78–24.2)
TSH SERPL DL<=0.05 MIU/L-ACNC: 2.95 UIU/ML (ref 0.27–4.2)
VIT B12 BLD-MCNC: 412 PG/ML (ref 211–946)

## 2023-07-27 PROCEDURE — 99233 SBSQ HOSP IP/OBS HIGH 50: CPT | Performed by: STUDENT IN AN ORGANIZED HEALTH CARE EDUCATION/TRAINING PROGRAM

## 2023-07-27 PROCEDURE — 97161 PT EVAL LOW COMPLEX 20 MIN: CPT

## 2023-07-27 RX ORDER — DOCUSATE SODIUM 100 MG/1
100 CAPSULE, LIQUID FILLED ORAL 2 TIMES DAILY
Status: DISCONTINUED | OUTPATIENT
Start: 2023-07-27 | End: 2023-08-01 | Stop reason: HOSPADM

## 2023-07-27 RX ORDER — PANTOPRAZOLE SODIUM 40 MG/1
40 TABLET, DELAYED RELEASE ORAL
Status: DISCONTINUED | OUTPATIENT
Start: 2023-07-28 | End: 2023-08-01 | Stop reason: HOSPADM

## 2023-07-27 RX ADMIN — TERAZOSIN 2 MG: 2 CAPSULE ORAL at 21:13

## 2023-07-27 RX ADMIN — MIRTAZAPINE 7.5 MG: 15 TABLET, FILM COATED ORAL at 21:13

## 2023-07-27 RX ADMIN — CLOPIDOGREL BISULFATE 75 MG: 75 TABLET, FILM COATED ORAL at 09:19

## 2023-07-27 RX ADMIN — DOCUSATE SODIUM 100 MG: 100 CAPSULE, LIQUID FILLED ORAL at 23:57

## 2023-07-27 RX ADMIN — ASPIRIN 325 MG: 325 TABLET ORAL at 09:19

## 2023-07-27 RX ADMIN — Medication 10 ML: at 21:14

## 2023-07-27 RX ADMIN — Medication 10 ML: at 09:20

## 2023-07-27 RX ADMIN — ATORVASTATIN CALCIUM 80 MG: 80 TABLET, FILM COATED ORAL at 21:13

## 2023-07-27 NOTE — PLAN OF CARE
Goal Outcome Evaluation:  Plan of Care Reviewed With: patient           Outcome Evaluation: Patient is an 88 y.o male who presents to [facility] following a fall and reports of L UE weakness. Patient found to have had an ischemic stroke. Patient AOx3 supine in bed upon arrival. Patient lives at home with his wife. Patient reports he is independent with ADLs at baseline and does not use an AD. Patient completed all bed mobility with SBA this date. Patient performed STS from EOB with CGA. Patient ambulated 150ft around unit with no AD and CGA-Gabriella. Gait very slow and shuffled with narrow SERGIO. Encouraged use of rwx for further mobility to maximize safety and independence. Patient reclined in chair at end of session. Patient may continue to benefit from skilled PT intervention to address deficits in functional mobility and maximize safety and independence. Anticipate home with assist and HHPT at d/c. PT will continue to monitor and progress as tolerated.      Anticipated Discharge Disposition (PT): home with assist, home with home health

## 2023-07-27 NOTE — PROGRESS NOTES
"Name: Magan Lubin ADMIT: 2023   : 1935  PCP: Jo Ann Friend APRN    MRN: [id number] LOS: 1 days   AGE/SEX: 88 y.o. male  ROOM: [address]    [place], Subsequent Hospital Care    Chief Complaint   Patient presents with    Extremity Weakness     CC: Carotid stenosis follow-up.  Subjective     88 y.o. male no focal complaints this morning.    Review of Systems    Objective     Scheduled Medications:   aspirin, 325 mg, Oral, Daily   Or  aspirin, 300 mg, Rectal, Daily  atorvastatin, 80 mg, Oral, Nightly  clopidogrel, 75 mg, Oral, Daily  mirtazapine, 7.5 mg, Oral, Nightly  sodium chloride, 10 mL, Intravenous, Q12H  terazosin, 2 mg, Oral, Nightly        Active Infusions:       As Needed Medications:    ondansetron    sodium chloride    sodium chloride    sodium chloride    Vital Signs  Vital Signs Patient Vitals for the past 24 hrs:   BP Temp Temp src Pulse Resp SpO2 Height Weight   23 1929 139/74 99.3 øF (37.4 øC) Oral 66 18 95 % -- --   23 1710 107/73 98.8 øF (37.1 øC) Oral 66 18 95 % -- --   23 1213 117/64 97.7 øF (36.5 øC) Oral 56 16 94 % -- --   23 0822 125/66 97.5 øF (36.4 øC) Oral 54 16 97 % -- --   23 0756 -- -- -- -- -- -- 182.9 cm (72\") 81.6 kg (180 lb)     I/O:  I/O last 3 completed shifts:  In: -   Out: 400 [Urine:400]    Physical Exam:  Physical Exam  Constitutional:       Appearance: He is well-developed.   Pulmonary:      Effort: Pulmonary effort is normal. No respiratory distress.   Abdominal:      General: There is no distension.      Palpations: Abdomen is soft.   Neurological:      Mental Status: He is alert and oriented to person, place, and time.        Results Review:     CBC    Results from last 7 days   Lab Units 23  0406 23  1503   WBC 10*3/mm3 6.45 6.65   HEMOGLOBIN g/dL 11.7* 12.9*   PLATELETS 10*3/mm3 211 218     BMP   Results from last 7 days   Lab Units 23  0406 23  1503   SODIUM mmol/L 139 139 "   POTASSIUM mmol/L 3.8 4.0   CHLORIDE mmol/L 106 104   CO2 mmol/L 25.0 24.8   BUN mg/dL 17 17   CREATININE mg/dL 0.86 0.87   GLUCOSE mg/dL 98 103*       Assessment & Plan     Assessment & Plan      Ischemic stroke    Left upper extremity numbness    Symptomatic carotid artery stenosis      88 y.o. male patient with symptomatic right carotid artery stenosis.  Appreciate cardiology's assistance and promptly evaluated the patient.  He appears to be clear for surgery.  He is on the schedule for Monday afternoon for right transcervical carotid artery stent placement.  Case discussed with patient, family yesterday.  Continue dual antiplatelet therapy and high intensity statin.      Alan Kendrick MD  07/27/23  07:29 EDT    Please call my office with any question: [phone]    Active Hospital Problems    Diagnosis  POA    **Ischemic stroke [I63.9]  Yes    Symptomatic carotid artery stenosis [I65.29]  Yes    Left upper extremity numbness [R20.0]  Yes      Resolved Hospital Problems   No resolved problems to display.

## 2023-07-27 NOTE — TELEPHONE ENCOUNTER
Daughter Regine is asking if you will fill out [place] paperwork for her.  She has been at the hospital with her dad since he had a stroke last Tuesday since her mom is elderly and can't be there much.

## 2023-07-27 NOTE — TELEPHONE ENCOUNTER
Spoke to Regine, she would like intermittent starting yesterday 7/26.  She will have [employer] send paperwork.  I advised her that there is a charge for that and she is ok with that.

## 2023-07-27 NOTE — THERAPY EVALUATION
Patient Name: Magan Lubin  : 1935    MRN: [id number]                              Today's Date: 2023       Admit Date: 2023    Visit Dx:     ICD-10-CM ICD-9-CM   1. Paresthesia of left upper extremity  R20.2 782.0   2. Fall, initial encounter  W19.XXXA E888.9   3. Injury of head, initial encounter  S09.90XA 959.01   4. Lung nodule seen on imaging study  R91.1 793.11     Patient Active Problem List   Diagnosis    Bilateral carotid artery stenosis    Asymptomatic carotid artery stenosis    History of prostate cancer    Benign prostatic hyperplasia without lower urinary tract symptoms    Benign prostatic hyperplasia with lower urinary tract symptoms    Prostate cancer    Left upper extremity numbness    Symptomatic carotid artery stenosis    Ischemic stroke     Past Medical History:   Diagnosis Date    Hyperlipidemia      Past Surgical History:   Procedure Laterality Date    APPENDECTOMY      HERNIA REPAIR      KNEE ARTHROPLASTY, PARTIAL REPLACEMENT Bilateral       General Information       Row Name 23 1147          Physical Therapy Time and Intention    Document Type evaluation  -     Mode of Treatment individual therapy;physical therapy  -       Row Name 23 1147          General Information    Patient Profile Reviewed yes  -     Prior Level of Function independent:  -     Existing Precautions/Restrictions fall  -       Row Name 23 1147          Living Environment    People in Home spouse  -       Row Name 23 1147          Home Main Entrance    Number of Stairs, Main Entrance one  -       Row Name 23 1147          Cognition    Orientation Status (Cognition) oriented x 3  -       Row Name 23 1147          Safety Issues, Functional Mobility    Impairments Affecting Function (Mobility) balance;strength;endurance/activity tolerance  -               User Key  (r) = Recorded By, (t) = Taken By, (c) = Cosigned By      Initials Name Provider Type      Dana Michel, SLAVA Physical Therapist                   Mobility       Row Name 07/27/23 1147          Bed Mobility    Bed Mobility supine-sit  -     Supine-Sit [place] (Bed Mobility) standby assist  -     Assistive Device (Bed Mobility) head of bed elevated;bed rails  -     Comment, (Bed Mobility) Patient UIC at end of session  -       Row Name 07/27/23 1147          Sit-Stand Transfer    Sit-Stand [place] (Transfers) contact guard  -       Row Name 07/27/23 1147          Gait/Stairs (Locomotion)    [place] Level (Gait) contact guard;minimum assist (75% patient effort)  -     Assistive Device (Gait) --  no AD  -     Distance in Feet (Gait) 150ft  -     Deviations/Abnormal Patterns (Gait) gait speed decreased;base of support, narrow;festinating/shuffling  -     Comment, (Gait/Stairs) Gait very slow and shuffled with narrow SERGIO. Encouraged use of rwx for further mobility to maximize safety and independence.  -               User Key  (r) = Recorded By, (t) = Taken By, (c) = Cosigned By      Initials Name Provider Type     Dana Michel, SLAVA Physical Therapist                   Obj/Interventions       Row Name 07/27/23 1149          Range of Motion Comprehensive    General Range of Motion bilateral lower extremity ROM WFL  -       Row Name 07/27/23 1149          Strength Comprehensive (MMT)    General Manual Muscle Testing (MMT) Assessment lower extremity strength deficits identified  -     Comment, General Manual Muscle Testing (MMT) Assessment Generalized LE weakness  -       Row Name 07/27/23 1149          Balance    Balance Assessment sitting static balance;sitting dynamic balance;sit to stand dynamic balance;standing dynamic balance;standing static balance  -     Static Sitting Balance standby assist  -     Dynamic Sitting Balance standby assist  -     Position, Sitting Balance sitting edge of bed  -     Sit to Stand Dynamic Balance contact guard  -      Static Standing Balance contact guard  -SM     Dynamic Standing Balance contact guard;minimal assist  -SM     Position/Device Used, Standing Balance unsupported  -SM     Balance Interventions sitting;standing;sit to stand;static;dynamic  -SM               User Key  (r) = Recorded By, (t) = Taken By, (c) = Cosigned By      Initials Name Provider Type     Dana Michel PT Physical Therapist                   Goals/Plan       Row Name 07/27/23 1152          Bed Mobility Goal 1 (PT)    Activity/Assistive Device (Bed Mobility Goal 1, PT) bed mobility activities, all  -SM     [place] Level/Cues Needed (Bed Mobility Goal 1, PT) independent  -SM     Time Frame (Bed Mobility Goal 1, PT) 1 week  -SM       Row Name 07/27/23 1153          Transfer Goal 1 (PT)    Activity/Assistive Device (Transfer Goal 1, PT) sit-to-stand/stand-to-sit;bed-to-chair/chair-to-bed  -SM     [place] Level/Cues Needed (Transfer Goal 1, PT) supervision required  -SM     Time Frame (Transfer Goal 1, PT) 1 week  -SM       Row Name 07/27/23 1156          Gait Training Goal 1 (PT)    Activity/Assistive Device (Gait Training Goal 1, PT) gait (walking locomotion);walker, rolling  -SM     [place] Level (Gait Training Goal 1, PT) supervision required  -SM     Distance (Gait Training Goal 1, PT) 300ft  -SM     Time Frame (Gait Training Goal 1, PT) 1 week  -SM               User Key  (r) = Recorded By, (t) = Taken By, (c) = Cosigned By      Initials Name Provider Type     Dana Michel PT Physical Therapist                   Clinical Impression       Row Name 07/27/23 1154          Pain    Pretreatment Pain Rating 0/10 - no pain  -SM     Posttreatment Pain Rating 0/10 - no pain  -SM       Row Name 07/27/23 1152          Plan of Care Review    Plan of Care Reviewed With patient  -     Outcome Evaluation Patient is an 88 y.o male who presents to [facility] following a fall and reports of L UE weakness. Patient found to have had an ischemic  stroke. Patient AOx3 supine in bed upon arrival. Patient lives at home with his wife. Patient reports he is independent with ADLs at baseline and does not use an AD. Patient completed all bed mobility with SBA this date. Patient performed STS from EOB with CGA. Patient ambulated 150ft around unit with no AD and CGA-Gabriella. Gait very slow and shuffled with narrow SERGIO. Encouraged use of rwx for further mobility to maximize safety and independence. Patient reclined in chair at end of session. Patient may continue to benefit from skilled PT intervention to address deficits in functional mobility and maximize safety and independence. Anticipate home with assist and HHPT at d/c. PT will continue to monitor and progress as tolerated.  -       Row Name 07/27/23 1150          Therapy Assessment/Plan (PT)    Rehab Potential (PT) good, to achieve stated therapy goals  -     Criteria for Skilled Interventions Met (PT) yes  -     Therapy Frequency (PT) 6 times/wk  -       Row Name 07/27/23 1150          Vital Signs    O2 Delivery Pre Treatment room air  -     O2 Delivery Intra Treatment room air  -SM     O2 Delivery Post Treatment room air  -SM     Pre Patient Position Supine  -     Intra Patient Position Standing  -     Post Patient Position Sitting  -SM       Row Name 07/27/23 1150          Positioning and Restraints    Pre-Treatment Position in bed  -     Post Treatment Position chair  -SM     In Chair notified nsg;reclined;call light within reach;encouraged to call for assist;exit alarm on;with family/caregiver  -               User Key  (r) = Recorded By, (t) = Taken By, (c) = Cosigned By      Initials Name Provider Type     Dana Michel, PT Physical Therapist                   Outcome Measures       Row Name 07/27/23 1153 07/27/23 0923       How much help from another person do you currently need...    Turning from your back to your side while in flat bed without using bedrails? 4  -SM 4  -TA     Moving from lying on back to sitting on the side of a flat bed without bedrails? 4  -SM 3  -TA    Moving to and from a bed to a chair (including a wheelchair)? 3  -SM 3  -TA    Standing up from a chair using your arms (e.g., wheelchair, bedside chair)? 3  -SM 3  -TA    Climbing 3-5 steps with a railing? 2  -SM 2  -TA    To walk in hospital room? 3  -SM 3  -TA    AM-PAC 6 Clicks Score (PT) 19  - 18  -TA    Highest level of mobility 6 --> Walked 10 steps or more  - 6 --> Walked 10 steps or more  -TA      Row Name 07/27/23 1153          Functional Assessment    Outcome Measure Options AM-PAC 6 Clicks Basic Mobility (PT)  -               User Key  (r) = Recorded By, (t) = Taken By, (c) = Cosigned By      Initials Name Provider Type    Suzanne MEADOWS RN Registered Nurse     Dana Michel, SLAVA Physical Therapist                                 Physical Therapy Education       Title: PT OT SLP Therapies (Done)       Topic: Physical Therapy (Done)       Point: Mobility training (Done)       Learning Progress Summary             Patient Acceptance, E, VU by  at 7/27/2023 1154                         Point: Home exercise program (Done)       Learning Progress Summary             Patient Acceptance, E, VU by  at 7/27/2023 1154                         Point: Body mechanics (Done)       Learning Progress Summary             Patient Acceptance, E, VU by  at 7/27/2023 1154                         Point: Precautions (Done)       Learning Progress Summary             Patient Acceptance, E, VU by  at 7/27/2023 1154                                         User Key       Initials Effective Dates Name Provider Type Discipline     05/02/22 -  Dana Michel, SLAVA Physical Therapist PT                  PT Recommendation and Plan     Plan of Care Reviewed With: patient  Outcome Evaluation: Patient is an 88 y.o male who presents to [facility] following a fall and reports of L UE weakness. Patient found to  have had an ischemic stroke. Patient AOx3 supine in bed upon arrival. Patient lives at home with his wife. Patient reports he is independent with ADLs at baseline and does not use an AD. Patient completed all bed mobility with SBA this date. Patient performed STS from EOB with CGA. Patient ambulated 150ft around unit with no AD and CGA-Gabriella. Gait very slow and shuffled with narrow SERGIO. Encouraged use of rwx for further mobility to maximize safety and independence. Patient reclined in chair at end of session. Patient may continue to benefit from skilled PT intervention to address deficits in functional mobility and maximize safety and independence. Anticipate home with assist and HHPT at d/c. PT will continue to monitor and progress as tolerated.     Time Calculation:         PT Charges       Row Name 07/27/23 1154             Time Calculation    Start Time 1038  -      Stop Time 1047  -      Time Calculation (min) 9 min  -      PT Received On 07/27/23  -      PT - Next Appointment 07/28/23  -      PT Goal Re-Cert Due Date 08/03/23  -                User Key  (r) = Recorded By, (t) = Taken By, (c) = Cosigned By      Initials Name Provider Type     Dana Michel PT Physical Therapist                  Therapy Charges for Today       Code Description Service Date Service Provider Modifiers Qty    [id number] HC PT EVAL LOW COMPLEXITY 3 7/27/2023 Dana Michel PT GP 1            PT G-Codes  Outcome Measure Options: AM-PAC 6 Clicks Basic Mobility (PT)  AM-PAC 6 Clicks Score (PT): 19  PT Discharge Summary  Anticipated Discharge Disposition (PT): home with assist, home with home health    Dana Michel PT  7/27/2023

## 2023-07-27 NOTE — DISCHARGE PLACEMENT REQUEST
"Magan Webb (88 y.o. Male)       Date of Birth   1935    Social Security Number       Address   [address] [place] [address]    Home Phone   [phone]    MRN   [id number]       Judaism   Rastafarian    Marital Status                               Admission Date   7/25/23    Admission Type   Emergency    Admitting Provider   Adrian Becker MD    Attending Provider   Adrian Becker MD    Department, Room/Bed   [address], [address]       Discharge Date       Discharge Disposition       Discharge Destination                                 Attending Provider: Adrian Becker MD    Allergies: Penicillins    Isolation: None   Infection: None   Code Status: CPR    Ht: 182.9 cm (72\")   Wt: 81.6 kg (180 lb)    Admission Cmt: None   Principal Problem: Ischemic stroke [I63.9]                   Active Insurance as of 7/25/2023       Primary Coverage       Payor Plan Insurance Group Employer/Plan Group    MEDICARE MEDICARE A & B        Payor Plan Address Payor Plan Phone Number Payor Plan Fax Number Effective Dates    [address] [phone]  6/1/2002 - None Entered    [place] [address]         Subscriber Name Subscriber Birth Date Member ID       MAGAN WEBB 1935 [id number]                     Emergency Contacts        (Rel.) Home Phone Work Phone Mobile Phone    TE PAREDES (Spouse) -- -- [phone]    Regine Last (Daughter) -- -- [phone]                "

## 2023-07-27 NOTE — PROGRESS NOTES
"DOS: 2023  NAME: Magan Lubin   : 1935  PCP: Jo Ann Friend APRN  Chief Complaint   Patient presents with    Extremity Weakness     Stroke    Subjective: Left arm still a little weak. States he has a mild headache. No new complaints, new/worsening stroke symptoms. Pt seen in follow up today, however the problem is new to the examiner.      Objective:  Vital signs: /64 (BP Location: Left arm, Patient Position: Lying)   Pulse 63   Temp 98.8 øF (37.1 øC) (Oral)   Resp 16   Ht 182.9 cm (72\")   Wt 81.6 kg (180 lb)   SpO2 94%   BMI 24.41 kg/mý       General appearance: Well developed, well nourished, alert and cooperative.   HEENT: Normocephalic.   Neck: Supple.   Cardiac: Regular rate and rhythm.   Peripheral Vasculature: Radial pulses are equal and symmetric.  Chest Exam: Clear to auscultation bilaterally, no wheezes, no rhonchi.  Extremities: Normal, no edema.   Skin: No rashes or birthmarks.     Higher integrative function: Oriented to self and [facility].  Oriented to July but states the year is .  Mildly impaired recent memory.  Speech fluent.  No neglect.   CN II: Visual fields intact bilaterally.  CN III IV VI: Extraocular movements are full without nystagmus. Pupils are equal, round, and reactive to light.  CN V: Normal facial sensation.  CN VII: Facial movements are symmetric, no weakness.   CN VIII: Auditory acuity is normal.   CN IX & X: Symmetric palatal movement.   CN XI: Sternocleidomastoid and trapezius are normal. No weakness.   CN XII: The tongue is midline.  Motor: Normal strength in right upper extremity and bilateral lower extremities.  Left upper extremity plus out of 5.  No fasciculations, rigidity, spasticity or abnormal movements.   Sensation: Mildly decreased to light touch in left upper extremity.  Station and gait: Deferred.  Coordination: No dysmetria with finger-nose or heel-to-shin.    Scheduled Meds:aspirin, 325 mg, Oral, Daily   Or  aspirin, 300 mg, " Rectal, Daily  atorvastatin, 80 mg, Oral, Nightly  clopidogrel, 75 mg, Oral, Daily  mirtazapine, 7.5 mg, Oral, Nightly  sodium chloride, 10 mL, Intravenous, Q12H  terazosin, 2 mg, Oral, Nightly      Continuous Infusions:   PRN Meds:.  ondansetron    sodium chloride    sodium chloride    sodium chloride    Laboratory results:  Lab Results   Component Value Date    GLUCOSE 98 07/26/2023    CALCIUM 9.2 07/26/2023     07/26/2023    K 3.8 07/26/2023    CO2 25.0 07/26/2023     07/26/2023    BUN 17 07/26/2023    CREATININE 0.86 07/26/2023    BCR 19.8 07/26/2023    ANIONGAP 8.0 07/26/2023     Lab Results   Component Value Date    WBC 6.45 07/26/2023    HGB 11.7 (L) 07/26/2023    HCT 34.9 (L) 07/26/2023    MCV 99.7 (H) 07/26/2023     07/26/2023     Lab Results   Component Value Date    CHOL 127 07/25/2023    CHOL 123 06/07/2022     Lab Results   Component Value Date    HDL 49 07/25/2023    HDL 40 06/07/2022     Lab Results   Component Value Date    LDL 67 07/25/2023    LDL 71 06/07/2022     Lab Results   Component Value Date    TRIG 47 07/25/2023    TRIG 50 06/07/2022         Lab 07/25/23  1503   HEMOGLOBIN A1C 5.60   ECG 7/25 tracings viewed by me, shows normal sinus rhythm with PAC  Review and interpretation of imaging: MRI brain images viewed on me, shows scattered right hemispheric cortical and subcortical strokes.  Adult Transthoracic Echo Complete W/ Cont if Necessary Per Protocol (With Agitated Saline)    Result Date: 7/26/2023    Left ventricular systolic function is normal. Calculated left ventricular EF = 66.1%   Left ventricular diastolic function was normal.   Saline test results are negative.   There is calcification of the aortic valve.   Estimated right ventricular systolic pressure from tricuspid regurgitation is mildly elevated (35-45 mmHg).   Mild pulmonary hypertension is present.     CT Angiogram Neck    Result Date: 7/25/2023  CT ANGIOGRAM HEAD-, CT ANGIOGRAM NECK-  INDICATIONS:  Left-sided weakness  TECHNIQUE: Radiation dose reduction techniques were utilized, including automated exposure control and exposure modulation based on body size.Noncontrast head CT was performed, followed by enhanced CT angiography of the head and neck. Three-dimensional reconstructions were created, reviewed, and assessed according to NASCET criteria.  COMPARISON: Correlated with head CT from 07/25/2023 at 1536 hours  FINDINGS:  No acute intracranial hemorrhage, midline shift or mass effect. No acute territorial infarct is identified. 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  Mild periventricular hypodensity suggests chronic small vessel ischemic change in a patient this age.    Ventricles, cisterns, cerebral sulci are unremarkable for patient age.  The visualized paranasal sinuses, orbits, mastoid air cells are unremarkable.  The CT angiography images show estimated 50% stenosis in the cavernous segment of the left intracranial ICA. Estimated 70% focal stenosis at the origin of the right ICA (coronal image 149). Estimated 60% focal stenosis at the origin of the left ICA. Otherwise, no hemodynamically significant focal stenosis, aneurysm, or dissection in the cervical carotid or vertebral arteries, or in the arteries at the base of the brain. The left A1 segment is diminutive, the anterior circulation being at least predominantly supplied from the right. The right common carotid artery is partly obscured by attenuation artifact, limiting its assessment. Scattered calcifications are seen elsewhere in the carotid and vertebral arteries without high-grade stenosis (0-49% stenosis).   Facet and uncovertebral joint hypertrophy contribute to neuroforaminal narrowing, more prominent bilaterally at C3/4, C4/5, C5/6. Disc osteophyte complex appears to result in mild central stenosis at C3/4.  The lung apices show emphysematous changes, mild atelectasis.      1. Estimated 50% stenosis in the cavernous segment of  the left intracranial ICA. Estimated 70% focal stenosis at the origin of the right ICA and 60% focal stenosis at the origin of the left ICA. Otherwise, no hemodynamically significant focal stenosis, aneurysm, or dissection in the cervical carotid or vertebral arteries or in the arteries at the base of the brain.  2. No acute intracranial hemorrhage or hydrocephalus. Chronic changes of the brain. No enhancing lesion in brain. If there is further clinical concern, MRI could be considered for further evaluation.  This report was finalized on 7/25/2023 7:24 PM by Dr. Sacha Ayala M.D.      CT Cervical Spine Without Contrast    Result Date: 7/25/2023  CT HEAD WO CONTRAST STROKE PROTOCOL-, CT CERVICAL SPINE WO CONTRAST-  INDICATIONS: Stroke, numbness  TECHNIQUE: Radiation dose reduction techniques were utilized, including automated exposure control and exposure modulation based on body size. Noncontrast head CT, cervical spine CT  COMPARISON: None available  FINDINGS:  Head CT:  No acute intracranial hemorrhage, midline shift or mass effect. No acute territorial infarct is identified. 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 Basal ganglia mineralization is present.  Mild periventricular hypodensities suggest chronic small vessel ischemic change in a patient this age.  Arterial calcifications are seen at the base of the brain.  Ventricles, cisterns, cerebral sulci are unremarkable for patient age.  The visualized paranasal sinuses, orbits, mastoid air cells are unremarkable.    Cervical spine CT:  No acute fracture is identified. Mild anterolisthesis of C6 on C7, C7 on T1.  Facet and uncovertebral joint hypertrophy contribute to neuroforaminal narrowing, more prominent on the right at C3/4, C5/6, and on the left at C5/6. Disc osteophyte complex appears to result in mild central stenosis at C3/4.  Bilateral carotid arterial calcifications are present. Tiny subcentimeter thyroid nodules are evident.    Emphysematous and fibronodular changes are seen at the lung apices. A partly included left upper lobe nodule measures 5 mm on axial image 173, possibility of neoplasm not excluded, follow-up chest CT advised.        1. A partly included indeterminate left upper lobe nodule, follow-up chest CT advised. 2. No acute intracranial hemorrhage or hydrocephalus. No acute cervical fracture identified; degenerative changes in cervical spine. If there is further clinical concern, MRI could be considered for further evaluation.  This report was finalized on 7/25/2023 4:04 PM by Dr. Sacha Ayala M.D.      MRI Brain Without Contrast    Result Date: 7/25/2023  BRAIN MRI WITHOUT CONTRAST; CERVICAL SPINE MRI WITHOUT GADOLINIUM  Cervical MRI:  HISTORY: Stroke, follow up; R20.2-Paresthesia of skin; W19.XXXA-Unspecified fall, initial encounter; S09.90XA-Unspecified injury of head, initial encounter; R91.1-Solitary pulmonary nodule  COMPARISON:  None.  FINDINGS:  Multiplanar images of the cervical spine obtained without gadolinium.  Axial images obtained from C2-T1.  No acute fracture or subluxation of the cervical spine is seen. There is bony ankylosis noted at C3-C4. There is anterolisthesis of C6 on C7. Intervertebral disc space narrowing is seen at multiple levels. There is no prevertebral edema. No cord signal abnormalities are seen.  C2-C3: Central disc osteophyte complex indents the anterior aspect of the sac, without canal stenosis or neural foraminal narrowing. C3-C4: Central disc osteophyte complex indents the anterior aspect of the thecal sac, without significant canal stenosis. There is mild to moderate bilateral neural foraminal narrowing, secondary to disc disease and facet hypertrophy. C4-C5: Central disc osteophyte complex indents the anterior aspect of the thecal sac, without significant canal stenosis. There is mild to moderate bilateral neural foraminal narrowing, exacerbated by facet hypertrophy and disc  disease. C5-C6: There is a left-sided disc osteophyte complex, which mildly narrows the left side of the canal and left neural foramen. C6-C7: There is no canal stenosis or neural foraminal narrowing. C7-T1: There is no canal stenosis or neural foraminal narrowing.   Brain MRI:    HISTORY: Stroke, follow up; R20.2-Paresthesia of skin; W19.XXXA-Unspecified fall, initial encounter; S09.90XA-Unspecified injury of head, initial encounter; R91.1-Solitary pulmonary nodule  COMPARISON: 07/20/2023.  FINDINGS:  Multiplanar images of the head were obtained without and with gadolinium. Scattered foci of restricted diffusion are identified within the right cerebral hemisphere. Largest area appears to be within the right frontal lobe, measuring approximately 2.4 x 1.5 cm. No areas of diffusion are noted within the left cerebral hemisphere. There is atrophy. There is periventricular and deep white matter microangiopathic change. There is no midline shift or mass effect. Old lacunar infarct is noted within the left basal ganglia. No abnormality is seen on susceptibility weighted imaging. Mucosal thickening is noted within the ethmoid sinuses. There is a trace amount of fluid within the mastoid air cells.       1. Scattered foci of restricted diffusion identified within the right cerebral hemisphere.  2. Degenerative changes of the cervical spine.  This report was finalized on 7/25/2023 11:42 PM by Dr. Rebeca Marshall M.D.      MRI Cervical Spine Without Contrast    Result Date: 7/25/2023  BRAIN MRI WITHOUT CONTRAST; CERVICAL SPINE MRI WITHOUT GADOLINIUM  Cervical MRI:  HISTORY: Stroke, follow up; R20.2-Paresthesia of skin; W19.XXXA-Unspecified fall, initial encounter; S09.90XA-Unspecified injury of head, initial encounter; R91.1-Solitary pulmonary nodule  COMPARISON:  None.  FINDINGS:  Multiplanar images of the cervical spine obtained without gadolinium.  Axial images obtained from C2-T1.  No acute fracture or subluxation of the  cervical spine is seen. There is bony ankylosis noted at C3-C4. There is anterolisthesis of C6 on C7. Intervertebral disc space narrowing is seen at multiple levels. There is no prevertebral edema. No cord signal abnormalities are seen.  C2-C3: Central disc osteophyte complex indents the anterior aspect of the sac, without canal stenosis or neural foraminal narrowing. C3-C4: Central disc osteophyte complex indents the anterior aspect of the thecal sac, without significant canal stenosis. There is mild to moderate bilateral neural foraminal narrowing, secondary to disc disease and facet hypertrophy. C4-C5: Central disc osteophyte complex indents the anterior aspect of the thecal sac, without significant canal stenosis. There is mild to moderate bilateral neural foraminal narrowing, exacerbated by facet hypertrophy and disc disease. C5-C6: There is a left-sided disc osteophyte complex, which mildly narrows the left side of the canal and left neural foramen. C6-C7: There is no canal stenosis or neural foraminal narrowing. C7-T1: There is no canal stenosis or neural foraminal narrowing.   Brain MRI:    HISTORY: Stroke, follow up; R20.2-Paresthesia of skin; W19.XXXA-Unspecified fall, initial encounter; S09.90XA-Unspecified injury of head, initial encounter; R91.1-Solitary pulmonary nodule  COMPARISON: 07/20/2023.  FINDINGS:  Multiplanar images of the head were obtained without and with gadolinium. Scattered foci of restricted diffusion are identified within the right cerebral hemisphere. Largest area appears to be within the right frontal lobe, measuring approximately 2.4 x 1.5 cm. No areas of diffusion are noted within the left cerebral hemisphere. There is atrophy. There is periventricular and deep white matter microangiopathic change. There is no midline shift or mass effect. Old lacunar infarct is noted within the left basal ganglia. No abnormality is seen on susceptibility weighted imaging. Mucosal thickening is  noted within the ethmoid sinuses. There is a trace amount of fluid within the mastoid air cells.       1. Scattered foci of restricted diffusion identified within the right cerebral hemisphere.  2. Degenerative changes of the cervical spine.  This report was finalized on 7/25/2023 11:42 PM by Dr. Rebeca Marshall M.D.      XR Chest 1 View    Result Date: 7/25/2023  PORTABLE CHEST X-RAY  HISTORY: Stroke protocol.  Portable chest x-ray is provided. Correlation: None.  FINDINGS: The cardiomediastinal silhouette is normal. The lungs are clear. The costophrenic sulci are dry and the bones appear normal. There is no pneumothorax.      Negative.  This report was finalized on 7/25/2023 3:17 PM by Dr. Bennett English M.D.      CT Angiogram Head    Result Date: 7/25/2023  CT ANGIOGRAM HEAD-, CT ANGIOGRAM NECK-  INDICATIONS: Left-sided weakness  TECHNIQUE: Radiation dose reduction techniques were utilized, including automated exposure control and exposure modulation based on body size.Noncontrast head CT was performed, followed by enhanced CT angiography of the head and neck. Three-dimensional reconstructions were created, reviewed, and assessed according to NASCET criteria.  COMPARISON: Correlated with head CT from 07/25/2023 at 1536 hours  FINDINGS:  No acute intracranial hemorrhage, midline shift or mass effect. No acute territorial infarct is identified. 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  Mild periventricular hypodensity suggests chronic small vessel ischemic change in a patient this age.    Ventricles, cisterns, cerebral sulci are unremarkable for patient age.  The visualized paranasal sinuses, orbits, mastoid air cells are unremarkable.  The CT angiography images show estimated 50% stenosis in the cavernous segment of the left intracranial ICA. Estimated 70% focal stenosis at the origin of the right ICA (coronal image 149). Estimated 60% focal stenosis at the origin of the left ICA. Otherwise, no  hemodynamically significant focal stenosis, aneurysm, or dissection in the cervical carotid or vertebral arteries, or in the arteries at the base of the brain. The left A1 segment is diminutive, the anterior circulation being at least predominantly supplied from the right. The right common carotid artery is partly obscured by attenuation artifact, limiting its assessment. Scattered calcifications are seen elsewhere in the carotid and vertebral arteries without high-grade stenosis (0-49% stenosis).   Facet and uncovertebral joint hypertrophy contribute to neuroforaminal narrowing, more prominent bilaterally at C3/4, C4/5, C5/6. Disc osteophyte complex appears to result in mild central stenosis at C3/4.  The lung apices show emphysematous changes, mild atelectasis.      1. Estimated 50% stenosis in the cavernous segment of the left intracranial ICA. Estimated 70% focal stenosis at the origin of the right ICA and 60% focal stenosis at the origin of the left ICA. Otherwise, no hemodynamically significant focal stenosis, aneurysm, or dissection in the cervical carotid or vertebral arteries or in the arteries at the base of the brain.  2. No acute intracranial hemorrhage or hydrocephalus. Chronic changes of the brain. No enhancing lesion in brain. If there is further clinical concern, MRI could be considered for further evaluation.  This report was finalized on 7/25/2023 7:24 PM by Dr. Sacha Ayala M.D.      CT Head Without Contrast Stroke Protocol    Result Date: 7/25/2023  CT HEAD WO CONTRAST STROKE PROTOCOL-, CT CERVICAL SPINE WO CONTRAST-  INDICATIONS: Stroke, numbness  TECHNIQUE: Radiation dose reduction techniques were utilized, including automated exposure control and exposure modulation based on body size. Noncontrast head CT, cervical spine CT  COMPARISON: None available  FINDINGS:  Head CT:  No acute intracranial hemorrhage, midline shift or mass effect. No acute territorial infarct is identified. Old  bilateral basal ganglia infarcts are apparent. Basal ganglia mineralization is present.  Mild periventricular hypodensities suggest chronic small vessel ischemic change in a patient this age.  Arterial calcifications are seen at the base of the brain.  Ventricles, cisterns, cerebral sulci are unremarkable for patient age.  The visualized paranasal sinuses, orbits, mastoid air cells are unremarkable.    Cervical spine CT:  No acute fracture is identified. Mild anterolisthesis of C6 on C7, C7 on T1.  Facet and uncovertebral joint hypertrophy contribute to neuroforaminal narrowing, more prominent on the right at C3/4, C5/6, and on the left at C5/6. Disc osteophyte complex appears to result in mild central stenosis at C3/4.  Bilateral carotid arterial calcifications are present. Tiny subcentimeter thyroid nodules are evident.   Emphysematous and fibronodular changes are seen at the lung apices. A partly included left upper lobe nodule measures 5 mm on axial image 173, possibility of neoplasm not excluded, follow-up chest CT advised.        1. A partly included indeterminate left upper lobe nodule, follow-up chest CT advised. 2. No acute intracranial hemorrhage or hydrocephalus. No acute cervical fracture identified; degenerative changes in cervical spine. If there is further clinical concern, MRI could be considered for further evaluation.  This report was finalized on 7/25/2023 4:04 PM by Dr. Sacha Ayala M.D.       Impression:  88-year-old male, former smoker, with history of carotid stenosis, prostate cancer, and hyperlipidemia who presented 7/25 with complaints that he woke up at 4 AM on the date of admission because he was not feeling well.  Then he went to the restroom and had left-sided weakness and numbness and fell on the floor hitting his head.  No previous history of stroke or TIA.  He was taking aspirin 81 mg and Lipitor 10 mg daily prior to arrival.    Work-up:  CT head: No acute intracranial  findings.  Partially included indeterminate left upper lobe nodule, follow-up chest CT advised.  CT head/neck: 50% left ICA stenosis in the cavernous segment, 70% stenosis at the origin of the right ICA and 60% focal stenosis at the origin of the left ICA.  MRI brain without contrast: Scattered strokes in the right cerebral hemisphere.  MRI cervical spine without: Degenerative changes noted but no acute findings, no abnormal cord signal.  2D echo: EF 66%, normal left atrial cavity size, saline test results negative.  Moderate mitral annular calcification.  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    Lipitor increased to 80 mg daily  Add PPI, can stop once off DAPT  No further work-up needed from cardiac standpoint prior to surgery.  Vascular surgery planning on right transcervical carotid artery stent placement on Monday.    Neurochecks  BP control  Stroke Education  SUDHAKAR/SCDs  PT/OT/ST  The patient was d/w Dr Albrecht, neurology will sign off but please call with further questions/concerns. Will arrange outpatient neurology f/u in 3 mo.

## 2023-07-27 NOTE — PLAN OF CARE
Goal Outcome Evaluation:                Outcome Evaluation: pt is A&Ox2, pt up with stand by assist, pt to have sx Monday morning, VSS, family updated, Will continue to monitor rest of shift.

## 2023-07-27 NOTE — TELEPHONE ENCOUNTER
I will complete [facility] paperwork for Regine.  Need more information -  continuous or intermittent?  Starting date (and stopping date if continuous).

## 2023-07-27 NOTE — CASE MANAGEMENT/SOCIAL WORK
Continued Stay Note  [facility]     Patient Name: Magan Lubni  MRN: [id number]  Today's Date: 7/27/2023    Admit Date: 7/25/2023    Plan: Home with HH - referral pending   Discharge Plan       Row Name 07/27/23 1255       Plan    Plan Home with HH - referral pending    Patient/Family in Agreement with Plan yes    Plan Comments CCP spoke with patient, patient's daughters, and patient's spouse at bedside regarding the discharge plan. Patient ambulated 150ft with PT; PT recommending home with HH. Family has no HH agency preference and agreeable to referral placed to [facility] (pending). Patient resides at [address] [place] [address]. CCP to follow. Hao MAXWELL LCSW                   Discharge Codes    No documentation.                 Expected Discharge Date and Time       Expected Discharge Date Expected Discharge Time    Aug 1, 2023             Hao MAXWELL LCSW

## 2023-07-28 PROBLEM — R91.1 LEFT UPPER LOBE PULMONARY NODULE: Status: ACTIVE | Noted: 2023-07-28

## 2023-07-28 PROCEDURE — 97530 THERAPEUTIC ACTIVITIES: CPT

## 2023-07-28 RX ORDER — ATORVASTATIN CALCIUM 10 MG/1
TABLET, FILM COATED ORAL
Qty: 30 TABLET | Refills: 0 | OUTPATIENT
Start: 2023-07-28

## 2023-07-28 RX ADMIN — ASPIRIN 325 MG: 325 TABLET ORAL at 08:39

## 2023-07-28 RX ADMIN — Medication 10 ML: at 08:39

## 2023-07-28 RX ADMIN — PANTOPRAZOLE SODIUM 40 MG: 40 TABLET, DELAYED RELEASE ORAL at 06:25

## 2023-07-28 RX ADMIN — MIRTAZAPINE 7.5 MG: 15 TABLET, FILM COATED ORAL at 23:29

## 2023-07-28 RX ADMIN — DOCUSATE SODIUM 100 MG: 100 CAPSULE, LIQUID FILLED ORAL at 08:39

## 2023-07-28 RX ADMIN — Medication 10 ML: at 23:32

## 2023-07-28 RX ADMIN — DOCUSATE SODIUM 100 MG: 100 CAPSULE, LIQUID FILLED ORAL at 23:31

## 2023-07-28 RX ADMIN — ATORVASTATIN CALCIUM 80 MG: 80 TABLET, FILM COATED ORAL at 23:30

## 2023-07-28 RX ADMIN — CLOPIDOGREL BISULFATE 75 MG: 75 TABLET, FILM COATED ORAL at 08:39

## 2023-07-28 NOTE — PROGRESS NOTES
Name: Magan Lubin ADMIT: 2023   : 1935  PCP: Jo Ann Friend APRN    MRN: [id number] LOS: 2 days   AGE/SEX: 88 y.o. male  ROOM: [facility]     Subjective   Subjective   Resting in bed. No family at bedside. He denies any chest pain, dyspnea, cough, fever, chills, nausea or vomiting. States he did have a mild HA this AM that has resolved. Waiting stent placement to his carotid on Monday.      Objective   Objective   Vital Signs  Temp:  [97.3 øF (36.3 øC)-98 øF (36.7 øC)] 97.9 øF (36.6 øC)  Heart Rate:  [55-62] 55  Resp:  [16-18] 16  BP: (108-113)/(45-67) 108/51  SpO2:  [94 %-97 %] 95 %  on   ;   Device (Oxygen Therapy): room air  Body mass index is 24.41 kg/mý.    Physical Exam  Vitals and nursing note reviewed.   Constitutional:       Appearance: He is not toxic-appearing.   Cardiovascular:      Rate and Rhythm: Normal rate and regular rhythm.      Pulses: Normal pulses.   Pulmonary:      Effort: Pulmonary effort is normal. No respiratory distress.      Breath sounds: Normal breath sounds.   Abdominal:      General: Bowel sounds are normal. There is no distension.      Palpations: Abdomen is soft.      Tenderness: There is no abdominal tenderness.   Musculoskeletal:         General: No swelling. Normal range of motion.      Cervical back: Normal range of motion and neck supple.   Skin:     General: Skin is warm and dry.      Findings: No bruising.   Neurological:      Mental Status: He is alert.      Sensory: Sensory deficit (a little diminished sensation to LLE) present.      Comments: Mildly confused- not clear to the year/day. Said we are at [facility].    Psychiatric:         Mood and Affect: Mood normal.         Behavior: Behavior normal.       Results Review:       I reviewed the patient's new clinical results.  Results from last 7 days   Lab Units 23  0406 23  1503   WBC 10*3/mm3 6.45 6.65   HEMOGLOBIN g/dL 11.7* 12.9*   PLATELETS 10*3/mm3 211 218     Results from last 7 days    Lab Units 07/26/23  0406 07/25/23  1503   SODIUM mmol/L 139 139   POTASSIUM mmol/L 3.8 4.0   CHLORIDE mmol/L 106 104   CO2 mmol/L 25.0 24.8   BUN mg/dL 17 17   CREATININE mg/dL 0.86 0.87   GLUCOSE mg/dL 98 103*   Estimated Creatinine Clearance: 71.2 mL/min (by C-G formula based on SCr of 0.86 mg/dL).  Results from last 7 days   Lab Units 07/26/23  0406 07/25/23  1503   ALBUMIN g/dL 3.8 4.4   BILIRUBIN mg/dL 0.4 0.6   ALK PHOS U/L 87 95   AST (SGOT) U/L 16 20   ALT (SGPT) U/L 17 23     Results from last 7 days   Lab Units 07/26/23  0406 07/25/23  1503   CALCIUM mg/dL 9.2 9.8   ALBUMIN g/dL 3.8 4.4       Hemoglobin A1C   Date/Time Value Ref Range Status   07/25/2023 1503 5.60 4.80 - 5.60 % Final     Glucose   Date/Time Value Ref Range Status   07/25/2023 1542 95 70 - 130 mg/dL Final     Comment:     Meter: [id number] : [id number] [facility] C[facility]       aspirin, 325 mg, Oral, Daily   Or  aspirin, 300 mg, Rectal, Daily  atorvastatin, 80 mg, Oral, Nightly  clopidogrel, 75 mg, Oral, Daily  docusate sodium, 100 mg, Oral, BID  mirtazapine, 7.5 mg, Oral, Nightly  pantoprazole, 40 mg, Oral, Q AM  sodium chloride, 10 mL, Intravenous, Q12H  terazosin, 2 mg, Oral, Nightly       Diet: Regular/House Diet; Texture: Regular Texture (IDDSI 7); Fluid Consistency: Thin (IDDSI 0)       Assessment/Plan     Active Hospital Problems    Diagnosis  POA    **Ischemic stroke [I63.9]  Yes    Symptomatic carotid artery stenosis [I65.29]  Yes    Left upper extremity numbness [R20.0]  Yes    Benign prostatic hyperplasia without lower urinary tract symptoms [N40.0]  Yes    History of prostate cancer [Z85.46]  Not Applicable      Resolved Hospital Problems   No resolved problems to display.     Mr. Lubin is a 88-year-old male that presented to the hospital with dizziness and left upper extremity numbness as well as a fall at home.  He was initially placed in the observation unit where MRI brain showed scattered embolic looking infarcts in  the right cerebral hemisphere.  CTA head and neck revealed bilateral carotid stenosis.  Neurology felt that the right carotid stenosis was symptomatic leading to the stroke and consulted vascular for further intervention.  Cardiology was consulted for preoperative clearance and [facility] was consulted to care outside of the observation unit.    Acute CVA  Symptomatic carotid artery stenosis  -Neurology evaluated. Recommends  mg daily plus Plavix 75 mg daily for at least 3 months.  -High intensity statin.  -Vascular consulted and plans for right transcervical carotid artery stent placement on Monday.   -Vascular recommends inpatient stay until this occurs.  -Cardiology consulted for preoperative clearance which was obtained.  -Monitor neuro checks.    BPH  History of prostate cancer  -Monitor for any urinary retention.    Discussed with patient and Dr. Becker.    VTE Prophylaxis - SCDs  Code Status - Full code  Disposition - Anticipate discharge home next week after surgery. PT cleared for home with TYRONE Son  [facility]  07/28/23  12:51 EDT

## 2023-07-28 NOTE — PLAN OF CARE
Goal Outcome Evaluation:  Plan of Care Reviewed With: patient           Outcome Evaluation: Patient seen for PT session this AM. Patient supine in bed upon arrival. Patient sat up to EOB with SBA. Patient performed STS from EOB with CGA. Increased time needed to gain balance before ambulated 150ft 2x with CGA. Gait slow and shuffled with narrow SERGIO. Patient declined use of rwx for ambulation. Patient sitting in chair at end of session. Patient with planned procedure for Monday. Encouraged patient to continue ambulating with nsg and family. PT will f/u post op.      Anticipated Discharge Disposition (PT): home with assist, home with home health

## 2023-07-28 NOTE — THERAPY TREATMENT NOTE
Patient Name: Magan Lubin  : 1935    MRN: [id number]                              Today's Date: 2023       Admit Date: 2023    Visit Dx:     ICD-10-CM ICD-9-CM   1. Paresthesia of left upper extremity  R20.2 782.0   2. Fall, initial encounter  W19.XXXA E888.9   3. Injury of head, initial encounter  S09.90XA 959.01   4. Lung nodule seen on imaging study  R91.1 793.11     Patient Active Problem List   Diagnosis    Bilateral carotid artery stenosis    Asymptomatic carotid artery stenosis    History of prostate cancer    Benign prostatic hyperplasia without lower urinary tract symptoms    Benign prostatic hyperplasia with lower urinary tract symptoms    Prostate cancer    Left upper extremity numbness    Symptomatic carotid artery stenosis    Ischemic stroke     Past Medical History:   Diagnosis Date    Hyperlipidemia      Past Surgical History:   Procedure Laterality Date    APPENDECTOMY      HERNIA REPAIR      KNEE ARTHROPLASTY, PARTIAL REPLACEMENT Bilateral       General Information       Row Name 23 1144          Physical Therapy Time and Intention    Document Type therapy note (daily note)  -     Mode of Treatment individual therapy;physical therapy  -       Row Name 23 1144          General Information    Patient Profile Reviewed yes  -     Existing Precautions/Restrictions fall  -       Row Name 23 1144          Cognition    Orientation Status (Cognition) oriented x 3  -       Row Name 23 1144          Safety Issues, Functional Mobility    Impairments Affecting Function (Mobility) balance;strength;endurance/activity tolerance  -               User Key  (r) = Recorded By, (t) = Taken By, (c) = Cosigned By      Initials Name Provider Type     Dana Michel PT Physical Therapist                   Mobility       Row Name 23 1144          Bed Mobility    Bed Mobility supine-sit  -     Supine-Sit [place] (Bed Mobility) standby assist  -      Assistive Device (Bed Mobility) head of bed elevated;bed rails  -       Row Name 07/28/23 1144          Sit-Stand Transfer    Sit-Stand [place] (Transfers) contact guard  -       Row Name 07/28/23 1144          Gait/Stairs (Locomotion)    [place] Level (Gait) contact guard;verbal cues  -     Assistive Device (Gait) other (see comments)  no AD  -SM     Distance in Feet (Gait) 150ft 2x  -SM     Deviations/Abnormal Patterns (Gait) gait speed decreased;base of support, narrow;festinating/shuffling  -     Comment, (Gait/Stairs) Gait slow and shuffled with narrow SERGIO. Patient declined use of rwx for ambulation.  -               User Key  (r) = Recorded By, (t) = Taken By, (c) = Cosigned By      Initials Name Provider Type    Dana Dunaway PT Physical Therapist                   Obj/Interventions       Row Name 07/28/23 1145          Balance    Balance Assessment sitting dynamic balance;sit to stand dynamic balance;sitting static balance;standing static balance;standing dynamic balance  -     Static Sitting Balance standby assist  -     Dynamic Sitting Balance standby assist  -     Position, Sitting Balance sitting edge of bed  -     Sit to Stand Dynamic Balance contact guard  -     Static Standing Balance contact guard  -SM     Dynamic Standing Balance contact guard  -SM     Position/Device Used, Standing Balance unsupported  -     Balance Interventions sitting;standing;sit to stand;static;dynamic  -               User Key  (r) = Recorded By, (t) = Taken By, (c) = Cosigned By      Initials Name Provider Type    Dana Dunaway PT Physical Therapist                   Goals/Plan    No documentation.                  Clinical Impression       Row Name 07/28/23 1146          Pain    Pretreatment Pain Rating 0/10 - no pain  -     Posttreatment Pain Rating 0/10 - no pain  -       Row Name 07/28/23 1146          Plan of Care Review    Plan of Care Reviewed With patient  -      Outcome Evaluation Patient seen for PT session this AM. Patient supine in bed upon arrival. Patient sat up to EOB with SBA. Patient performed STS from EOB with CGA. Increased time needed to gain balance before ambulated 150ft 2x with CGA. Gait slow and shuffled with narrow SERGIO. Patient declined use of rwx for ambulation. Patient sitting in chair at end of session. Patient with planned procedure for Monday. Encouraged patient to continue ambulating with nsg and family. PT will f/u post op.  -       Row Name 07/28/23 1146          Therapy Assessment/Plan (PT)    Therapy Frequency (PT) 5 times/wk  -       Row Name 07/28/23 1146          Vital Signs    Pre Patient Position Supine  -     Intra Patient Position Standing  -SM     Post Patient Position Sitting  -       Row Name 07/28/23 1146          Positioning and Restraints    Pre-Treatment Position in bed  -SM     Post Treatment Position chair  -SM     In Chair notified nsg;call light within reach;encouraged to call for assist;exit alarm on;reclined  -               User Key  (r) = Recorded By, (t) = Taken By, (c) = Cosigned By      Initials Name Provider Type    Dana Dunaway, PT Physical Therapist                   Outcome Measures       Row Name 07/28/23 1148 07/28/23 0825       How much help from another person do you currently need...    Turning from your back to your side while in flat bed without using bedrails? 4  -SM 4  -TA    Moving from lying on back to sitting on the side of a flat bed without bedrails? 4  -SM 4  -TA    Moving to and from a bed to a chair (including a wheelchair)? 3  -SM 3  -TA    Standing up from a chair using your arms (e.g., wheelchair, bedside chair)? 3  -SM 3  -TA    Climbing 3-5 steps with a railing? 2  -SM 2  -TA    To walk in hospital room? 3  -SM 3  -TA    AM-PAC 6 Clicks Score (PT) 19  -SM 19  -TA    Highest level of mobility 6 --> Walked 10 steps or more  -SM 6 --> Walked 10 steps or more  -TA      Row Name  07/28/23 1148          Functional Assessment    Outcome Measure Options AM-PAC 6 Clicks Basic Mobility (PT)  -               User Key  (r) = Recorded By, (t) = Taken By, (c) = Cosigned By      Initials Name Provider Type    Suzanne MEADOWS RN Registered Nurse    Dana Dunaway PT Physical Therapist                                 Physical Therapy Education       Title: PT OT SLP Therapies (Done)       Topic: Physical Therapy (Done)       Point: Mobility training (Done)       Learning Progress Summary             Patient Acceptance, E, VU by  at 7/28/2023 1148    Acceptance, E, VU by  at 7/27/2023 1154                         Point: Home exercise program (Done)       Learning Progress Summary             Patient Acceptance, E, VU by  at 7/28/2023 1148    Acceptance, E, VU by  at 7/27/2023 1154                         Point: Body mechanics (Done)       Learning Progress Summary             Patient Acceptance, E, VU by  at 7/28/2023 1148    Acceptance, E, VU by  at 7/27/2023 1154                         Point: Precautions (Done)       Learning Progress Summary             Patient Acceptance, E, VU by  at 7/28/2023 1148    Acceptance, E, VU by  at 7/27/2023 1154                                         User Key       Initials Effective Dates Name Provider Type Discipline     05/02/22 -  Dana Michel PT Physical Therapist PT                  PT Recommendation and Plan     Plan of Care Reviewed With: patient  Outcome Evaluation: Patient seen for PT session this AM. Patient supine in bed upon arrival. Patient sat up to EOB with SBA. Patient performed STS from EOB with CGA. Increased time needed to gain balance before ambulated 150ft 2x with CGA. Gait slow and shuffled with narrow SERGIO. Patient declined use of rwx for ambulation. Patient sitting in chair at end of session. Patient with planned procedure for Monday. Encouraged patient to continue ambulating with nsg and family.  PT will f/u post op.     Time Calculation:         PT Charges       Row Name 07/28/23 1149             Time Calculation    Start Time 1112  -SM      Stop Time 1122  -SM      Time Calculation (min) 10 min  -SM      PT Received On 07/28/23  -SM      PT - Next Appointment 07/31/23  -         Time Calculation- PT    Total Timed Code Minutes- PT 10 minute(s)  -SM         Timed Charges    61065 - PT Therapeutic Activity Minutes 10  -SM         Total Minutes    Timed Charges Total Minutes 10  -SM       Total Minutes 10  -SM                User Key  (r) = Recorded By, (t) = Taken By, (c) = Cosigned By      Initials Name Provider Type     Dana Michel, PT Physical Therapist                  Therapy Charges for Today       Code Description Service Date Service Provider Modifiers Qty    10524157934 HC PT EVAL LOW COMPLEXITY 3 7/27/2023 Dana Michel, PT GP 1    22246655159 HC PT THERAPEUTIC ACT EA 15 MIN 7/28/2023 Dana Michel, PT GP 1            PT G-Codes  Outcome Measure Options: AM-PAC 6 Clicks Basic Mobility (PT)  AM-PAC 6 Clicks Score (PT): 19  PT Discharge Summary  Anticipated Discharge Disposition (PT): home with assist, home with home health    Dana Michel PT  7/28/2023

## 2023-07-28 NOTE — NURSING NOTE
Patient is A&O x1-2, patient thinks it is 2021. No events overnight, VS are stable, will continue to monitor patient.

## 2023-07-28 NOTE — PLAN OF CARE
Problem: Fall Injury Risk  Goal: Absence of Fall and Fall-Related Injury  Outcome: Ongoing, Progressing  Intervention: Identify and Manage Contributors  Recent Flowsheet Documentation  Taken 7/27/2023 2110 by Sara Lawson RN  Medication Review/Management: medications reviewed  Intervention: Promote Injury-Free Environment  Recent Flowsheet Documentation  Taken 7/27/2023 2115 by Sara Lawson RN  Safety Promotion/Fall Prevention:   activity supervised   fall prevention program maintained   lighting adjusted   nonskid shoes/slippers when out of bed   room organization consistent   safety round/check completed   toileting scheduled  Taken 7/27/2023 2110 by Sara Lawson RN  Safety Promotion/Fall Prevention:   activity supervised   fall prevention program maintained   lighting adjusted   nonskid shoes/slippers when out of bed   room organization consistent   safety round/check completed   toileting scheduled  Goal: Absence of Fall and Fall-Related Injury  Outcome: Ongoing, Progressing  Intervention: Identify and Manage Contributors  Recent Flowsheet Documentation  Taken 7/27/2023 2110 by Sara Lawson RN  Medication Review/Management: medications reviewed  Intervention: Promote Injury-Free Environment  Recent Flowsheet Documentation  Taken 7/27/2023 2115 by Sara Lawson RN  Safety Promotion/Fall Prevention:   activity supervised   fall prevention program maintained   lighting adjusted   nonskid shoes/slippers when out of bed   room organization consistent   safety round/check completed   toileting scheduled  Taken 7/27/2023 2110 by Sara Lawson RN  Safety Promotion/Fall Prevention:   activity supervised   fall prevention program maintained   lighting adjusted   nonskid shoes/slippers when out of bed   room organization consistent   safety round/check completed   toileting scheduled     Problem: Adult Inpatient Plan of Care  Goal: Plan of Care Review  Outcome: Ongoing, Progressing  Goal: Patient-Specific  Goal (Individualized)  Outcome: Ongoing, Progressing  Goal: Absence of Hospital-Acquired Illness or Injury  Outcome: Ongoing, Progressing  Intervention: Identify and Manage Fall Risk  Recent Flowsheet Documentation  Taken 7/27/2023 2115 by Sara Lawson RN  Safety Promotion/Fall Prevention:   activity supervised   fall prevention program maintained   lighting adjusted   nonskid shoes/slippers when out of bed   room organization consistent   safety round/check completed   toileting scheduled  Taken 7/27/2023 2110 by Sara Lawson RN  Safety Promotion/Fall Prevention:   activity supervised   fall prevention program maintained   lighting adjusted   nonskid shoes/slippers when out of bed   room organization consistent   safety round/check completed   toileting scheduled  Intervention: Prevent Skin Injury  Recent Flowsheet Documentation  Taken 7/27/2023 2115 by Sara Lawson RN  Body Position: position changed independently  Taken 7/27/2023 2110 by Sara Lawson RN  Body Position: position changed independently  Intervention: Prevent and Manage VTE (Venous Thromboembolism) Risk  Recent Flowsheet Documentation  Taken 7/27/2023 2115 by Sara Lawson RN  Activity Management: activity encouraged  VTE Prevention/Management: (patient refused) sequential compression devices off  Range of Motion: active ROM (range of motion) encouraged  Taken 7/27/2023 2110 by Sara Lawson RN  Activity Management: activity encouraged  Intervention: Prevent Infection  Recent Flowsheet Documentation  Taken 7/27/2023 2115 by Sara Lawson RN  Infection Prevention:   hand hygiene promoted   rest/sleep promoted   single patient room provided  Taken 7/27/2023 2110 by Sara Lawson RN  Infection Prevention:   hand hygiene promoted   rest/sleep promoted   single patient room provided  Goal: Optimal Comfort and Wellbeing  Outcome: Ongoing, Progressing  Intervention: Provide Person-Centered Care  Recent Flowsheet Documentation  Taken  7/27/2023 2115 by Sara Lawson RN  Trust Relationship/Rapport:   care explained   emotional support provided   questions answered   thoughts/feelings acknowledged  Goal: Readiness for Transition of Care  Outcome: Ongoing, Progressing     Problem: Adjustment to Illness (Stroke, Ischemic/Transient Ischemic Attack)  Goal: Optimal Coping  Outcome: Ongoing, Progressing     Problem: Bowel Elimination Impaired (Stroke, Ischemic/Transient Ischemic Attack)  Goal: Effective Bowel Elimination  Outcome: Ongoing, Progressing     Problem: Cerebral Tissue Perfusion (Stroke, Ischemic/Transient Ischemic Attack)  Goal: Optimal Cerebral Tissue Perfusion  Outcome: Ongoing, Progressing     Problem: Cognitive Impairment (Stroke, Ischemic/Transient Ischemic Attack)  Goal: Optimal Cognitive Function  Outcome: Ongoing, Progressing     Problem: Communication Impairment (Stroke, Ischemic/Transient Ischemic Attack)  Goal: Improved Communication Skills  Outcome: Ongoing, Progressing     Problem: Functional Ability Impaired (Stroke, Ischemic/Transient Ischemic Attack)  Goal: Optimal Functional Ability  Outcome: Ongoing, Progressing  Intervention: Optimize Functional Ability  Recent Flowsheet Documentation  Taken 7/27/2023 2115 by Sara Lawson RN  Activity Management: activity encouraged  Taken 7/27/2023 2110 by Sara Lawson RN  Activity Management: activity encouraged     Problem: Respiratory Compromise (Stroke, Ischemic/Transient Ischemic Attack)  Goal: Effective Oxygenation and Ventilation  Outcome: Ongoing, Progressing  Intervention: Optimize Oxygenation and Ventilation  Recent Flowsheet Documentation  Taken 7/27/2023 2115 by Sara Lawson RN  Head of Bed (HOB) Positioning: HOB at 30 degrees  Taken 7/27/2023 2110 by Sara Lawson RN  Head of Bed (HOB) Positioning: HOB at 30 degrees     Problem: Sensorimotor Impairment (Stroke, Ischemic/Transient Ischemic Attack)  Goal: Improved Sensorimotor Function  Outcome: Ongoing,  Progressing  Intervention: Optimize Range of Motion, Motor Control and Function  Recent Flowsheet Documentation  Taken 7/27/2023 2115 by Sara Lawson, RN  Positioning/Transfer Devices:   pillows   in use  Range of Motion: active ROM (range of motion) encouraged  Taken 7/27/2023 2110 by Sara Lawson, RN  Positioning/Transfer Devices:   pillows   in use     Problem: Swallowing Impairment (Stroke, Ischemic/Transient Ischemic Attack)  Goal: Optimal Eating and Swallowing without Aspiration  Outcome: Ongoing, Progressing     Problem: Urinary Elimination Impaired (Stroke, Ischemic/Transient Ischemic Attack)  Goal: Effective Urinary Elimination  Outcome: Ongoing, Progressing   Goal Outcome Evaluation:

## 2023-07-28 NOTE — PLAN OF CARE
Goal Outcome Evaluation:        No change    Problem: Fall Injury Risk  Goal: Absence of Fall and Fall-Related Injury  Outcome: Ongoing, Progressing  Intervention: Identify and Manage Contributors  Recent Flowsheet Documentation  Taken 7/28/2023 1458 by Suzanne SNELL RN  Medication Review/Management: medications reviewed  Taken 7/28/2023 0825 by Suzanne SNELL RN  Medication Review/Management: medications reviewed  Intervention: Promote Injury-Free Environment  Recent Flowsheet Documentation  Taken 7/28/2023 1458 by Suzanne SNELL RN  Safety Promotion/Fall Prevention:   safety round/check completed   nonskid shoes/slippers when out of bed   lighting adjusted   fall prevention program maintained   clutter free environment maintained   assistive device/personal items within reach  Taken 7/28/2023 0825 by Suzanne SNELL RN  Safety Promotion/Fall Prevention:   safety round/check completed   nonskid shoes/slippers when out of bed   lighting adjusted   fall prevention program maintained   clutter free environment maintained   assistive device/personal items within reach  Goal: Absence of Fall and Fall-Related Injury  Outcome: Ongoing, Progressing  Intervention: Identify and Manage Contributors  Recent Flowsheet Documentation  Taken 7/28/2023 1458 by Suzanne SNELL RN  Medication Review/Management: medications reviewed  Taken 7/28/2023 0825 by Suzanne SNELL RN  Medication Review/Management: medications reviewed  Intervention: Promote Injury-Free Environment  Recent Flowsheet Documentation  Taken 7/28/2023 1458 by Suzanne SNELL RN  Safety Promotion/Fall Prevention:   safety round/check completed   nonskid shoes/slippers when out of bed   lighting adjusted   fall prevention program maintained   clutter free environment maintained   assistive device/personal items within reach  Taken 7/28/2023 0825 by ALIS  Suzanne Landa RN  Safety Promotion/Fall Prevention:   safety round/check completed   nonskid shoes/slippers when out of bed   lighting adjusted   fall prevention program maintained   clutter free environment maintained   assistive device/personal items within reach

## 2023-07-28 NOTE — PROGRESS NOTES
Name: Magan Lubin ADMIT: 2023   : 1935  PCP: Jo Ann Friend APRN    MRN: [id number] LOS: 1 days   AGE/SEX: 88 y.o. male  ROOM: [facility]     Subjective   Subjective   No new complaint. Family reports he is more confused than baseline       Objective   Objective   Vital Signs  Temp:  [97.7 øF (36.5 øC)-98.8 øF (37.1 øC)] 98 øF (36.7 øC)  Heart Rate:  [62-63] 62  Resp:  [16-18] 18  BP: (109-112)/(45-64) 112/45  SpO2:  [94 %-97 %] 97 %  on   ;   Device (Oxygen Therapy): room air  Body mass index is 24.41 kg/mý.  Physical Exam  Vitals reviewed.   Constitutional:       General: He is not in acute distress.     Appearance: He is not ill-appearing.   Cardiovascular:      Rate and Rhythm: Normal rate and regular rhythm.   Pulmonary:      Effort: No respiratory distress.      Breath sounds: Normal breath sounds.   Abdominal:      General: There is no distension.      Palpations: Abdomen is soft.      Tenderness: There is no abdominal tenderness.   Musculoskeletal:      Right lower leg: No edema.      Left lower leg: No edema.   Neurological:      Mental Status: He is alert. He is disoriented.   Psychiatric:         Mood and Affect: Mood normal.     Results Review     I reviewed the patient's new clinical results.  Results from last 7 days   Lab Units 23  0406 23  1503   WBC 10*3/mm3 6.45 6.65   HEMOGLOBIN g/dL 11.7* 12.9*   PLATELETS 10*3/mm3 211 218     Results from last 7 days   Lab Units 23  0406 23  1503   SODIUM mmol/L 139 139   POTASSIUM mmol/L 3.8 4.0   CHLORIDE mmol/L 106 104   CO2 mmol/L 25.0 24.8   BUN mg/dL 17 17   CREATININE mg/dL 0.86 0.87   GLUCOSE mg/dL 98 103*   EGFR mL/min/1.73 84.3 84.0     Results from last 7 days   Lab Units 23  0406 23  1503   ALBUMIN g/dL 3.8 4.4   BILIRUBIN mg/dL 0.4 0.6   ALK PHOS U/L 87 95   AST (SGOT) U/L 16 20   ALT (SGPT) U/L 17 23     Results from last 7 days   Lab Units 23  0406 23  1503   CALCIUM mg/dL 9.2 9.8    ALBUMIN g/dL 3.8 4.4       Hemoglobin A1C   Date/Time Value Ref Range Status   07/25/2023 1503 5.60 4.80 - 5.60 % Final     Glucose   Date/Time Value Ref Range Status   07/25/2023 1542 95 70 - 130 mg/dL Final     Comment:     Meter: [id number] : [id number] Josafat Guerrero       MRI Brain Without Contrast    Result Date: 7/25/2023  1. Scattered foci of restricted diffusion identified within the right cerebral hemisphere.  2. Degenerative changes of the cervical spine.  This report was finalized on 7/25/2023 11:42 PM by Dr. Rebeca Marshall M.D.      MRI Cervical Spine Without Contrast    Result Date: 7/25/2023  1. Scattered foci of restricted diffusion identified within the right cerebral hemisphere.  2. Degenerative changes of the cervical spine.  This report was finalized on 7/25/2023 11:42 PM by Dr. Rebeca Marshall M.D.     I have personally reviewed all medications:  Scheduled Medications  aspirin, 325 mg, Oral, Daily   Or  aspirin, 300 mg, Rectal, Daily  atorvastatin, 80 mg, Oral, Nightly  clopidogrel, 75 mg, Oral, Daily  docusate sodium, 100 mg, Oral, BID  mirtazapine, 7.5 mg, Oral, Nightly  [START ON 7/28/2023] pantoprazole, 40 mg, Oral, Q AM  sodium chloride, 10 mL, Intravenous, Q12H  terazosin, 2 mg, Oral, Nightly    Infusions   Diet  Diet: Regular/House Diet; Texture: Regular Texture (IDDSI 7); Fluid Consistency: Thin (IDDSI 0)    I have personally reviewed:  [x]  Laboratory   []  Microbiology   [x]  Radiology   [x]  EKG/Telemetry  [x]  Cardiology/Vascular   []  Pathology    []  Records       Assessment/Plan     Active Hospital Problems    Diagnosis  POA    **Ischemic stroke [I63.9]  Yes    Symptomatic carotid artery stenosis [I65.29]  Yes    Left upper extremity numbness [R20.0]  Yes      Resolved Hospital Problems   No resolved problems to display.       88 y.o. male admitted with Ischemic stroke.    Stroke due symptomatic right carotid stenosis. Awaiting carotid intervention which will be  done Monday per vascular. D/w Dr Kendrick. Cards clearance obtained  - cont ASA/Plavix  - Lipitor  - Echo with mild pulm HTN    F/u pulm nodule with outpatient CT scan. Requested any prior records of workup    SCDs for DVT prophylaxis.  Dispo: Home after surg. Vascular requested he stay in house until then      Adrian Becker MD  [facility]  07/27/23  22:22 EDT

## 2023-07-28 NOTE — PROGRESS NOTES
Name: Magan Lubin ADMIT: 2023   : 1935  PCP: Jo Ann Friend APRN    MRN: [id number] LOS: 2 days   AGE/SEX: 88 y.o. male  ROOM: [address]    [place], Subsequent Hospital Care    Chief Complaint   Patient presents with    Extremity Weakness     CC: Carotid stenosis follow-up.  Subjective     88 y.o. male no focal complaints this morning.    Review of Systems    Objective     Scheduled Medications:   aspirin, 325 mg, Oral, Daily   Or  aspirin, 300 mg, Rectal, Daily  atorvastatin, 80 mg, Oral, Nightly  clopidogrel, 75 mg, Oral, Daily  docusate sodium, 100 mg, Oral, BID  mirtazapine, 7.5 mg, Oral, Nightly  pantoprazole, 40 mg, Oral, Q AM  sodium chloride, 10 mL, Intravenous, Q12H  terazosin, 2 mg, Oral, Nightly        Active Infusions:       As Needed Medications:    ondansetron    sodium chloride    sodium chloride    sodium chloride    Vital Signs  Vital Signs Patient Vitals for the past 24 hrs:   BP Temp Temp src Pulse Resp SpO2   23 2310 113/67 97.3 øF (36.3 øC) Oral 55 18 94 %   23 1900 112/45 98 øF (36.7 øC) Oral -- 18 --   23 1302 109/58 97.7 øF (36.5 øC) Oral 62 18 97 %       I/O:  I/O last 3 completed shifts:  In: -   Out: 1200 [Urine:1200]    Physical Exam:  Physical Exam  Constitutional:       Appearance: He is well-developed.   Pulmonary:      Effort: Pulmonary effort is normal. No respiratory distress.   Abdominal:      General: There is no distension.      Palpations: Abdomen is soft.   Neurological:      Mental Status: He is alert and oriented to person, place, and time.        Results Review:     CBC    Results from last 7 days   Lab Units 23  0406 23  1503   WBC 10*3/mm3 6.45 6.65   HEMOGLOBIN g/dL 11.7* 12.9*   PLATELETS 10*3/mm3 211 218       BMP   Results from last 7 days   Lab Units 23  0406 23  1503   SODIUM mmol/L 139 139   POTASSIUM mmol/L 3.8 4.0   CHLORIDE mmol/L 106 104   CO2 mmol/L 25.0 24.8   BUN mg/dL 17 17    CREATININE mg/dL 0.86 0.87   GLUCOSE mg/dL 98 103*         Assessment & Plan     Assessment & Plan      Ischemic stroke    Left upper extremity numbness    Symptomatic carotid artery stenosis      88 y.o. male patient with symptomatic right carotid artery stenosis.  Appreciate cardiology's assistance and promptly evaluated the patient.  He appears to be clear for surgery.  He is on the schedule for Monday afternoon for right transcervical carotid artery stent placement.  Case discussed with patient, family yesterday.  Continue dual antiplatelet therapy and high intensity statin.      Alan Kendrick MD  07/28/23  08:05 EDT    Please call my office with any question: [phone]    Active Hospital Problems    Diagnosis  POA    **Ischemic stroke [I63.9]  Yes    Symptomatic carotid artery stenosis [I65.29]  Yes    Left upper extremity numbness [R20.0]  Yes      Resolved Hospital Problems   No resolved problems to display.

## 2023-07-29 LAB
ANION GAP SERPL CALCULATED.3IONS-SCNC: 12.3 MMOL/L (ref 5–15)
BUN SERPL-MCNC: 23 MG/DL (ref 8–23)
BUN/CREAT SERPL: 27.7 (ref 7–25)
CALCIUM SPEC-SCNC: 9 MG/DL (ref 8.6–10.5)
CHLORIDE SERPL-SCNC: 104 MMOL/L (ref 98–107)
CO2 SERPL-SCNC: 21.7 MMOL/L (ref 22–29)
CREAT SERPL-MCNC: 0.83 MG/DL (ref 0.76–1.27)
DEPRECATED RDW RBC AUTO: 43.9 FL (ref 37–54)
EGFRCR SERPLBLD CKD-EPI 2021: 85.2 ML/MIN/1.73
ERYTHROCYTE [DISTWIDTH] IN BLOOD BY AUTOMATED COUNT: 12 % (ref 12.3–15.4)
GLUCOSE SERPL-MCNC: 90 MG/DL (ref 65–99)
HCT VFR BLD AUTO: 36.4 % (ref 37.5–51)
HGB BLD-MCNC: 12.4 G/DL (ref 13–17.7)
MCH RBC QN AUTO: 33.7 PG (ref 26.6–33)
MCHC RBC AUTO-ENTMCNC: 34.1 G/DL (ref 31.5–35.7)
MCV RBC AUTO: 98.9 FL (ref 79–97)
PLATELET # BLD AUTO: 218 10*3/MM3 (ref 140–450)
PMV BLD AUTO: 9.6 FL (ref 6–12)
POTASSIUM SERPL-SCNC: 4 MMOL/L (ref 3.5–5.2)
RBC # BLD AUTO: 3.68 10*6/MM3 (ref 4.14–5.8)
SODIUM SERPL-SCNC: 138 MMOL/L (ref 136–145)
WBC NRBC COR # BLD: 5.94 10*3/MM3 (ref 3.4–10.8)

## 2023-07-29 PROCEDURE — 85027 COMPLETE CBC AUTOMATED: CPT | Performed by: NURSE PRACTITIONER

## 2023-07-29 PROCEDURE — 80048 BASIC METABOLIC PNL TOTAL CA: CPT | Performed by: NURSE PRACTITIONER

## 2023-07-29 RX ADMIN — TERAZOSIN 2 MG: 2 CAPSULE ORAL at 20:46

## 2023-07-29 RX ADMIN — Medication 10 ML: at 20:46

## 2023-07-29 RX ADMIN — ATORVASTATIN CALCIUM 80 MG: 80 TABLET, FILM COATED ORAL at 20:46

## 2023-07-29 RX ADMIN — DOCUSATE SODIUM 100 MG: 100 CAPSULE, LIQUID FILLED ORAL at 07:49

## 2023-07-29 RX ADMIN — ASPIRIN 325 MG: 325 TABLET ORAL at 07:49

## 2023-07-29 RX ADMIN — PANTOPRAZOLE SODIUM 40 MG: 40 TABLET, DELAYED RELEASE ORAL at 07:02

## 2023-07-29 RX ADMIN — CLOPIDOGREL BISULFATE 75 MG: 75 TABLET, FILM COATED ORAL at 07:49

## 2023-07-29 RX ADMIN — MIRTAZAPINE 7.5 MG: 15 TABLET, FILM COATED ORAL at 20:46

## 2023-07-29 NOTE — PLAN OF CARE
Goal Outcome Evaluation:         Vitals stable. Patient pleasantly confused throughout shift. Up to chair. Walked around nursing unit with family and walker today. +BM. CT chest pending.

## 2023-07-29 NOTE — PROGRESS NOTES
Name: Magan Lubin ADMIT: 2023   : 1935  PCP: Jo Ann Friend APRN    MRN: [id number] LOS: 3 days   AGE/SEX: 88 y.o. male  ROOM: [facility]     Subjective   Subjective   Feeling fine. Neuro symptoms have resolved. Tolerating diet. Voiding fine.   No SOA or CP or palp. No N/V/D/abd pain. No F/C/NS.       Objective   Objective   Vital Signs  Temp:  [97.5 øF (36.4 øC)-98.2 øF (36.8 øC)] 97.9 øF (36.6 øC)  Heart Rate:  [54-62] 61  Resp:  [16-18] 18  BP: (111-137)/(54-72) 137/72  SpO2:  [93 %-95 %] 94 %  on   ;   Device (Oxygen Therapy): room air  Body mass index is 24.41 kg/mý.  Physical Exam  Vitals and nursing note reviewed. Exam conducted with a chaperone present (Wife).   Constitutional:       General: He is not in acute distress.     Appearance: He is ill-appearing (chronically). He is not toxic-appearing or diaphoretic.   HENT:      Head: Normocephalic.      Nose: Nose normal.      Mouth/Throat:      Mouth: Mucous membranes are moist.      Pharynx: Oropharynx is clear.   Eyes:      General: No scleral icterus.        Right eye: No discharge.         Left eye: No discharge.      Extraocular Movements: Extraocular movements intact.      Conjunctiva/sclera: Conjunctivae normal.   Cardiovascular:      Rate and Rhythm: Normal rate and regular rhythm.      Pulses: Normal pulses.   Pulmonary:      Effort: Pulmonary effort is normal. No respiratory distress.      Breath sounds: Normal breath sounds. No wheezing or rales.   Abdominal:      General: Bowel sounds are normal. There is no distension.      Palpations: Abdomen is soft.      Tenderness: There is no abdominal tenderness.   Musculoskeletal:         General: Swelling (trace in BLEs) present. Normal range of motion.      Cervical back: Neck supple. No rigidity.   Lymphadenopathy:      Cervical: No cervical adenopathy.   Skin:     General: Skin is warm and dry.      Capillary Refill: Capillary refill takes less than 2 seconds.      Coloration: Skin is  not jaundiced.   Neurological:      General: No focal deficit present.      Mental Status: He is alert and oriented to person, place, and time. Mental status is at baseline.      Cranial Nerves: No cranial nerve deficit.      Coordination: Coordination normal.   Psychiatric:         Mood and Affect: Mood normal.         Behavior: Behavior normal.         Thought Content: Thought content normal.     Results Review     I reviewed the patient's new clinical results.  Results from last 7 days   Lab Units 07/29/23  0439 07/26/23  0406 07/25/23  1503   WBC 10*3/mm3 5.94 6.45 6.65   HEMOGLOBIN g/dL 12.4* 11.7* 12.9*   PLATELETS 10*3/mm3 218 211 218     Results from last 7 days   Lab Units 07/29/23  0439 07/26/23  0406 07/25/23  1503   SODIUM mmol/L 138 139 139   POTASSIUM mmol/L 4.0 3.8 4.0   CHLORIDE mmol/L 104 106 104   CO2 mmol/L 21.7* 25.0 24.8   BUN mg/dL 23 17 17   CREATININE mg/dL 0.83 0.86 0.87   GLUCOSE mg/dL 90 98 103*   EGFR mL/min/1.73 85.2 84.3 84.0     Results from last 7 days   Lab Units 07/26/23  0406 07/25/23  1503   ALBUMIN g/dL 3.8 4.4   BILIRUBIN mg/dL 0.4 0.6   ALK PHOS U/L 87 95   AST (SGOT) U/L 16 20   ALT (SGPT) U/L 17 23     Results from last 7 days   Lab Units 07/29/23  0439 07/26/23  0406 07/25/23  1503   CALCIUM mg/dL 9.0 9.2 9.8   ALBUMIN g/dL  --  3.8 4.4       No results found for: HGBA1C, POCGLU    No radiology results for the last day    I have personally reviewed all medications:  Scheduled Medications  aspirin, 325 mg, Oral, Daily   Or  aspirin, 300 mg, Rectal, Daily  atorvastatin, 80 mg, Oral, Nightly  clopidogrel, 75 mg, Oral, Daily  docusate sodium, 100 mg, Oral, BID  mirtazapine, 7.5 mg, Oral, Nightly  pantoprazole, 40 mg, Oral, Q AM  sodium chloride, 10 mL, Intravenous, Q12H  terazosin, 2 mg, Oral, Nightly    Infusions   Diet  Diet: Regular/House Diet; Texture: Regular Texture (IDDSI 7); Fluid Consistency: Thin (IDDSI 0)    I have personally reviewed:  [x]  Laboratory   []   Microbiology   [x]  Radiology   [x]  EKG/Telemetry  [x]  Cardiology/Vascular   []  Pathology    [x]  Records       Assessment/Plan     Active Hospital Problems    Diagnosis  POA    **Ischemic stroke [I63.9]  Yes    Left upper lobe pulmonary nodule [R91.1]  Yes    Symptomatic carotid artery stenosis [I65.29]  Yes    Left upper extremity numbness [R20.0]  Yes    Benign prostatic hyperplasia without lower urinary tract symptoms [N40.0]  Yes    History of prostate cancer [Z85.46]  Not Applicable      Resolved Hospital Problems   No resolved problems to display.       88-year-old gentleman that presented to the hospital with dizziness, left upper extremity numbness, and a fall at home. He was initially placed in the observation unit where MRI brain showed scattered embolic infarcts in the right cerebral hemisphere. CTA head & neck revealed bilateral carotid stenosis. Neurology felt that the right carotid stenosis was symptomatic leading to the stroke and consulted Vascular Surg for further eval/recs.  Cardiology was consulted for preoperative clearance and [facility] was consulted to admit pt from the observation unit.     Acute right sided embolic CVAs  Symptomatic right carotid artery stenosis  -Neurology evaluated. Recommends  mg daily and Plavix 75 mg daily for at least 3 months.  -High intensity statin.  -Vascular Surg consulted and plans right transcervical carotid artery stent placement on Monday 7/31.   -Vascular Surg recommends inpatient stay until this occurs.  -Cardiology consulted for preoperative clearance which was obtained.  -Continue to monitor on Neuro-monitored floor (5N)     BPH  History of prostate cancer  -Monitor for any urinary retention.  -Continue alpha-blocker.    Left upper lobe lung nodule  -Seen on CT C-spine  -Also noted to have RML nodule on CXR about a year ago that was never followed up that I can see  -Will check CT Chest w/wo contrast  -58 pack/year smoking history, quit 2013      SCDs  for DVT prophylaxis.  Full code.  Discussed with patient and wife.  Anticipate discharge  TBD        Bennett Conner MD  [facility]  07/29/23  15:37 EDT

## 2023-07-30 ENCOUNTER — APPOINTMENT (OUTPATIENT)
Dept: CT IMAGING | Facility: HOSPITAL | Age: 88
DRG: 035 | End: 2023-07-30
Payer: MEDICARE

## 2023-07-30 LAB
ALBUMIN SERPL-MCNC: 4.2 G/DL (ref 3.5–5.2)
ALBUMIN/GLOB SERPL: 1.8 G/DL
ALP SERPL-CCNC: 111 U/L (ref 39–117)
ALT SERPL W P-5'-P-CCNC: 21 U/L (ref 1–41)
ANION GAP SERPL CALCULATED.3IONS-SCNC: 11 MMOL/L (ref 5–15)
AST SERPL-CCNC: 23 U/L (ref 1–40)
BILIRUB SERPL-MCNC: 0.4 MG/DL (ref 0–1.2)
BUN SERPL-MCNC: 20 MG/DL (ref 8–23)
BUN/CREAT SERPL: 25.6 (ref 7–25)
CALCIUM SPEC-SCNC: 9.5 MG/DL (ref 8.6–10.5)
CHLORIDE SERPL-SCNC: 105 MMOL/L (ref 98–107)
CO2 SERPL-SCNC: 23 MMOL/L (ref 22–29)
CREAT SERPL-MCNC: 0.78 MG/DL (ref 0.76–1.27)
DEPRECATED RDW RBC AUTO: 44.8 FL (ref 37–54)
EGFRCR SERPLBLD CKD-EPI 2021: 86.9 ML/MIN/1.73
ERYTHROCYTE [DISTWIDTH] IN BLOOD BY AUTOMATED COUNT: 12.1 % (ref 12.3–15.4)
GLOBULIN UR ELPH-MCNC: 2.4 GM/DL
GLUCOSE SERPL-MCNC: 106 MG/DL (ref 65–99)
HCT VFR BLD AUTO: 38.1 % (ref 37.5–51)
HGB BLD-MCNC: 13 G/DL (ref 13–17.7)
MAGNESIUM SERPL-MCNC: 2.2 MG/DL (ref 1.6–2.4)
MCH RBC QN AUTO: 34.1 PG (ref 26.6–33)
MCHC RBC AUTO-ENTMCNC: 34.1 G/DL (ref 31.5–35.7)
MCV RBC AUTO: 100 FL (ref 79–97)
PLATELET # BLD AUTO: 222 10*3/MM3 (ref 140–450)
PMV BLD AUTO: 9.6 FL (ref 6–12)
POTASSIUM SERPL-SCNC: 3.8 MMOL/L (ref 3.5–5.2)
PROT SERPL-MCNC: 6.6 G/DL (ref 6–8.5)
RBC # BLD AUTO: 3.81 10*6/MM3 (ref 4.14–5.8)
SODIUM SERPL-SCNC: 139 MMOL/L (ref 136–145)
WBC NRBC COR # BLD: 6.41 10*3/MM3 (ref 3.4–10.8)

## 2023-07-30 PROCEDURE — 25510000001 IOPAMIDOL 61 % SOLUTION: Performed by: HOSPITALIST

## 2023-07-30 PROCEDURE — 83735 ASSAY OF MAGNESIUM: CPT | Performed by: HOSPITALIST

## 2023-07-30 PROCEDURE — 71260 CT THORAX DX C+: CPT

## 2023-07-30 PROCEDURE — 80053 COMPREHEN METABOLIC PANEL: CPT | Performed by: HOSPITALIST

## 2023-07-30 PROCEDURE — 85027 COMPLETE CBC AUTOMATED: CPT | Performed by: HOSPITALIST

## 2023-07-30 RX ADMIN — Medication 10 ML: at 08:27

## 2023-07-30 RX ADMIN — ATORVASTATIN CALCIUM 80 MG: 80 TABLET, FILM COATED ORAL at 22:11

## 2023-07-30 RX ADMIN — PANTOPRAZOLE SODIUM 40 MG: 40 TABLET, DELAYED RELEASE ORAL at 05:39

## 2023-07-30 RX ADMIN — TERAZOSIN 2 MG: 2 CAPSULE ORAL at 22:12

## 2023-07-30 RX ADMIN — ASPIRIN 325 MG: 325 TABLET ORAL at 08:27

## 2023-07-30 RX ADMIN — IOPAMIDOL 75 ML: 612 INJECTION, SOLUTION INTRAVENOUS at 16:10

## 2023-07-30 RX ADMIN — Medication 10 ML: at 22:13

## 2023-07-30 RX ADMIN — CLOPIDOGREL BISULFATE 75 MG: 75 TABLET, FILM COATED ORAL at 08:27

## 2023-07-30 NOTE — PROGRESS NOTES
Name: Magan Lubin ADMIT: 2023   : 1935  PCP: Jo Ann Friend APRN    MRN: [id number] LOS: 4 days   AGE/SEX: 88 y.o. male  ROOM: [facility]     Subjective   Subjective   Feeling fine again today. Neuro symptoms have resolved. Tolerating diet. Voiding fine.   No SOA or CP or palp. No N/V/D/abd pain. No F/C/NS.  Having some confusion at night       Objective   Objective   Vital Signs  Temp:  [97.5 øF (36.4 øC)-98.4 øF (36.9 øC)] 97.7 øF (36.5 øC)  Heart Rate:  [58-76] 73  Resp:  [18] 18  BP: (133-139)/(67-73) 138/73  SpO2:  [95 %-97 %] 95 %  on   ;   Device (Oxygen Therapy): room air  Body mass index is 22.81 kg/mý.    (No change in exam today)    Physical Exam  Vitals and nursing note reviewed. Exam conducted with a chaperone present (Dtr and granddtr).   Constitutional:       General: He is not in acute distress.     Appearance: He is ill-appearing (chronically). He is not toxic-appearing or diaphoretic.   HENT:      Head: Normocephalic.      Nose: Nose normal.      Mouth/Throat:      Mouth: Mucous membranes are moist.      Pharynx: Oropharynx is clear.   Eyes:      General: No scleral icterus.        Right eye: No discharge.         Left eye: No discharge.      Extraocular Movements: Extraocular movements intact.      Conjunctiva/sclera: Conjunctivae normal.   Cardiovascular:      Rate and Rhythm: Normal rate and regular rhythm.      Pulses: Normal pulses.   Pulmonary:      Effort: Pulmonary effort is normal. No respiratory distress.      Breath sounds: Normal breath sounds. No wheezing or rales.   Abdominal:      General: Bowel sounds are normal. There is no distension.      Palpations: Abdomen is soft.      Tenderness: There is no abdominal tenderness.   Musculoskeletal:         General: Swelling (trace in BLEs) present. Normal range of motion.      Cervical back: Neck supple. No rigidity.   Lymphadenopathy:      Cervical: No cervical adenopathy.   Skin:     General: Skin is warm and dry.       Capillary Refill: Capillary refill takes less than 2 seconds.      Coloration: Skin is not jaundiced.   Neurological:      General: No focal deficit present.      Mental Status: He is alert. Mental status is at baseline.      Cranial Nerves: No cranial nerve deficit.      Coordination: Coordination normal.      Comments: Oriented to person and year, knows he is in hospital but not which hospital   Psychiatric:         Mood and Affect: Mood normal.         Behavior: Behavior normal.         Thought Content: Thought content normal.     Results Review     I reviewed the patient's new clinical results.  Results from last 7 days   Lab Units 07/30/23  0501 07/29/23 0439 07/26/23  0406 07/25/23  1503   WBC 10*3/mm3 6.41 5.94 6.45 6.65   HEMOGLOBIN g/dL 13.0 12.4* 11.7* 12.9*   PLATELETS 10*3/mm3 222 218 211 218       Results from last 7 days   Lab Units 07/30/23  0501 07/29/23 0439 07/26/23  0406 07/25/23  1503   SODIUM mmol/L 139 138 139 139   POTASSIUM mmol/L 3.8 4.0 3.8 4.0   CHLORIDE mmol/L 105 104 106 104   CO2 mmol/L 23.0 21.7* 25.0 24.8   BUN mg/dL 20 23 17 17   CREATININE mg/dL 0.78 0.83 0.86 0.87   GLUCOSE mg/dL 106* 90 98 103*   EGFR mL/min/1.73 86.9 85.2 84.3 84.0       Results from last 7 days   Lab Units 07/30/23  0501 07/26/23 0406 07/25/23  1503   ALBUMIN g/dL 4.2 3.8 4.4   BILIRUBIN mg/dL 0.4 0.4 0.6   ALK PHOS U/L 111 87 95   AST (SGOT) U/L 23 16 20   ALT (SGPT) U/L 21 17 23       Results from last 7 days   Lab Units 07/30/23  0501 07/29/23 0439 07/26/23  0406 07/25/23  1503   CALCIUM mg/dL 9.5 9.0 9.2 9.8   ALBUMIN g/dL 4.2  --  3.8 4.4   MAGNESIUM mg/dL 2.2  --   --   --          No results found for: HGBA1C, POCGLU    No radiology results for the last day    I have personally reviewed all medications:  Scheduled Medications  aspirin, 325 mg, Oral, Daily   Or  aspirin, 300 mg, Rectal, Daily  atorvastatin, 80 mg, Oral, Nightly  clopidogrel, 75 mg, Oral, Daily  docusate sodium, 100 mg, Oral,  BID  mirtazapine, 7.5 mg, Oral, Nightly  pantoprazole, 40 mg, Oral, Q AM  sodium chloride, 10 mL, Intravenous, Q12H  terazosin, 2 mg, Oral, Nightly    Infusions   Diet  Diet: Regular/House Diet; Texture: Regular Texture (IDDSI 7); Fluid Consistency: Thin (IDDSI 0)  NPO Diet NPO Type: Strict NPO    I have personally reviewed:  [x]  Laboratory   []  Microbiology   []  Radiology   []  EKG/Telemetry  []  Cardiology/Vascular   []  Pathology    []  Records       Assessment/Plan     Active Hospital Problems    Diagnosis  POA    **Ischemic stroke [I63.9]  Yes    Left upper lobe pulmonary nodule [R91.1]  Yes    Symptomatic carotid artery stenosis [I65.29]  Yes    Left upper extremity numbness [R20.0]  Yes    Benign prostatic hyperplasia without lower urinary tract symptoms [N40.0]  Yes    History of prostate cancer [Z85.46]  Not Applicable      Resolved Hospital Problems   No resolved problems to display.       88-year-old gentleman that presented to the hospital with dizziness, left upper extremity numbness, and a fall at home. He was initially placed in the observation unit where MRI brain showed scattered embolic infarcts in the right cerebral hemisphere. CTA head & neck revealed bilateral carotid stenosis. Neurology felt that the right carotid stenosis was symptomatic leading to the stroke and consulted Vascular Surg for further eval/recs.  Cardiology was consulted for preoperative clearance and [facility] was consulted to admit pt from the observation unit.     Acute right sided embolic CVAs  Symptomatic right carotid artery stenosis  -Neurology input appreciated. Recommend  mg daily and Plavix 75 mg daily for at least 3 months.  -High intensity statin.  -Vascular Surg consulted and plans right transcervical carotid artery stent placement on Monday 7/31.   -Vascular Surg recommended inpatient stay until this occurs.  -Cardiology consulted for preoperative clearance which was obtained.  -Continue to monitor on  Neuro-monitored floor (5N)     BPH  History of prostate cancer  -Monitor for any urinary retention.  -Continue alpha-blocker.    Left upper lobe lung nodule  -Seen on CT C-spine  -Also noted to have RML nodule on CXR about a year ago that was never followed up that I can see  -Have ordered CT Chest w/ contrast  -58 pack/year smoking history, quit 2013 Sundowning  -Family thinks he is doing a little better today but he has been confused at night  -Meds reviewed  -Continue delirium precautions      SCDs for DVT prophylaxis.  Full code.  Discussed with patient, family x 2.  Anticipate discharge  TBD        Bennett Conner MD  [facility]  07/30/23  14:52 EDT

## 2023-07-30 NOTE — PLAN OF CARE
Goal Outcome Evaluation:  Plan of Care Reviewed With: patient        Progress: no change  Outcome Evaluation: Patients vitals stable. Patient denies pain. Patient very confused this shift and has to be redirected several times. Patient continues to get out of bed reporting he is going home and he is looking for his clothes. Patients NIH this shift is 1. Patient did not sleep much this shift and had 3 bm's this shift. Will continue to monitor.

## 2023-07-30 NOTE — PLAN OF CARE
Goal Outcome Evaluation:      Vitals stable. No c/o pain. Patient remains confused but easily redirectable. Plan for surgery tomorrow. Transfer orders to 5E noted, attempted to give report, awaiting callback

## 2023-07-31 ENCOUNTER — ANESTHESIA (OUTPATIENT)
Dept: PERIOP | Facility: HOSPITAL | Age: 88
DRG: 035 | End: 2023-07-31
Payer: MEDICARE

## 2023-07-31 ENCOUNTER — ANESTHESIA EVENT (OUTPATIENT)
Dept: PERIOP | Facility: HOSPITAL | Age: 88
DRG: 035 | End: 2023-07-31
Payer: MEDICARE

## 2023-07-31 ENCOUNTER — APPOINTMENT (OUTPATIENT)
Dept: GENERAL RADIOLOGY | Facility: HOSPITAL | Age: 88
DRG: 035 | End: 2023-07-31
Payer: MEDICARE

## 2023-07-31 LAB
ABO GROUP BLD: NORMAL
ALBUMIN SERPL-MCNC: 3.8 G/DL (ref 3.5–5.2)
ALBUMIN/GLOB SERPL: 1.7 G/DL
ALP SERPL-CCNC: 108 U/L (ref 39–117)
ALT SERPL W P-5'-P-CCNC: 23 U/L (ref 1–41)
ANION GAP SERPL CALCULATED.3IONS-SCNC: 10.6 MMOL/L (ref 5–15)
AST SERPL-CCNC: 19 U/L (ref 1–40)
BILIRUB SERPL-MCNC: 0.4 MG/DL (ref 0–1.2)
BLD GP AB SCN SERPL QL: NEGATIVE
BUN SERPL-MCNC: 16 MG/DL (ref 8–23)
BUN/CREAT SERPL: 20 (ref 7–25)
CALCIUM SPEC-SCNC: 9.2 MG/DL (ref 8.6–10.5)
CHLORIDE SERPL-SCNC: 106 MMOL/L (ref 98–107)
CO2 SERPL-SCNC: 22.4 MMOL/L (ref 22–29)
CREAT SERPL-MCNC: 0.8 MG/DL (ref 0.76–1.27)
DEPRECATED RDW RBC AUTO: 44.5 FL (ref 37–54)
EGFRCR SERPLBLD CKD-EPI 2021: 86.2 ML/MIN/1.73
ERYTHROCYTE [DISTWIDTH] IN BLOOD BY AUTOMATED COUNT: 12.2 % (ref 12.3–15.4)
GLOBULIN UR ELPH-MCNC: 2.2 GM/DL
GLUCOSE BLDC GLUCOMTR-MCNC: 109 MG/DL (ref 70–130)
GLUCOSE SERPL-MCNC: 99 MG/DL (ref 65–99)
HCT VFR BLD AUTO: 35.8 % (ref 37.5–51)
HGB BLD-MCNC: 12.2 G/DL (ref 13–17.7)
MAGNESIUM SERPL-MCNC: 2.2 MG/DL (ref 1.6–2.4)
MCH RBC QN AUTO: 33.8 PG (ref 26.6–33)
MCHC RBC AUTO-ENTMCNC: 34.1 G/DL (ref 31.5–35.7)
MCV RBC AUTO: 99.2 FL (ref 79–97)
PLATELET # BLD AUTO: 206 10*3/MM3 (ref 140–450)
PMV BLD AUTO: 9.2 FL (ref 6–12)
POTASSIUM SERPL-SCNC: 3.8 MMOL/L (ref 3.5–5.2)
PROT SERPL-MCNC: 6 G/DL (ref 6–8.5)
RBC # BLD AUTO: 3.61 10*6/MM3 (ref 4.14–5.8)
RH BLD: POSITIVE
SODIUM SERPL-SCNC: 139 MMOL/L (ref 136–145)
T&S EXPIRATION DATE: NORMAL
TSH SERPL DL<=0.05 MIU/L-ACNC: 6.86 UIU/ML (ref 0.27–4.2)
WBC NRBC COR # BLD: 4.88 10*3/MM3 (ref 3.4–10.8)

## 2023-07-31 PROCEDURE — C1725 CATH, TRANSLUMIN NON-LASER: HCPCS | Performed by: SURGERY

## 2023-07-31 PROCEDURE — 84443 ASSAY THYROID STIM HORMONE: CPT | Performed by: HOSPITALIST

## 2023-07-31 PROCEDURE — 25010000002 PROTAMINE SULFATE PER 10 MG: Performed by: ANESTHESIOLOGY

## 2023-07-31 PROCEDURE — 85027 COMPLETE CBC AUTOMATED: CPT | Performed by: HOSPITALIST

## 2023-07-31 PROCEDURE — 80053 COMPREHEN METABOLIC PANEL: CPT | Performed by: HOSPITALIST

## 2023-07-31 PROCEDURE — C1894 INTRO/SHEATH, NON-LASER: HCPCS | Performed by: SURGERY

## 2023-07-31 PROCEDURE — 25010000002 HEPARIN (PORCINE) PER 1000 UNITS: Performed by: SURGERY

## 2023-07-31 PROCEDURE — 25010000002 PROPOFOL 10 MG/ML EMULSION: Performed by: NURSE ANESTHETIST, CERTIFIED REGISTERED

## 2023-07-31 PROCEDURE — C1769 GUIDE WIRE: HCPCS | Performed by: SURGERY

## 2023-07-31 PROCEDURE — 25010000002 CEFAZOLIN IN DEXTROSE 2-4 GM/100ML-% SOLUTION: Performed by: SURGERY

## 2023-07-31 PROCEDURE — 25010000002 FENTANYL CITRATE (PF) 50 MCG/ML SOLUTION: Performed by: ANESTHESIOLOGY

## 2023-07-31 PROCEDURE — 86900 BLOOD TYPING SEROLOGIC ABO: CPT | Performed by: ANESTHESIOLOGY

## 2023-07-31 PROCEDURE — 25010000002 FENTANYL CITRATE (PF) 50 MCG/ML SOLUTION: Performed by: NURSE ANESTHETIST, CERTIFIED REGISTERED

## 2023-07-31 PROCEDURE — X2AH336 CEREBRAL EMBOLIC FILTRATION, EXTRACORPOREAL FLOW REVERSAL CIRCUIT FROM RIGHT COMMON CAROTID ARTERY, PERCUTANEOUS APPROACH, NEW TECHNOLOGY GROUP 6: ICD-10-PCS | Performed by: SURGERY

## 2023-07-31 PROCEDURE — C1876 STENT, NON-COA/NON-COV W/DEL: HCPCS | Performed by: SURGERY

## 2023-07-31 PROCEDURE — 25010000002 SUGAMMADEX 200 MG/2ML SOLUTION: Performed by: ANESTHESIOLOGY

## 2023-07-31 PROCEDURE — 83735 ASSAY OF MAGNESIUM: CPT | Performed by: HOSPITALIST

## 2023-07-31 PROCEDURE — 25510000001 IOPAMIDOL PER 1 ML: Performed by: HOSPITALIST

## 2023-07-31 PROCEDURE — 82803 BLOOD GASES ANY COMBINATION: CPT

## 2023-07-31 PROCEDURE — 86901 BLOOD TYPING SEROLOGIC RH(D): CPT | Performed by: ANESTHESIOLOGY

## 2023-07-31 PROCEDURE — 25010000002 HEPARIN (PORCINE) PER 1000 UNITS: Performed by: ANESTHESIOLOGY

## 2023-07-31 PROCEDURE — 86850 RBC ANTIBODY SCREEN: CPT | Performed by: ANESTHESIOLOGY

## 2023-07-31 PROCEDURE — 82947 ASSAY GLUCOSE BLOOD QUANT: CPT

## 2023-07-31 PROCEDURE — 82948 REAGENT STRIP/BLOOD GLUCOSE: CPT

## 2023-07-31 PROCEDURE — 85014 HEMATOCRIT: CPT

## 2023-07-31 PROCEDURE — 25010000002 MIDAZOLAM PER 1 MG: Performed by: ANESTHESIOLOGY

## 2023-07-31 PROCEDURE — 25010000002 BUPIVACAINE (PF) 0.25 % SOLUTION: Performed by: SURGERY

## 2023-07-31 PROCEDURE — C1884 EMBOLIZATION PROTECT SYST: HCPCS | Performed by: SURGERY

## 2023-07-31 PROCEDURE — 85018 HEMOGLOBIN: CPT

## 2023-07-31 PROCEDURE — 85347 COAGULATION TIME ACTIVATED: CPT

## 2023-07-31 PROCEDURE — 037K3DZ DILATION OF RIGHT INTERNAL CAROTID ARTERY WITH INTRALUMINAL DEVICE, PERCUTANEOUS APPROACH: ICD-10-PCS | Performed by: SURGERY

## 2023-07-31 PROCEDURE — 25010000002 PHENYLEPHRINE 10 MG/ML SOLUTION 5 ML VIAL: Performed by: ANESTHESIOLOGY

## 2023-07-31 DEVICE — FLOSEAL WITH RECOTHROM - 5ML
Type: IMPLANTABLE DEVICE | Site: NECK | Status: FUNCTIONAL
Brand: FLOSEAL HEMOSTATIC MATRIX

## 2023-07-31 DEVICE — 9 MM X 40 MM
Type: IMPLANTABLE DEVICE | Site: CAROTID | Status: FUNCTIONAL
Brand: ENROUTE TRANSCAROTID STENT

## 2023-07-31 RX ORDER — FAMOTIDINE 10 MG/ML
20 INJECTION, SOLUTION INTRAVENOUS ONCE
Status: COMPLETED | OUTPATIENT
Start: 2023-07-31 | End: 2023-07-31

## 2023-07-31 RX ORDER — NITROGLYCERIN 0.4 MG/1
0.4 TABLET SUBLINGUAL
Status: DISCONTINUED | OUTPATIENT
Start: 2023-07-31 | End: 2023-08-01 | Stop reason: HOSPADM

## 2023-07-31 RX ORDER — FENTANYL CITRATE 50 UG/ML
50 INJECTION, SOLUTION INTRAMUSCULAR; INTRAVENOUS ONCE AS NEEDED
Status: COMPLETED | OUTPATIENT
Start: 2023-07-31 | End: 2023-07-31

## 2023-07-31 RX ORDER — BUPIVACAINE HYDROCHLORIDE 2.5 MG/ML
INJECTION, SOLUTION EPIDURAL; INFILTRATION; INTRACAUDAL AS NEEDED
Status: DISCONTINUED | OUTPATIENT
Start: 2023-07-31 | End: 2023-07-31 | Stop reason: HOSPADM

## 2023-07-31 RX ORDER — DROPERIDOL 2.5 MG/ML
0.62 INJECTION, SOLUTION INTRAMUSCULAR; INTRAVENOUS
Status: DISCONTINUED | OUTPATIENT
Start: 2023-07-31 | End: 2023-07-31 | Stop reason: HOSPADM

## 2023-07-31 RX ORDER — FLUMAZENIL 0.1 MG/ML
0.2 INJECTION INTRAVENOUS AS NEEDED
Status: DISCONTINUED | OUTPATIENT
Start: 2023-07-31 | End: 2023-07-31 | Stop reason: HOSPADM

## 2023-07-31 RX ORDER — HYDROCODONE BITARTRATE AND ACETAMINOPHEN 7.5; 325 MG/1; MG/1
1 TABLET ORAL EVERY 4 HOURS PRN
Status: DISCONTINUED | OUTPATIENT
Start: 2023-07-31 | End: 2023-07-31 | Stop reason: HOSPADM

## 2023-07-31 RX ORDER — ONDANSETRON 2 MG/ML
4 INJECTION INTRAMUSCULAR; INTRAVENOUS EVERY 6 HOURS PRN
Status: DISCONTINUED | OUTPATIENT
Start: 2023-07-31 | End: 2023-08-01 | Stop reason: HOSPADM

## 2023-07-31 RX ORDER — PHENYLEPHRINE HCL IN 0.9% NACL 0.5 MG/5ML
SYRINGE (ML) INTRAVENOUS AS NEEDED
Status: DISCONTINUED | OUTPATIENT
Start: 2023-07-31 | End: 2023-07-31 | Stop reason: SURG

## 2023-07-31 RX ORDER — HEPARIN SODIUM 1000 [USP'U]/ML
INJECTION, SOLUTION INTRAVENOUS; SUBCUTANEOUS AS NEEDED
Status: DISCONTINUED | OUTPATIENT
Start: 2023-07-31 | End: 2023-07-31 | Stop reason: SURG

## 2023-07-31 RX ORDER — NALOXONE HCL 0.4 MG/ML
0.2 VIAL (ML) INJECTION AS NEEDED
Status: DISCONTINUED | OUTPATIENT
Start: 2023-07-31 | End: 2023-07-31 | Stop reason: HOSPADM

## 2023-07-31 RX ORDER — ACETAMINOPHEN 500 MG
1000 TABLET ORAL EVERY 8 HOURS
Status: DISCONTINUED | OUTPATIENT
Start: 2023-07-31 | End: 2023-08-01 | Stop reason: HOSPADM

## 2023-07-31 RX ORDER — PROMETHAZINE HYDROCHLORIDE 25 MG/1
25 SUPPOSITORY RECTAL ONCE AS NEEDED
Status: DISCONTINUED | OUTPATIENT
Start: 2023-07-31 | End: 2023-07-31 | Stop reason: HOSPADM

## 2023-07-31 RX ORDER — SODIUM CHLORIDE 0.9 % (FLUSH) 0.9 %
3-10 SYRINGE (ML) INJECTION AS NEEDED
Status: DISCONTINUED | OUTPATIENT
Start: 2023-07-31 | End: 2023-07-31 | Stop reason: HOSPADM

## 2023-07-31 RX ORDER — EPHEDRINE SULFATE 50 MG/ML
5 INJECTION, SOLUTION INTRAVENOUS ONCE AS NEEDED
Status: DISCONTINUED | OUTPATIENT
Start: 2023-07-31 | End: 2023-07-31 | Stop reason: HOSPADM

## 2023-07-31 RX ORDER — FAMOTIDINE 10 MG/ML
20 INJECTION, SOLUTION INTRAVENOUS ONCE
Status: DISCONTINUED | OUTPATIENT
Start: 2023-07-31 | End: 2023-07-31 | Stop reason: HOSPADM

## 2023-07-31 RX ORDER — EPHEDRINE SULFATE 50 MG/ML
INJECTION, SOLUTION INTRAVENOUS AS NEEDED
Status: DISCONTINUED | OUTPATIENT
Start: 2023-07-31 | End: 2023-07-31 | Stop reason: SURG

## 2023-07-31 RX ORDER — LIDOCAINE HYDROCHLORIDE 20 MG/ML
INJECTION, SOLUTION EPIDURAL; INFILTRATION; INTRACAUDAL; PERINEURAL AS NEEDED
Status: DISCONTINUED | OUTPATIENT
Start: 2023-07-31 | End: 2023-07-31 | Stop reason: SURG

## 2023-07-31 RX ORDER — SODIUM CHLORIDE, SODIUM LACTATE, POTASSIUM CHLORIDE, CALCIUM CHLORIDE 600; 310; 30; 20 MG/100ML; MG/100ML; MG/100ML; MG/100ML
9 INJECTION, SOLUTION INTRAVENOUS CONTINUOUS
Status: DISCONTINUED | OUTPATIENT
Start: 2023-07-31 | End: 2023-07-31

## 2023-07-31 RX ORDER — ONDANSETRON 2 MG/ML
4 INJECTION INTRAMUSCULAR; INTRAVENOUS ONCE AS NEEDED
Status: DISCONTINUED | OUTPATIENT
Start: 2023-07-31 | End: 2023-07-31 | Stop reason: HOSPADM

## 2023-07-31 RX ORDER — DIPHENHYDRAMINE HYDROCHLORIDE 50 MG/ML
12.5 INJECTION INTRAMUSCULAR; INTRAVENOUS
Status: DISCONTINUED | OUTPATIENT
Start: 2023-07-31 | End: 2023-07-31 | Stop reason: HOSPADM

## 2023-07-31 RX ORDER — ONDANSETRON 4 MG/1
4 TABLET, FILM COATED ORAL EVERY 6 HOURS PRN
Status: DISCONTINUED | OUTPATIENT
Start: 2023-07-31 | End: 2023-08-01 | Stop reason: HOSPADM

## 2023-07-31 RX ORDER — CEFAZOLIN SODIUM 2 G/100ML
2000 INJECTION, SOLUTION INTRAVENOUS ONCE
Status: COMPLETED | OUTPATIENT
Start: 2023-07-31 | End: 2023-07-31

## 2023-07-31 RX ORDER — PROPOFOL 10 MG/ML
VIAL (ML) INTRAVENOUS AS NEEDED
Status: DISCONTINUED | OUTPATIENT
Start: 2023-07-31 | End: 2023-07-31 | Stop reason: SURG

## 2023-07-31 RX ORDER — FENTANYL CITRATE 50 UG/ML
INJECTION, SOLUTION INTRAMUSCULAR; INTRAVENOUS AS NEEDED
Status: DISCONTINUED | OUTPATIENT
Start: 2023-07-31 | End: 2023-07-31 | Stop reason: SURG

## 2023-07-31 RX ORDER — PROTAMINE SULFATE 10 MG/ML
INJECTION, SOLUTION INTRAVENOUS AS NEEDED
Status: DISCONTINUED | OUTPATIENT
Start: 2023-07-31 | End: 2023-07-31 | Stop reason: SURG

## 2023-07-31 RX ORDER — FENTANYL CITRATE 50 UG/ML
50 INJECTION, SOLUTION INTRAMUSCULAR; INTRAVENOUS ONCE AS NEEDED
Status: DISCONTINUED | OUTPATIENT
Start: 2023-07-31 | End: 2023-07-31 | Stop reason: HOSPADM

## 2023-07-31 RX ORDER — HYDROCODONE BITARTRATE AND ACETAMINOPHEN 5; 325 MG/1; MG/1
1 TABLET ORAL ONCE AS NEEDED
Status: COMPLETED | OUTPATIENT
Start: 2023-07-31 | End: 2023-07-31

## 2023-07-31 RX ORDER — CEFAZOLIN SODIUM 2 G/100ML
2000 INJECTION, SOLUTION INTRAVENOUS EVERY 8 HOURS
Status: COMPLETED | OUTPATIENT
Start: 2023-07-31 | End: 2023-08-01

## 2023-07-31 RX ORDER — SODIUM CHLORIDE 0.9 % (FLUSH) 0.9 %
3 SYRINGE (ML) INJECTION EVERY 12 HOURS SCHEDULED
Status: DISCONTINUED | OUTPATIENT
Start: 2023-07-31 | End: 2023-07-31 | Stop reason: HOSPADM

## 2023-07-31 RX ORDER — OXYCODONE HYDROCHLORIDE 5 MG/1
5 TABLET ORAL EVERY 4 HOURS PRN
Status: DISCONTINUED | OUTPATIENT
Start: 2023-07-31 | End: 2023-08-01 | Stop reason: HOSPADM

## 2023-07-31 RX ORDER — LABETALOL HYDROCHLORIDE 5 MG/ML
5 INJECTION, SOLUTION INTRAVENOUS
Status: DISCONTINUED | OUTPATIENT
Start: 2023-07-31 | End: 2023-07-31 | Stop reason: HOSPADM

## 2023-07-31 RX ORDER — FENTANYL CITRATE 50 UG/ML
25 INJECTION, SOLUTION INTRAMUSCULAR; INTRAVENOUS
Status: DISCONTINUED | OUTPATIENT
Start: 2023-07-31 | End: 2023-07-31 | Stop reason: HOSPADM

## 2023-07-31 RX ORDER — MIDAZOLAM HYDROCHLORIDE 1 MG/ML
0.5 INJECTION INTRAMUSCULAR; INTRAVENOUS
Status: DISCONTINUED | OUTPATIENT
Start: 2023-07-31 | End: 2023-07-31 | Stop reason: HOSPADM

## 2023-07-31 RX ORDER — HYDRALAZINE HYDROCHLORIDE 20 MG/ML
5 INJECTION INTRAMUSCULAR; INTRAVENOUS
Status: DISCONTINUED | OUTPATIENT
Start: 2023-07-31 | End: 2023-07-31 | Stop reason: HOSPADM

## 2023-07-31 RX ORDER — PROMETHAZINE HYDROCHLORIDE 25 MG/1
25 TABLET ORAL ONCE AS NEEDED
Status: DISCONTINUED | OUTPATIENT
Start: 2023-07-31 | End: 2023-07-31 | Stop reason: HOSPADM

## 2023-07-31 RX ORDER — ROCURONIUM BROMIDE 10 MG/ML
INJECTION, SOLUTION INTRAVENOUS AS NEEDED
Status: DISCONTINUED | OUTPATIENT
Start: 2023-07-31 | End: 2023-07-31 | Stop reason: SURG

## 2023-07-31 RX ORDER — HYDROMORPHONE HYDROCHLORIDE 1 MG/ML
0.25 INJECTION, SOLUTION INTRAMUSCULAR; INTRAVENOUS; SUBCUTANEOUS
Status: DISCONTINUED | OUTPATIENT
Start: 2023-07-31 | End: 2023-07-31 | Stop reason: HOSPADM

## 2023-07-31 RX ORDER — IPRATROPIUM BROMIDE AND ALBUTEROL SULFATE 2.5; .5 MG/3ML; MG/3ML
3 SOLUTION RESPIRATORY (INHALATION) ONCE AS NEEDED
Status: DISCONTINUED | OUTPATIENT
Start: 2023-07-31 | End: 2023-07-31 | Stop reason: HOSPADM

## 2023-07-31 RX ORDER — GLYCOPYRROLATE 0.2 MG/ML
INJECTION INTRAMUSCULAR; INTRAVENOUS AS NEEDED
Status: DISCONTINUED | OUTPATIENT
Start: 2023-07-31 | End: 2023-07-31 | Stop reason: SURG

## 2023-07-31 RX ORDER — ENOXAPARIN SODIUM 100 MG/ML
40 INJECTION SUBCUTANEOUS DAILY
Status: DISCONTINUED | OUTPATIENT
Start: 2023-08-01 | End: 2023-08-01 | Stop reason: HOSPADM

## 2023-07-31 RX ADMIN — TERAZOSIN 2 MG: 2 CAPSULE ORAL at 20:01

## 2023-07-31 RX ADMIN — Medication 50 MCG: at 16:07

## 2023-07-31 RX ADMIN — EPHEDRINE SULFATE 5 MG: 50 INJECTION, SOLUTION INTRAVENOUS at 16:01

## 2023-07-31 RX ADMIN — FAMOTIDINE 20 MG: 10 INJECTION INTRAVENOUS at 12:55

## 2023-07-31 RX ADMIN — Medication 10 ML: at 08:36

## 2023-07-31 RX ADMIN — ROCURONIUM BROMIDE 50 MG: 10 INJECTION, SOLUTION INTRAVENOUS at 15:06

## 2023-07-31 RX ADMIN — Medication 50 MCG: at 16:09

## 2023-07-31 RX ADMIN — CLOPIDOGREL BISULFATE 75 MG: 75 TABLET, FILM COATED ORAL at 08:36

## 2023-07-31 RX ADMIN — EPHEDRINE SULFATE 5 MG: 50 INJECTION, SOLUTION INTRAVENOUS at 15:55

## 2023-07-31 RX ADMIN — ACETAMINOPHEN 1000 MG: 500 TABLET, FILM COATED ORAL at 20:01

## 2023-07-31 RX ADMIN — PANTOPRAZOLE SODIUM 40 MG: 40 TABLET, DELAYED RELEASE ORAL at 06:42

## 2023-07-31 RX ADMIN — PROPOFOL 20 MG: 10 INJECTION, EMULSION INTRAVENOUS at 15:08

## 2023-07-31 RX ADMIN — LIDOCAINE HYDROCHLORIDE 80 MG: 20 INJECTION, SOLUTION EPIDURAL; INFILTRATION; INTRACAUDAL; PERINEURAL at 15:06

## 2023-07-31 RX ADMIN — MIDAZOLAM 0.5 MG: 1 INJECTION INTRAMUSCULAR; INTRAVENOUS at 12:56

## 2023-07-31 RX ADMIN — GLYCOPYRROLATE 0.2 MCG: 1 INJECTION INTRAMUSCULAR; INTRAVENOUS at 15:13

## 2023-07-31 RX ADMIN — SODIUM CHLORIDE, POTASSIUM CHLORIDE, SODIUM LACTATE AND CALCIUM CHLORIDE 9 ML/HR: 600; 310; 30; 20 INJECTION, SOLUTION INTRAVENOUS at 12:55

## 2023-07-31 RX ADMIN — Medication 100 MCG: at 16:02

## 2023-07-31 RX ADMIN — Medication 10 ML: at 20:03

## 2023-07-31 RX ADMIN — EPHEDRINE SULFATE 10 MG: 50 INJECTION, SOLUTION INTRAVENOUS at 15:30

## 2023-07-31 RX ADMIN — DOCUSATE SODIUM 100 MG: 100 CAPSULE, LIQUID FILLED ORAL at 20:02

## 2023-07-31 RX ADMIN — ASPIRIN 325 MG: 325 TABLET ORAL at 08:36

## 2023-07-31 RX ADMIN — Medication 100 MCG: at 15:52

## 2023-07-31 RX ADMIN — SUGAMMADEX 300 MG: 100 INJECTION, SOLUTION INTRAVENOUS at 16:29

## 2023-07-31 RX ADMIN — CEFAZOLIN SODIUM 2000 MG: 2 INJECTION, SOLUTION INTRAVENOUS at 14:49

## 2023-07-31 RX ADMIN — CEFAZOLIN SODIUM 2000 MG: 2 INJECTION, SOLUTION INTRAVENOUS at 22:51

## 2023-07-31 RX ADMIN — PROPOFOL 30 MG: 10 INJECTION, EMULSION INTRAVENOUS at 16:32

## 2023-07-31 RX ADMIN — ATORVASTATIN CALCIUM 80 MG: 80 TABLET, FILM COATED ORAL at 20:01

## 2023-07-31 RX ADMIN — HYDROCODONE BITARTRATE AND ACETAMINOPHEN 1 TABLET: 5; 325 TABLET ORAL at 17:57

## 2023-07-31 RX ADMIN — ROCURONIUM BROMIDE 10 MG: 10 INJECTION, SOLUTION INTRAVENOUS at 15:45

## 2023-07-31 RX ADMIN — IOPAMIDOL 25 ML: 510 INJECTION, SOLUTION INTRAVASCULAR at 16:43

## 2023-07-31 RX ADMIN — EPHEDRINE SULFATE 5 MG: 50 INJECTION, SOLUTION INTRAVENOUS at 15:49

## 2023-07-31 RX ADMIN — FENTANYL CITRATE 25 MCG: 50 INJECTION, SOLUTION INTRAMUSCULAR; INTRAVENOUS at 15:05

## 2023-07-31 RX ADMIN — HEPARIN SODIUM 10000 UNITS: 1000 INJECTION, SOLUTION INTRAVENOUS; SUBCUTANEOUS at 15:41

## 2023-07-31 RX ADMIN — FENTANYL CITRATE 25 MCG: 50 INJECTION, SOLUTION INTRAMUSCULAR; INTRAVENOUS at 15:34

## 2023-07-31 RX ADMIN — PHENYLEPHRINE HYDROCHLORIDE 0.5 MCG/KG/MIN: 10 INJECTION, SOLUTION INTRAVENOUS at 16:07

## 2023-07-31 RX ADMIN — LIDOCAINE HYDROCHLORIDE 40 MG: 20 INJECTION, SOLUTION EPIDURAL; INFILTRATION; INTRACAUDAL; PERINEURAL at 16:31

## 2023-07-31 RX ADMIN — PROTAMINE SULFATE 50 MG: 10 INJECTION, SOLUTION INTRAVENOUS at 16:15

## 2023-07-31 RX ADMIN — FENTANYL CITRATE 50 MCG: 50 INJECTION, SOLUTION INTRAMUSCULAR; INTRAVENOUS at 12:58

## 2023-07-31 RX ADMIN — PROPOFOL 100 MG: 10 INJECTION, EMULSION INTRAVENOUS at 15:06

## 2023-07-31 RX ADMIN — GLYCOPYRROLATE 0.2 MCG: 1 INJECTION INTRAMUSCULAR; INTRAVENOUS at 15:22

## 2023-07-31 NOTE — PLAN OF CARE
Goal Outcome Evaluation:  Plan of Care Reviewed With: patient, family        Progress: improving     VSS. TRES Levine x3, disoriented to time. Daughter @ bedside to help w/ sundowning confusion. NIHSS = 1. NPO @ midnight. Consent signed & in chart. Plans for R TCAR stent today.

## 2023-07-31 NOTE — PROGRESS NOTES
Name: Magan Lubin ADMIT: 2023   : 1935  PCP: Jo Ann Friend APRN    MRN: [id number] LOS: 5 days   AGE/SEX: 88 y.o. male  ROOM: 64/     Subjective   Subjective   Feeling fine again today. Neuro symptoms have resolved. Tolerating diet. Voiding fine.   No SOA or CP or palp. No N/V/D/abd pain. No F/C/NS.  Had a much better night last night--family stayed and did not let him take Remeron       Objective   Objective   Vital Signs  Temp:  [97.6 øF (36.4 øC)-98.3 øF (36.8 øC)] 98.3 øF (36.8 øC)  Heart Rate:  [53-73] 53  Resp:  [16-18] 16  BP: (106-138)/(62-73) 106/62  SpO2:  [92 %-95 %] 92 %  on   ;   Device (Oxygen Therapy): room air  Body mass index is 22.81 kg/mý.    (No change in exam today)    Physical Exam  Vitals and nursing note reviewed. Exam conducted with a chaperone present (Family x 4).   Constitutional:       General: He is not in acute distress.     Appearance: He is ill-appearing (chronically). He is not toxic-appearing or diaphoretic.   HENT:      Head: Normocephalic.      Nose: Nose normal.      Mouth/Throat:      Mouth: Mucous membranes are moist.      Pharynx: Oropharynx is clear.   Eyes:      General: No scleral icterus.        Right eye: No discharge.         Left eye: No discharge.      Extraocular Movements: Extraocular movements intact.      Conjunctiva/sclera: Conjunctivae normal.   Cardiovascular:      Rate and Rhythm: Normal rate and regular rhythm.      Pulses: Normal pulses.   Pulmonary:      Effort: Pulmonary effort is normal. No respiratory distress.      Breath sounds: Normal breath sounds. No wheezing or rales.   Abdominal:      General: Bowel sounds are normal. There is no distension.      Palpations: Abdomen is soft.      Tenderness: There is no abdominal tenderness.   Musculoskeletal:         General: Swelling (trace in BLEs) present. Normal range of motion.      Cervical back: Neck supple. No rigidity.   Lymphadenopathy:      Cervical: No cervical adenopathy.    Skin:     General: Skin is warm and dry.      Capillary Refill: Capillary refill takes less than 2 seconds.      Coloration: Skin is not jaundiced.   Neurological:      General: No focal deficit present.      Mental Status: He is alert. Mental status is at baseline.      Cranial Nerves: No cranial nerve deficit.      Coordination: Coordination normal.      Comments: Oriented to person and year   Psychiatric:         Mood and Affect: Mood normal.         Behavior: Behavior normal.         Thought Content: Thought content normal.     Results Review     I reviewed the patient's new clinical results.  Results from last 7 days   Lab Units 07/31/23  0609 07/30/23  0501 07/29/23  0439 07/26/23  0406   WBC 10*3/mm3 4.88 6.41 5.94 6.45   HEMOGLOBIN g/dL 12.2* 13.0 12.4* 11.7*   PLATELETS 10*3/mm3 206 222 218 211       Results from last 7 days   Lab Units 07/31/23  0609 07/30/23  0501 07/29/23 0439 07/26/23  0406   SODIUM mmol/L 139 139 138 139   POTASSIUM mmol/L 3.8 3.8 4.0 3.8   CHLORIDE mmol/L 106 105 104 106   CO2 mmol/L 22.4 23.0 21.7* 25.0   BUN mg/dL 16 20 23 17   CREATININE mg/dL 0.80 0.78 0.83 0.86   GLUCOSE mg/dL 99 106* 90 98   EGFR mL/min/1.73 86.2 86.9 85.2 84.3       Results from last 7 days   Lab Units 07/31/23  0609 07/30/23  0501 07/26/23  0406 07/25/23  1503   ALBUMIN g/dL 3.8 4.2 3.8 4.4   BILIRUBIN mg/dL 0.4 0.4 0.4 0.6   ALK PHOS U/L 108 111 87 95   AST (SGOT) U/L 19 23 16 20   ALT (SGPT) U/L 23 21 17 23       Results from last 7 days   Lab Units 07/31/23  0609 07/30/23  0501 07/29/23  0439 07/26/23  0406 07/25/23  1503   CALCIUM mg/dL 9.2 9.5 9.0 9.2 9.8   ALBUMIN g/dL 3.8 4.2  --  3.8 4.4   MAGNESIUM mg/dL 2.2 2.2  --   --   --          No results found for: HGBA1C, POCGLU    CT Chest With Contrast Diagnostic    Result Date: 7/30/2023  1. 4 mm noncalcified pulmonary nodule in the left upper lobe. Recommend follow-up with chest CT in 8-12 months. 2. Diffuse pulmonary emphysema. 3. Atherosclerotic  disease with coronary arterial calcifications 4. Extensive calcified pleural plaques.  Radiation dose reduction techniques were utilized, including automated exposure control and exposure modulation based on body size.  This report was finalized on 7/30/2023 6:23 PM by Dr. Ehsan Meza M.D.       I have personally reviewed all medications:  Scheduled Medications  aspirin, 325 mg, Oral, Daily   Or  aspirin, 300 mg, Rectal, Daily  atorvastatin, 80 mg, Oral, Nightly  clopidogrel, 75 mg, Oral, Daily  docusate sodium, 100 mg, Oral, BID  mirtazapine, 7.5 mg, Oral, Nightly  pantoprazole, 40 mg, Oral, Q AM  sodium chloride, 10 mL, Intravenous, Q12H  terazosin, 2 mg, Oral, Nightly    Infusions   Diet  NPO Diet NPO Type: Strict NPO    I have personally reviewed:  [x]  Laboratory   []  Microbiology   [x]  Radiology   []  EKG/Telemetry  []  Cardiology/Vascular   []  Pathology    []  Records       Assessment/Plan     Active Hospital Problems    Diagnosis  POA    **Ischemic stroke [I63.9]  Yes    Left upper lobe pulmonary nodule [R91.1]  Yes    Symptomatic carotid artery stenosis [I65.29]  Yes    Left upper extremity numbness [R20.0]  Yes    Benign prostatic hyperplasia without lower urinary tract symptoms [N40.0]  Yes    History of prostate cancer [Z85.46]  Not Applicable      Resolved Hospital Problems   No resolved problems to display.       88-year-old gentleman that presented to the hospital with dizziness, left upper extremity numbness, and a fall at home. He was initially placed in the observation unit where MRI brain showed scattered embolic infarcts in the right cerebral hemisphere. CTA head & neck revealed bilateral carotid stenosis. Neurology felt that the right carotid stenosis was symptomatic leading to the stroke and consulted Vascular Surg for further eval/recs.  Cardiology was consulted for preoperative clearance and [facility] was consulted to admit pt from the observation unit.     Acute right sided embolic  CVAs  Symptomatic right carotid artery stenosis  -Neurology input appreciated. Recommend  mg daily and Plavix 75 mg daily for at least 3 months.  -High intensity statin.  -Vascular Surg consulted and plans right transcervical carotid artery stent placement today.   -Vascular Surg recommended inpatient stay until this could be done.  -Cardiology consulted for preoperative clearance which was obtained.  -Transferred to      BPH  History of prostate cancer  -Monitor for any urinary retention.  -Continue alpha-blocker.    Left upper lobe lung nodule  -Seen on CT C-spine  -Also noted to have RML nodule on CXR about a year ago that was never followed up that I can see  -CT Chest here showed 4mm DAVIS nodule, recommend f/u CT Chest in 8-12 months  -Tiny calcified nodule right lung base    Sundowning  -Family did not let him take Remeron last night and said he did much better, slept very well w/o any agitation  -Will dc Remeron      SCDs for DVT prophylaxis.  Full code.  Discussed with patient, family x 4.  Anticipate discharge home with family and HH, tomorrow at the earliest.      Bennett Conner MD  [facility]  07/31/23  10:57 EDT

## 2023-07-31 NOTE — OP NOTE
Date of Admission:  7/25/2023  Today's Date:  07/31/23  Alan Kendrick MD  [facility]    Preoperative Diagnosis:   Severe symptomatic carotid artery stenosis.    Postoperative Diagnosis:   Same    Procedure Performed:   Right transcervical carotid artery stent placement with flow reversal.  Ultrasound-guided vascular access of the Left common femoral vein    CPT:  33646  52058-69    Surgeon:   Alan Kendrick MD    Assistant:    Elba DOS SANTOS, Provided critical assistance in exposure, retraction, and suction that overall decrease blood loss and operative time.    Anesthesia:   General    Estimated Blood Loss:   25-50 cc    Findings:    Severe well over 70% stenosis in the right proximal internal carotid artery with successful transcervical carotid artery stent placement with reversal of flow.  Minimal residual wasting at site of stenosis        Implants:    Implant Name Type Inv. Item Serial No.  Lot No. LRB No. Used Action   KT SEAL HEMOS ABS FLOSEAL MATRX 1.5/FAST/PREP 5000/IU 5ML - [id number] Implant KT SEAL HEMOS ABS FLOSEAL MATRX 1.5/FAST/PREP 5000/IU 5ML  [facility] [id number] Right 1 Implanted   STNT ENROUTE TRANSCAROTID [id number] - [id number] Stent STNT ENROUTE TRANSCAROTID [id number]  [facility] [id number] Right 1 Implanted       Staff:   Circulator: Bernice Watson RN  Radiology Technologist: Regine Alonso  Scrub Person: Kellie Niño  Vendor Representative: Kamran Meza  Assistant: Elba Dow CSA    Specimen:   none    Complications:   none    Dispo:   to PACU    Indication for procedure:   88 y.o. male with severe right carotid artery stenosis with recent right hemispheric stroke    Risk benefits alternatives discussed with the patient include but not limited to bleeding, infection, stroke, heart attack, cranial nerve injury and need for additional operations.    Description of procedure:   Patient was taken to the operating room.   Anesthesia was induced without difficulty.  Surgical sites were prepped and draped in the usual sterile manner.  A full surgical timeout was performed.  Incision was made just distal to the clavicle over the proximal common carotid artery with a 15 blade scalpel.  Bovie electrocautery was used to dissect through the platysma muscle.  Heads of the sternocleidomastoid were divided.  Carotid sheath was entered.  Vagus nerve was identified and protected throughout the case.  Jugular vein was dissected off the common carotid artery.  Circumferential control was performed of the common carotid artery with an umbilical tape.  Heparin was administered.  Serial ACT's were checked through the case to ensure adequate therapeutic anticoagulation.  5-0 Prolene suture was placed in the common carotid artery and a figure-of-eight configuration.    Ultrasound-guided femoral vein access contralateral to the carotid and incision was performed.  Microwire was placed without difficulty.  Wire placement confirmed with fluoroscopy.  Microsheath placed followed by 035 Glidewire.  The venous return sheath from the [address] system was placed under fluoroscopic guidance and flushed appropriately.    After adequate ACT's, blood pressure, and heart rate were confirmed microneedle access of the common carotid artery was performed and microwire placed in 2 cm.  Microneedle removed and microsheath placed to 2 cm.  Access arteriogram performed in 2 orthogonal projections confirmed atraumatic common carotid artery access and defined carotid artery lesion.  Supplied stiff J-guidewire placed followed by the carotid artery sheath.  Care was taken not to engage the lesion with the wires or sheath.  The filter was hooked up between the cervical sheath and the venous sheath and passive flow reversal started.  2 projection arteriogram confirmed atraumatic access.  Sheath was secured with silk sutures.  Common carotid artery was secured with a Bimal  tourniquet.  Flow reversal was confirmed with angiogram.  Internal carotid artery lesion was crossed under fluoroscopic guidance with the 014 guidewire.  Predilatation was performed of the lesions.  Ultimately the stent was deployed with good apposition proximally and distally.  No postdilatation was performed as the stent was thought to be well expanded except for some minimal/not flow-limiting wasting.  2 minutes of flow active flow reversal was performed.  All wires catheters and sheaths were removed.  Figure-of-eight suture secured for good hemostasis.  Doppler was used to interrogate common carotid artery flow.  There is no defect.  Protamine was administered.  Meticulous hemostasis confirmed.  Sternocleidomastoid was reapproximated with 3-0 Vicryl platysma with 4-0 Vicryl and skin with a 4-0 Vicryl in a subcuticular manner.  Skin glue applied.  Femoral sheath was removed with direct pressure for good hemostasis.    At case completion all counts were correct x2.    Patient woke up anesthesia with no gross motor or neurologic deficits.    Alan Kendrick MD  07/31/23     Active Hospital Problems    Diagnosis  POA    **Ischemic stroke [I63.9]  Yes    Left upper lobe pulmonary nodule [R91.1]  Yes    Symptomatic carotid artery stenosis [I65.29]  Yes    Left upper extremity numbness [R20.0]  Yes    Benign prostatic hyperplasia without lower urinary tract symptoms [N40.0]  Yes    History of prostate cancer [Z85.46]  Not Applicable      Resolved Hospital Problems   No resolved problems to display.

## 2023-08-01 ENCOUNTER — READMISSION MANAGEMENT (OUTPATIENT)
Dept: CALL CENTER | Facility: HOSPITAL | Age: 88
End: 2023-08-01
Payer: MEDICARE

## 2023-08-01 VITALS
WEIGHT: 168.2 LBS | RESPIRATION RATE: 16 BRPM | HEART RATE: 57 BPM | SYSTOLIC BLOOD PRESSURE: 97 MMHG | OXYGEN SATURATION: 96 % | BODY MASS INDEX: 22.78 KG/M2 | TEMPERATURE: 98 F | HEIGHT: 72 IN | DIASTOLIC BLOOD PRESSURE: 61 MMHG

## 2023-08-01 LAB
ACT BLD: 137 SECONDS (ref 82–152)
ACT BLD: 143 SECONDS (ref 82–152)
ACT BLD: 991 SECONDS (ref 82–152)
ALBUMIN SERPL-MCNC: 3.3 G/DL (ref 3.5–5.2)
ALBUMIN/GLOB SERPL: 1.3 G/DL
ALP SERPL-CCNC: 95 U/L (ref 39–117)
ALT SERPL W P-5'-P-CCNC: 19 U/L (ref 1–41)
ANION GAP SERPL CALCULATED.3IONS-SCNC: 10.2 MMOL/L (ref 5–15)
AST SERPL-CCNC: 16 U/L (ref 1–40)
BASE EXCESS BLDA CALC-SCNC: -1 MMOL/L (ref -5–5)
BILIRUB SERPL-MCNC: 0.7 MG/DL (ref 0–1.2)
BUN SERPL-MCNC: 14 MG/DL (ref 8–23)
BUN/CREAT SERPL: 17.5 (ref 7–25)
CA-I BLDA-SCNC: ABNORMAL MMOL/L
CALCIUM SPEC-SCNC: 9.1 MG/DL (ref 8.6–10.5)
CHLORIDE SERPL-SCNC: 103 MMOL/L (ref 98–107)
CO2 BLDA-SCNC: 25 MMOL/L (ref 24–29)
CO2 SERPL-SCNC: 22.8 MMOL/L (ref 22–29)
CREAT SERPL-MCNC: 0.8 MG/DL (ref 0.76–1.27)
DEPRECATED RDW RBC AUTO: 42.2 FL (ref 37–54)
EGFRCR SERPLBLD CKD-EPI 2021: 86.2 ML/MIN/1.73
ERYTHROCYTE [DISTWIDTH] IN BLOOD BY AUTOMATED COUNT: 11.9 % (ref 12.3–15.4)
GLOBULIN UR ELPH-MCNC: 2.5 GM/DL
GLUCOSE BLDC GLUCOMTR-MCNC: 102 MG/DL (ref 70–130)
GLUCOSE SERPL-MCNC: 106 MG/DL (ref 65–99)
HCO3 BLDA-SCNC: 24 MMOL/L (ref 22–26)
HCT VFR BLD AUTO: 32.8 % (ref 37.5–51)
HCT VFR BLDA CALC: 35 % (ref 38–51)
HGB BLD-MCNC: 11.3 G/DL (ref 13–17.7)
HGB BLDA-MCNC: 11.9 G/DL (ref 12–17)
MAGNESIUM SERPL-MCNC: 2 MG/DL (ref 1.6–2.4)
MCH RBC QN AUTO: 33.4 PG (ref 26.6–33)
MCHC RBC AUTO-ENTMCNC: 34.5 G/DL (ref 31.5–35.7)
MCV RBC AUTO: 97 FL (ref 79–97)
PCO2 BLDA: 42.6 MM HG (ref 35–45)
PH BLDA: 7.37 PH UNITS (ref 7.35–7.6)
PLATELET # BLD AUTO: 195 10*3/MM3 (ref 140–450)
PMV BLD AUTO: 9.4 FL (ref 6–12)
PO2 BLDA: 404 MMHG (ref 80–105)
POTASSIUM BLDA-SCNC: 3.9 MMOL/L (ref 3.5–4.9)
POTASSIUM SERPL-SCNC: 3.9 MMOL/L (ref 3.5–5.2)
PROT SERPL-MCNC: 5.8 G/DL (ref 6–8.5)
RBC # BLD AUTO: 3.38 10*6/MM3 (ref 4.14–5.8)
SAO2 % BLDA: 100 % (ref 95–98)
SODIUM SERPL-SCNC: 136 MMOL/L (ref 136–145)
WBC NRBC COR # BLD: 6.58 10*3/MM3 (ref 3.4–10.8)

## 2023-08-01 PROCEDURE — 25010000002 CEFAZOLIN IN DEXTROSE 2-4 GM/100ML-% SOLUTION: Performed by: SURGERY

## 2023-08-01 PROCEDURE — 97165 OT EVAL LOW COMPLEX 30 MIN: CPT

## 2023-08-01 PROCEDURE — 97530 THERAPEUTIC ACTIVITIES: CPT

## 2023-08-01 PROCEDURE — 80053 COMPREHEN METABOLIC PANEL: CPT | Performed by: SURGERY

## 2023-08-01 PROCEDURE — 83735 ASSAY OF MAGNESIUM: CPT | Performed by: SURGERY

## 2023-08-01 PROCEDURE — 97116 GAIT TRAINING THERAPY: CPT

## 2023-08-01 PROCEDURE — 25010000002 ENOXAPARIN PER 10 MG: Performed by: SURGERY

## 2023-08-01 PROCEDURE — 97535 SELF CARE MNGMENT TRAINING: CPT

## 2023-08-01 PROCEDURE — 85027 COMPLETE CBC AUTOMATED: CPT | Performed by: SURGERY

## 2023-08-01 RX ORDER — ASPIRIN 325 MG
325 TABLET, DELAYED RELEASE (ENTERIC COATED) ORAL DAILY
Qty: 30 TABLET | Refills: 0 | Status: SHIPPED | OUTPATIENT
Start: 2023-08-02

## 2023-08-01 RX ORDER — ACETAMINOPHEN 500 MG
1000 TABLET ORAL EVERY 8 HOURS
Qty: 42 TABLET | Refills: 0 | Status: SHIPPED | OUTPATIENT
Start: 2023-08-01 | End: 2023-08-08

## 2023-08-01 RX ORDER — CLOPIDOGREL BISULFATE 75 MG/1
75 TABLET ORAL DAILY
Qty: 30 TABLET | Refills: 0 | Status: SHIPPED | OUTPATIENT
Start: 2023-08-02

## 2023-08-01 RX ORDER — PSEUDOEPHEDRINE HCL 30 MG
100 TABLET ORAL 2 TIMES DAILY
Qty: 30 CAPSULE | Refills: 0 | Status: SHIPPED | OUTPATIENT
Start: 2023-08-01

## 2023-08-01 RX ORDER — ATORVASTATIN CALCIUM 80 MG/1
80 TABLET, FILM COATED ORAL NIGHTLY
Qty: 90 TABLET | Refills: 0 | Status: SHIPPED | OUTPATIENT
Start: 2023-08-01

## 2023-08-01 RX ORDER — PANTOPRAZOLE SODIUM 40 MG/1
40 TABLET, DELAYED RELEASE ORAL
Qty: 30 TABLET | Refills: 0 | Status: SHIPPED | OUTPATIENT
Start: 2023-08-02

## 2023-08-01 RX ADMIN — Medication 10 ML: at 09:33

## 2023-08-01 RX ADMIN — PANTOPRAZOLE SODIUM 40 MG: 40 TABLET, DELAYED RELEASE ORAL at 06:12

## 2023-08-01 RX ADMIN — DOCUSATE SODIUM 100 MG: 100 CAPSULE, LIQUID FILLED ORAL at 09:32

## 2023-08-01 RX ADMIN — ACETAMINOPHEN 1000 MG: 500 TABLET, FILM COATED ORAL at 05:08

## 2023-08-01 RX ADMIN — ACETAMINOPHEN 1000 MG: 500 TABLET, FILM COATED ORAL at 13:00

## 2023-08-01 RX ADMIN — CEFAZOLIN SODIUM 2000 MG: 2 INJECTION, SOLUTION INTRAVENOUS at 06:12

## 2023-08-01 RX ADMIN — ASPIRIN 325 MG: 325 TABLET ORAL at 09:32

## 2023-08-01 RX ADMIN — ENOXAPARIN SODIUM 40 MG: 100 INJECTION SUBCUTANEOUS at 09:32

## 2023-08-01 RX ADMIN — CLOPIDOGREL BISULFATE 75 MG: 75 TABLET, FILM COATED ORAL at 09:32

## 2023-08-01 NOTE — PLAN OF CARE
Goal Outcome Evaluation:  Plan of Care Reviewed With: patient        Progress: improving  Outcome Evaluation: Pt seen for PT treatment today. pt is SBA with bed mobility and CGA with STS transfers which pt performed at EOB twice. Pt a little unsteady with gait so pt used rwx to assist. Pt able to ambulate 150ft with CGA, rwx. Pt needed cues for upright posture and increasing step length. Pt will benefit from HHPT and will need assistance at home. Pt stated he has a walker which I advised him to use at home.

## 2023-08-01 NOTE — PROGRESS NOTES
Name: Magan Lubin ADMIT: 2023   : 1935  PCP: Jo Ann Friend APRN    MRN: [id number] LOS: 6 days   AGE/SEX: 88 y.o. male  ROOM: [address]    [place], Post Op [employer]    Chief Complaint   Patient presents with    Extremity Weakness     CC: Postop TCAR.  Subjective     88 y.o. male patient doing well this morning.  Appropriately answers questions.  No motor deficits.    Review of Systems    Objective     Scheduled Medications:   acetaminophen, 1,000 mg, Oral, Q8H  aspirin, 325 mg, Oral, Daily  atorvastatin, 80 mg, Oral, Nightly  clopidogrel, 75 mg, Oral, Daily  docusate sodium, 100 mg, Oral, BID  enoxaparin, 40 mg, Subcutaneous, Daily  pantoprazole, 40 mg, Oral, Q AM  sodium chloride, 10 mL, Intravenous, Q12H  terazosin, 2 mg, Oral, Nightly        Active Infusions:       As Needed Medications:    nitroglycerin    nitroglycerin    ondansetron    ondansetron **OR** ondansetron    oxyCODONE    phenol    sodium chloride    sodium chloride    sodium chloride    Vital Signs  Vital Signs Patient Vitals for the past 24 hrs:   BP Temp Temp src Pulse Resp SpO2 Height   23 0700 103/56 97.7 øF (36.5 øC) Oral 55 16 94 % --   23 2337 (!) 84/49 98.9 øF (37.2 øC) Oral 60 16 96 % --   23 1925 125/53 97.6 øF (36.4 øC) Oral 63 16 96 % --   23 1848 111/54 97.5 øF (36.4 øC) Oral 63 16 97 % --   23 1830 118/59 97.7 øF (36.5 øC) -- 57 -- 97 % --   23 1815 117/59 -- -- 61 -- 98 % --   23 1800 114/63 -- -- 62 -- 97 % --   23 1745 112/59 97.3 øF (36.3 øC) Oral 60 -- 97 % --   23 1730 102/62 -- -- 60 -- 98 % --   23 1715 111/62 -- -- 63 -- 99 % --   23 1700 120/58 -- -- 65 -- 100 % --   23 1655 122/67 -- -- 61 -- 100 % --   23 1651 120/63 -- -- 60 16 100 % --   23 1301 111/47 -- -- 50 16 97 % --   23 1258 110/65 -- -- (!) 48 18 97 % --   23 1256 122/68 -- -- 50 18 97 % --   23 1214 123/79 97.9 øF (36.6 øC)  "Oral (!) 48 18 97 % 182.9 cm (72\")   07/31/23 1139 108/59 97.6 øF (36.4 øC) Oral 54 16 96 % --     I/O:  I/O last 3 completed shifts:  In: 1220 [P.O.:220; I.V.:1000]  Out: 1200 [Urine:1200]    Physical Exam:  Physical Exam     Results Review:     CBC    Results from last 7 days   Lab Units 08/01/23  0529 07/31/23  1547 07/31/23  0609 07/30/23  0501 07/29/23  0439 07/26/23  0406 07/25/23  1503   WBC 10*3/mm3 6.58  --  4.88 6.41 5.94 6.45 6.65   HEMOGLOBIN g/dL 11.3*  --  12.2* 13.0 12.4* 11.7* 12.9*   HEMOGLOBIN, POC g/dL  --  11.9*  --   --   --   --   --    PLATELETS 10*3/mm3 195  --  206 222 218 211 218     BMP   Results from last 7 days   Lab Units 08/01/23  0529 07/31/23  0609 07/30/23  0501 07/29/23  0439 07/26/23  0406 07/25/23  1503   SODIUM mmol/L 136 139 139 138 139 139   POTASSIUM mmol/L 3.9 3.8 3.8 4.0 3.8 4.0   CHLORIDE mmol/L 103 106 105 104 106 104   CO2 mmol/L 22.8 22.4 23.0 21.7* 25.0 24.8   BUN mg/dL 14 16 20 23 17 17   CREATININE mg/dL 0.80 0.80 0.78 0.83 0.86 0.87   GLUCOSE mg/dL 106* 99 106* 90 98 103*   MAGNESIUM mg/dL 2.0 2.2 2.2  --   --   --        Assessment & Plan     Assessment & Plan      Ischemic stroke    History of prostate cancer    Benign prostatic hyperplasia without lower urinary tract symptoms    Left upper extremity numbness    Symptomatic carotid artery stenosis    Left upper lobe pulmonary nodule      88 y.o. male stop day #1 symptomatic right TCAR for severe stenosis.  We will remove arterial line.  Goal maps above 60.  Okay for discharge from my standpoint.  We will need dual antiplatelet therapy and high intensity statin.  We will follow-up with me in 2 weeks.      Alan Kendrick MD  08/01/23  08:07 EDT    Please call my office with any question: [phone]    Active Hospital Problems    Diagnosis  POA    **Ischemic stroke [I63.9]  Yes    Left upper lobe pulmonary nodule [R91.1]  Yes    Symptomatic carotid artery stenosis [I65.29]  Yes    Left upper extremity numbness " [R20.0]  Yes    Benign prostatic hyperplasia without lower urinary tract symptoms [N40.0]  Yes    History of prostate cancer [Z85.46]  Not Applicable      Resolved Hospital Problems   No resolved problems to display.

## 2023-08-01 NOTE — THERAPY TREATMENT NOTE
Patient Name: Magan Lubin  : 1935    MRN: [id number]                              Today's Date: 2023       Admit Date: 2023    Visit Dx:     ICD-10-CM ICD-9-CM   1. Paresthesia of left upper extremity  R20.2 782.0   2. Fall, initial encounter  W19.XXXA E888.9   3. Injury of head, initial encounter  S09.90XA 959.01   4. Lung nodule seen on imaging study  R91.1 793.11   5. Ischemic stroke  I63.9 434.91   6. Symptomatic stenosis of right carotid artery  I65.21 433.10     Patient Active Problem List   Diagnosis    Bilateral carotid artery stenosis    Asymptomatic carotid artery stenosis    History of prostate cancer    Benign prostatic hyperplasia without lower urinary tract symptoms    Benign prostatic hyperplasia with lower urinary tract symptoms    Prostate cancer    Left upper extremity numbness    Symptomatic carotid artery stenosis    Ischemic stroke    Left upper lobe pulmonary nodule     Past Medical History:   Diagnosis Date    Hyperlipidemia      Past Surgical History:   Procedure Laterality Date    APPENDECTOMY      HERNIA REPAIR      KNEE ARTHROPLASTY, PARTIAL REPLACEMENT Bilateral       General Information       Row Name 23 1317          Physical Therapy Time and Intention    Document Type therapy note (daily note)  -EB     Mode of Treatment individual therapy;physical therapy  -EB       Row Name 23 1317          General Information    Patient Profile Reviewed yes  -EB     Existing Precautions/Restrictions fall  -EB       Row Name 23 1317          Cognition    Orientation Status (Cognition) oriented x 3  -EB       Row Name 23 1317          Safety Issues, Functional Mobility    Impairments Affecting Function (Mobility) endurance/activity tolerance;strength;balance  -EB               User Key  (r) = Recorded By, (t) = Taken By, (c) = Cosigned By      Initials Name Provider Type    EB Sandra Caballero PTA Physical Therapist Assistant                   Mobility        Row Name 08/01/23 1317          Bed Mobility    Supine-Sit [place] (Bed Mobility) standby assist  -EB     Assistive Device (Bed Mobility) bed rails;head of bed elevated  -EB       Row Name 08/01/23 1317          Sit-Stand Transfer    Sit-Stand [place] (Transfers) contact guard  -EB     Comment, (Sit-Stand Transfer) 2 STS transfers  -EB       Row Name 08/01/23 1317          Gait/Stairs (Locomotion)    [place] Level (Gait) contact guard  -EB     Assistive Device (Gait) walker, front-wheeled  -EB     Distance in Feet (Gait) 150ft  -EB     Deviations/Abnormal Patterns (Gait) festinating/shuffling;gait speed decreased;geovani decreased;base of support, narrow;stride length decreased  -EB     Bilateral Gait Deviations forward flexed posture  -EB     Comment, (Gait/Stairs) cues for bigger steps and upright posture.  -EB               User Key  (r) = Recorded By, (t) = Taken By, (c) = Cosigned By      Initials Name Provider Type    EB Sandra Caballero PTA Physical Therapist Assistant                   Obj/Interventions    No documentation.                  Goals/Plan    No documentation.                  Clinical Impression       Row Name 08/01/23 1319          Plan of Care Review    Plan of Care Reviewed With patient  -EB     Progress improving  -EB     Outcome Evaluation Pt seen for PT treatment today. pt is SBA with bed mobility and CGA with STS transfers which pt performed at [facility] twice. Pt a little unsteady with gait so pt used rwx to assist. Pt able to ambulate 150ft with CGA, rwx. Pt needed cues for upright posture and increasing step length. Pt will benefit from HHPT and will need assistance at home. Pt stated he has a walker which I advised him to use at home.  -       Row Name 08/01/23 1319          Therapy Assessment/Plan (PT)    Therapy Frequency (PT) 5 times/wk  -       Row Name 08/01/23 1319          Positioning and Restraints    Pre-Treatment Position in bed  -EB     Post Treatment Position  chair  -EB     In Chair reclined;call light within reach;encouraged to call for assist;exit alarm on  -EB               User Key  (r) = Recorded By, (t) = Taken By, (c) = Cosigned By      Initials Name Provider Type    Sandra Casarez PTA Physical Therapist Assistant                   Outcome Measures       Row Name 08/01/23 2961          How much help from another person do you currently need...    Turning from your back to your side while in flat bed without using bedrails? 3  -EB     Moving from lying on back to sitting on the side of a flat bed without bedrails? 3  -EB     Moving to and from a bed to a chair (including a wheelchair)? 3  -EB     Standing up from a chair using your arms (e.g., wheelchair, bedside chair)? 3  -EB     Climbing 3-5 steps with a railing? 2  -EB     To walk in hospital room? 3  -EB     AM-PAC 6 Clicks Score (PT) 17  -EB     Highest level of mobility 5 --> Static standing  -EB               User Key  (r) = Recorded By, (t) = Taken By, (c) = Cosigned By      Initials Name Provider Type    Sandra Casarez PTA Physical Therapist Assistant                                 Physical Therapy Education       Title: PT OT SLP Therapies (Resolved)       Topic: Physical Therapy (Resolved)       Point: Mobility training (Resolved)       Learning Progress Summary             Patient Acceptance, E, VU,NR by KT at 8/1/2023 1123    Acceptance, E, VU by SM at 7/28/2023 1148    Acceptance, E, VU by SM at 7/27/2023 1154                         Point: Home exercise program (Resolved)       Learning Progress Summary             Patient Acceptance, E, VU,NR by KT at 8/1/2023 1123    Acceptance, E, VU by SM at 7/28/2023 1148    Acceptance, E, VU by SM at 7/27/2023 1154                         Point: Body mechanics (Resolved)       Learning Progress Summary             Patient Acceptance, E, VU,NR by KT at 8/1/2023 1123    Acceptance, E, VU by SM at 7/28/2023 1148    Acceptance, E, VU by SM at 7/27/2023 1154                          Point: Precautions (Resolved)       Learning Progress Summary             Patient Acceptance, E, VU,NR by KT at 8/1/2023 1123    Acceptance, E, VU by  at 7/28/2023 1148    Acceptance, E, VU by  at 7/27/2023 1154                                         User Key       Initials Effective Dates Name Provider Type Discipline    KT 06/16/21 -  Licha Murray RN Registered Nurse Nurse     05/02/22 -  Dana Michel, PT Physical Therapist PT                  PT Recommendation and Plan     Plan of Care Reviewed With: patient  Progress: improving  Outcome Evaluation: Pt seen for PT treatment today. pt is SBA with bed mobility and CGA with STS transfers which pt performed at [facility] twice. Pt a little unsteady with gait so pt used rwx to assist. Pt able to ambulate 150ft with CGA, rwx. Pt needed cues for upright posture and increasing step length. Pt will benefit from HHPT and will need assistance at home. Pt stated he has a walker which I advised him to use at home.     Time Calculation:         PT Charges       Row Name 08/01/23 1317             Time Calculation    Start Time 0951  -EB      Stop Time 1008  -EB      Time Calculation (min) 17 min  -EB      PT Received On 08/01/23  -EB      PT - Next Appointment 08/02/23  -EB         Time Calculation- PT    Total Timed Code Minutes- PT 17 minute(s)  -EB                User Key  (r) = Recorded By, (t) = Taken By, (c) = Cosigned By      Initials Name Provider Type    EB Sandra Caballero PTA Physical Therapist Assistant                  Therapy Charges for Today       Code Description Service Date Service Provider Modifiers Qty    84646777569 HC GAIT TRAINING EA 15 MIN 8/1/2023 Sandra Caballero PTA GP 1            PT G-Codes  Outcome Measure Options: AM-PAC 6 Clicks Basic Mobility (PT)  AM-PAC 6 Clicks Score (PT): 17       Sandra Caballero PTA  8/1/2023

## 2023-08-01 NOTE — PLAN OF CARE
Goal Outcome Evaluation:      Vital signs stable. Alert and oriented to place, person. Disoriented to time and sometimes situation. Up with assistance of one out of bed. Bed alarm turned on. Patient forgetful at times, gets up without calling for help. PT and OT worked with patient in room and ambulated in hallway. Walker used and encouraged to be used at home. Generalized weakness noted. Patient anxious to go home, lives with spouse. On room air. Sinus bradycardia cardiac rhythm. Tolerating low fat / low cholesterol heart healthy diet well. Voiding without difficulty. Denies pain or nausea. Right neck incision JUANA with liquid skin adhesive dry/intact. Left groin gauze dressing dry/intact. Discharged home in stable condition. Daughter will be providing transportation home. Will continue to monitor.

## 2023-08-01 NOTE — PLAN OF CARE
Goal Outcome Evaluation:  Plan of Care Reviewed With: patient           Outcome Evaluation: Pt is a 88 y.o male admitted to [facility] on 7/25 with ischemic stroke now POD 1 TCAR for severe stenosis. Pt lives with his spouse at baseline. He reports he is independent with his ADLs, mobility, goes outside and mows yard, etc. Today pt disoriented to time and somewhat disoriented to place. RN reports pt has some baseline memory deficits. He does need increased assist with dressing self, balance, mobility all contributing to decreased independence with ADLs, safety, falls risk. Pt plans to dc home today, I discussed with RN and CCP pt below his reported baseline and would benefit from HH OT at dc as well as discussed safety with ADLs, using rwx at this time, having family assist with transfers and mobility using gait belt CGA. He would benefit from continued OT if he does not dc home today to further address ADLs/balance/safety.      Anticipated Discharge Disposition (OT): home with home health, home with 24/7 care

## 2023-08-01 NOTE — THERAPY EVALUATION
Patient Name: Magan Lubin  : 1935    MRN: [id number]                              Today's Date: 2023       Admit Date: 2023    Visit Dx:     ICD-10-CM ICD-9-CM   1. Paresthesia of left upper extremity  R20.2 782.0   2. Fall, initial encounter  W19.XXXA E888.9   3. Injury of head, initial encounter  S09.90XA 959.01   4. Lung nodule seen on imaging study  R91.1 793.11   5. Ischemic stroke  I63.9 434.91   6. Symptomatic stenosis of right carotid artery  I65.21 433.10     Patient Active Problem List   Diagnosis    Bilateral carotid artery stenosis    Asymptomatic carotid artery stenosis    History of prostate cancer    Benign prostatic hyperplasia without lower urinary tract symptoms    Benign prostatic hyperplasia with lower urinary tract symptoms    Prostate cancer    Left upper extremity numbness    Symptomatic carotid artery stenosis    Ischemic stroke    Left upper lobe pulmonary nodule     Past Medical History:   Diagnosis Date    Hyperlipidemia      Past Surgical History:   Procedure Laterality Date    APPENDECTOMY      HERNIA REPAIR      KNEE ARTHROPLASTY, PARTIAL REPLACEMENT Bilateral       General Information       Row Name 23 1428          OT Time and Intention    Document Type evaluation  -     Mode of Treatment occupational therapy;individual therapy  -       Row Name 23 1428          General Information    Patient Profile Reviewed yes  -SM     Prior Level of Function independent:;ADL's;all household mobility;yard work  -     Existing Precautions/Restrictions fall  -       Row Name 23 1428          Occupational Profile    Environmental Supports and Barriers (Occupational Profile) Pt reports he has a walker at home, shower chair, grab bars  -       Row Name 23 1428          Living Environment    People in Home spouse  -       Row Name 23 1428          Home Main Entrance    Number of Stairs, Main Entrance two  -       Row Name 23 1428           Stairs Within Home, Primary    Number of Stairs, Within Home, Primary none  -       Row Name 08/01/23 1428          Cognition    Orientation Status (Cognition) oriented to;person;situation  knows he is in a hospital but reports unsure of which one, reports year/month Feb 2017.  -       Row Name 08/01/23 1428          Safety Issues, Functional Mobility    Impairments Affecting Function (Mobility) balance;cognition;endurance/activity tolerance;strength  -               User Key  (r) = Recorded By, (t) = Taken By, (c) = Cosigned By      Initials Name Provider Type     Alvina Porras OT Occupational Therapist                     Mobility/ADL's       Row Name 08/01/23 1429          Sit-Stand Transfer    Sit-Stand [place] (Transfers) contact guard  -[facility] Name 08/01/23 1429          Functional Mobility    Functional Mobility- Ind. Level contact guard assist  -     Functional Mobility- Device walker, front-wheeled  -     Functional Mobility- Comment around room for ADLs, pt with forward flexed posture at times, also pt requesting to mobilize in [place]. OT observed pt mobilize household distances but does have some cogntive diffiuculty scanning for him room, attempts to go into a few wrong rooms after OT repeated pt's room number twice  -       Row Name 08/01/23 1429          Activities of Daily Living    BADL Assessment/Intervention lower body dressing;upper body dressing;feeding  -       Row Name 08/01/23 1429          Lower Body Dressing Assessment/Training    [place] Level (Lower Body Dressing) don;pants/bottoms;shoes/slippers;socks;minimum assist (75% patient effort)  -     Position (Lower Body Dressing) supported sitting;supported standing  -       Row Name 08/01/23 1429          Upper Body Dressing Assessment/Training    [place] Level (Upper Body Dressing) upper body dressing skills;pull-over garment;contact guard assist  -     Position (Upper Body Dressing)  unsupported standing  -SM       [facility] Name 08/01/23 1429          Self-Feeding Assessment/Training    [place] Level (Feeding) feeding skills;independent  -SM     Position (Self-Feeding) supported sitting  -SM               User Key  (r) = Recorded By, (t) = Taken By, (c) = Cosigned By      Initials Name Provider Type    Alvina Car OT Occupational Therapist                   Obj/Interventions       Row Name 08/01/23 1431          Sensory Assessment (Somatosensory)    Sensory Assessment (Somatosensory) UE sensation intact  -SM       Row Name 08/01/23 1431          Vision Assessment/Intervention    Visual Impairment/Limitations WFL  -SM       [facility] Name 08/01/23 1431          Range of Motion Comprehensive    General Range of Motion bilateral upper extremity ROM WFL  -[facility] Name 08/01/23 1431          Strength Comprehensive (MMT)    Comment, General Manual Muscle Testing (MMT) Assessment no unilateral or focal weakness noted in UEs. MMT grossly 4/5  -SM       [facility] Name 08/01/23 1431          Motor Skills    Motor Skills coordination  -     Coordination WFL;bilateral;upper extremity;finger to nose  -[facility] Name 08/01/23 1431          Balance    Static Standing Balance contact guard  -SM     Dynamic Standing Balance contact guard  -SM     Position/Device Used, Standing Balance supported;walker, rolling  -SM               User Key  (r) = Recorded By, (t) = Taken By, (c) = Cosigned By      Initials Name Provider Type    Alvina Car OT Occupational Therapist                   Goals/Plan       Row Name 08/01/23 1437          Problem Specific Goal 1 (OT)    Problem Specific Goal 1 (OT) Pt to verbalize and demonstrate understanding of safe technique for ADLs, transfers, AD use during ADLs in prep to [place] home with family support.  -SM     Time Frame (Problem Specific Goal 1, OT) short term goal (STG);by discharge  -SM     Progress/Outcome (Problem Specific Goal 1, OT) goal met  -SM                User Key  (r) = Recorded By, (t) = Taken By, (c) = Cosigned By      Initials Name Provider Type    Alvina Car, DHARA Occupational Therapist                   Clinical Impression       Row Name 08/01/23 1432          Pain Assessment    Pretreatment Pain Rating 0/10 - no pain  -     Posttreatment Pain Rating 0/10 - no pain  -       Row Name 08/01/23 1432          Plan of Care Review    Plan of Care Reviewed With patient  -     Outcome Evaluation Pt is a 88 y.o male admitted to [facility] on 7/25 with ischemic stroke now POD 1 TCAR for severe stenosis. Pt lives with his spouse at baseline. He reports he is independent with his ADLs, mobility, goes outside and mows yard, etc. Today pt disoriented to time and somewhat disoriented to place. RN reports pt has some baseline memory deficits. He does need increased assist with dressing self, balance, mobility all contributing to decreased independence with ADLs, safety, falls risk. Pt plans to dc home today, I discussed with RN and CCP pt below his reported baseline and would benefit from HH OT at dc as well as discussed safety with ADLs, using rwx at this time, having family assist with transfers and mobility using gait belt CGA. He would benefit from continued OT if he does not dc home today to further address ADLs/balance/safety.  -       Row Name 08/01/23 1432          Therapy Assessment/Plan (OT)    Rehab Potential (OT) good, to achieve stated therapy goals  -     Criteria for Skilled Therapeutic Interventions Met (OT) yes;skilled treatment is necessary  -     Therapy Frequency (OT) evaluation only  dc orders in place for home, will add frequency if dc plans change  -       Row Name 08/01/23 1432          Therapy Plan Review/Discharge Plan (OT)    Anticipated Discharge Disposition (OT) home with home health;home with 24/7 care  -       Row Name 08/01/23 1432          Positioning and Restraints    Pre-Treatment Position sitting in chair/recliner  -      Post Treatment Position chair  -SM     In Chair reclined;call light within reach;encouraged to call for assist;exit alarm on;notified nsg  -               User Key  (r) = Recorded By, (t) = Taken By, (c) = Cosigned By      Initials Name Provider Type    Alvina Car, OT Occupational Therapist                   Outcome Measures       Row Name 08/01/23 1438          How much help from another is currently needed...    Putting on and taking off regular lower body clothing? 3  -SM     Bathing (including washing, rinsing, and drying) 3  -SM     Toileting (which includes using toilet bed pan or urinal) 3  -SM     Putting on and taking off regular upper body clothing 3  -SM     Taking care of personal grooming (such as brushing teeth) 3  -SM     Eating meals 4  -SM     AM-PAC 6 Clicks Score (OT) 19  -       Row Name 08/01/23 1327          How much help from another person do you currently need...    Turning from your back to your side while in flat bed without using bedrails? 3  -EB     Moving from lying on back to sitting on the side of a flat bed without bedrails? 3  -EB     Moving to and from a bed to a chair (including a wheelchair)? 3  -EB     Standing up from a chair using your arms (e.g., wheelchair, bedside chair)? 3  -EB     Climbing 3-5 steps with a railing? 2  -EB     To walk in hospital room? 3  -EB     AM-PAC 6 Clicks Score (PT) 17  -EB     Highest level of mobility 5 --> Static standing  -       Row Name 08/01/23 1438          Modified Marva Scale    Modified Marva Scale 4 - Moderately severe disability.  Unable to walk without assistance, and unable to attend to own bodily needs without assistance.  -       Row Name 08/01/23 1438          Functional Assessment    Outcome Measure Options AM-PAC 6 Clicks Daily Activity (OT);Modified [place]  -               User Key  (r) = Recorded By, (t) = Taken By, (c) = Cosigned By      Initials Name Provider Type    Sandra Casarez PTA Physical  Therapist Assistant    Alvina Car OT Occupational Therapist                    Occupational Therapy Education       Title: PT OT SLP Therapies (In Progress)       Topic: Occupational Therapy (In Progress)       Point: ADL training (Done)       Description:   Instruct learner(s) on proper safety adaptation and remediation techniques during self care or transfers.   Instruct in proper use of assistive devices.                  Learning Progress Summary             Patient Acceptance, E, VU by  at 8/1/2023 1438    Comment: falls precautions, safety with ADLs upon dc, gait belt use                         Point: Home exercise program (Not Started)       Description:   Instruct learner(s) on appropriate technique for monitoring, assisting and/or progressing therapeutic exercises/activities.                  Learner Progress:  Not documented in this visit.              Point: Precautions (Done)       Description:   Instruct learner(s) on prescribed precautions during self-care and functional transfers.                  Learning Progress Summary             Patient Acceptance, E, VU by  at 8/1/2023 1438    Comment: falls precautions, safety with ADLs upon dc, gait belt use                         Point: Body mechanics (Not Started)       Description:   Instruct learner(s) on proper positioning and spine alignment during self-care, functional mobility activities and/or exercises.                  Learner Progress:  Not documented in this visit.                              User Key       Initials Effective Dates Name Provider Type Discipline     04/02/20 -  Alvina Porras OT Occupational Therapist OT                  OT Recommendation and Plan  Therapy Frequency (OT): evaluation only (dc orders in place for home, will add frequency if dc plans change)  Plan of Care Review  Plan of Care Reviewed With: patient  Outcome Evaluation: Pt is a 88 y.o male admitted to [facility] on 7/25 with ischemic stroke now POD 1  TCAR for severe stenosis. Pt lives with his spouse at baseline. He reports he is independent with his ADLs, mobility, goes outside and mows yard, etc. Today pt disoriented to time and somewhat disoriented to place. RN reports pt has some baseline memory deficits. He does need increased assist with dressing self, balance, mobility all contributing to decreased independence with ADLs, safety, falls risk. Pt plans to dc home today, I discussed with RN and CCP pt below his reported baseline and would benefit from HH OT at dc as well as discussed safety with ADLs, using rwx at this time, having family assist with transfers and mobility using gait belt CGA. He would benefit from continued OT if he does not dc home today to further address ADLs/balance/safety.     Time Calculation:   Evaluation Complexity (OT)  Review Occupational Profile/Medical/Therapy History Complexity: brief/low complexity  Assessment, Occupational Performance/Identification of Deficit Complexity: 1-3 performance deficits  Clinical Decision Making Complexity (OT): problem focused assessment/low complexity  Overall Complexity of Evaluation (OT): low complexity     Time Calculation- OT       Row Name 08/01/23 1439             Time Calculation- OT    OT Start Time 1357  -      OT Stop Time 1425  -      OT Time Calculation (min) 28 min  -SM      Total Timed Code Minutes- OT 23 minute(s)  -SM      OT Received On 08/01/23  -      OT - Next Appointment 08/02/23  -      OT Goal Re-Cert Due Date 08/15/23  -SM         Timed Charges    18783 - OT Therapeutic Activity Minutes 10  -SM      05766 - OT Self Care/Mgmt Minutes 13  -SM         Untimed Charges    OT Eval/Re-eval Minutes 5  -SM         Total Minutes    Timed Charges Total Minutes 23  -SM      Untimed Charges Total Minutes 5  -SM       Total Minutes 28  -SM                User Key  (r) = Recorded By, (t) = Taken By, (c) = Cosigned By      Initials Name Provider Type    Alvina Car OT  Occupational Therapist                  Therapy Charges for Today       Code Description Service Date Service Provider Modifiers Qty    43665662511 HC OT THERAPEUTIC ACT EA 15 MIN 8/1/2023 Alvina Porras OT GO 1    68941991588 HC OT SELF CARE/MGMT/TRAIN EA 15 MIN 8/1/2023 Alvina Porras OT GO 1    [id number]  OT EVAL LOW COMPLEXITY 2 8/1/2023 Alvina Porras OT GO 1                 Alvina Porras OT  8/1/2023

## 2023-08-01 NOTE — PLAN OF CARE
Goal Outcome Evaluation:  Plan of Care Reviewed With: patient        Progress: no change  Outcome Evaluation: VSS ex bradycardia at times and soft pressures. RA at 97-99%.  Oriented to person and place. Redirected multiple times to use call light. C/O sore throat, chloraseptic spray given. Will continue to monitor.

## 2023-08-01 NOTE — DISCHARGE SUMMARY
Patient Name: Magan Lubin  : 1935  MRN: [id number]    Date of Admission: 2023  Date of Discharge:  2023  Primary Care Physician: Jo Ann Friend APRN      Chief Complaint:   Extremity Weakness      Discharge Diagnoses     Active Hospital Problems    Diagnosis  POA    **Ischemic stroke [I63.9]  Yes    Left upper lobe pulmonary nodule [R91.1]  Yes    Symptomatic carotid artery stenosis [I65.29]  Yes    Left upper extremity numbness [R20.0]  Yes    Benign prostatic hyperplasia without lower urinary tract symptoms [N40.0]  Yes    History of prostate cancer [Z85.46]  Not Applicable      Resolved Hospital Problems   No resolved problems to display.        Hospital Course     Very pleasant 88-year-old gentleman that presented to the hospital with dizziness, left upper extremity numbness, and a fall at home. He was initially placed in the observation unit where MRI brain showed scattered embolic infarcts in the right cerebral hemisphere. CTA head & neck revealed bilateral carotid stenosis. Neurology felt that the right carotid stenosis was symptomatic leading to the stroke and consulted Vascular Surg for further eval/recs.  Cardiology was consulted for preoperative clearance and [facility] was consulted to admit pt from the observation unit. Please see below for details of admission:     Acute right sided embolic CVAs  Symptomatic right carotid artery stenosis  -Neurology input appreciated. Recommend  mg daily and Plavix 75 mg daily for at least 3 months.  -High intensity statin.  -Vascular Surg consulted and performed right transcervical carotid artery stent placement yesterday.   -Dr. Kendrick ([facility]) okay with pt's dc home today  -F/u with Dr. Kendrick in 2 weeks and [facility] in 3 months     BPH  History of prostate cancer  -No urinary retention here.  -Continued alpha-blocker.     >>>Left upper lobe lung nodule<<<  -Seen on CT C-spine  -Also noted to have RML nodule on CXR about a  year ago that was never followed up that I can see  -CT Chest here showed 4mm DAVIS nodule, recommend f/u CT Chest in 8-12 months  -Tiny calcified nodule right lung base     Sundowning  -Much better since [place] dc'd        SCDs for DVT prophylaxis while here.  Full code confirmed.  Discussed with patient.  Discharge home with family and HH today  F/u with PCP (Phuc DEWITT) in 1 week  F/u with Vasc ([facility]) in 2 weeks  F/u with Neuro ([facility]) in 3 months    Day of Discharge     Subjective:  Feeling fine again today. Neuro symptoms have resolved. Tolerating diet. Voiding fine. Underwent ADARSH stenting yesterday w/o complication. Very eager to go home.    ROS  No SOA or CP or palp. No N/V/D/abd pain. No F/C/NS.     Physical Exam:  Temp:  [97.3 øF (36.3 øC)-98.9 øF (37.2 øC)] 98 øF (36.7 øC)  Heart Rate:  [48-65] 55  Resp:  [16-18] 16  BP: ()/(47-79) 97/61  Body mass index is 22.81 kg/mý.  Physical Exam  Vitals and nursing note reviewed.  Constitutional:       General: He is not in acute distress.     Appearance: He is ill-appearing (chronically). He is not toxic-appearing or diaphoretic.   Cardiovascular:      Rate and Rhythm: Normal rate and regular rhythm.      Pulses: Normal pulses.   Pulmonary:      Effort: Pulmonary effort is normal. No respiratory distress.      Breath sounds: Normal breath sounds. No wheezing or rales.   Abdominal:      General: Bowel sounds are normal. There is no distension.      Palpations: Abdomen is soft.      Tenderness: There is no abdominal tenderness.   Musculoskeletal:         General: Swelling (trace in BLEs) present. Normal range of motion.      Cervical back: Neck supple. No rigidity. Incision right side looks excellent.  Skin:     General: Skin is warm and dry.      Capillary Refill: Capillary refill takes less than 2 seconds.      Coloration: Skin is not jaundiced.   Neurological:      General: No focal deficit present.      Mental Status: He is alert. Mental status  is at baseline.      Cranial Nerves: No cranial nerve deficit.      Coordination: Coordination normal.      Comments: Oriented to person and year   Psychiatric:         Mood and Affect: Mood normal.         Behavior: Behavior normal.         Thought Content: Thought content normal.      Consultants     Consult Orders (all) (From admission, onward)       Start     Ordered    07/26/23 1122  Inpatient Hospitalist Consult  Once        Specialty:  Hospitalist  Provider:  Adrian Becker MD    07/26/23 1121    07/26/23 1050  Inpatient Cardiology Consult  Once        Specialty:  Cardiology  Provider:  Elieser Robison MD    07/26/23 1049    07/26/23 1045  Inpatient Cardiology Consult  Once,   Status:  Canceled        Specialty:  Cardiology  Provider:  (Not yet assigned)    07/26/23 1049    07/25/23 2040  Inpatient Vascular Surgery Consult  Once        Specialty:  Vascular Surgery  Provider:  Alan Kendrick MD    07/25/23 2039 07/25/23 1704  Inpatient Neurology Consult Stroke  Once        Specialty:  Neurology  Provider:  Hero Albrecht MD    07/25/23 1708                  Procedures     RIGHT TRANSCAROTID ARTERY REVASCULARIZATION    Imaging Results (All)       Procedure Component Value Units Date/Time    Arteriogram (Autofinalize) [[id number]] Resulted: 07/31/23 1644     Updated: 07/31/23 1644    Narrative:      This procedure was auto-finalized with no dictation required.    CT Chest With Contrast Diagnostic [[id number]] Collected: 07/30/23 1716     Updated: 07/30/23 1826    Narrative:      CT CHEST WITH IV CONTRAST     HISTORY: Left upper lobe nodule     TECHNIQUE: CT chest includes axial imaging from the thoracic inlet to  the upper abdomen with IV contrast.      COMPARISON: AP chest 07/25/2023     FINDINGS: There is no evidence for mediastinal or hilar or axillary  kee enlargement.  Atherosclerotic calcifications are present and there  are extensive coronary arterial calcifications.  A very thin pericardial  effusion extends anterior to the right atrium.     There is diffuse pulmonary emphysema. Bilateral multifocal calcified  pleural plaques are present. Tiny calcified nodule is present within the  right lung base just above the right hemidiaphragm. Within the left  upper lobe on image 18 there is a 4 mm noncalcified nodule for which  follow-up with chest CT is recommended in 8-12 months. There is  dependent lower lobe atelectasis.     Imaging through the upper abdomen appears within normal limits.       Impression:      1. 4 mm noncalcified pulmonary nodule in the left upper lobe. Recommend  follow-up with chest CT in 8-12 months.  2. Diffuse pulmonary emphysema.  3. Atherosclerotic disease with coronary arterial calcifications  4. Extensive calcified pleural plaques.     Radiation dose reduction techniques were utilized, including automated  exposure control and exposure modulation based on body size.     This report was finalized on 7/30/2023 6:23 PM by Dr. Ehsan Meza M.D.       MRI Brain Without Contrast [[id number]] Collected: 07/25/23 2330     Updated: 07/25/23 2345    Narrative:      BRAIN MRI WITHOUT CONTRAST; CERVICAL SPINE MRI WITHOUT GADOLINIUM     Cervical MRI:     HISTORY: Stroke, follow up; R20.2-Paresthesia of skin;  W19.XXXA-Unspecified fall, initial encounter; S09.90XA-Unspecified  injury of head, initial encounter; R91.1-Solitary pulmonary nodule     COMPARISON:  None.     FINDINGS:  Multiplanar images of the cervical spine obtained without  gadolinium.  Axial images obtained from C2-T1.     No acute fracture or subluxation of the cervical spine is seen. There is  bony ankylosis noted at C3-C4. There is anterolisthesis of C6 on C7.  Intervertebral disc space narrowing is seen at multiple levels. There is  no prevertebral edema. No cord signal abnormalities are seen.     C2-C3: Central disc osteophyte complex indents the anterior aspect of  the sac, without canal stenosis  or neural foraminal narrowing.  C3-C4: Central disc osteophyte complex indents the anterior aspect of  the thecal sac, without significant canal stenosis. There is mild to  moderate bilateral neural foraminal narrowing, secondary to disc disease  and facet hypertrophy.  C4-C5: Central disc osteophyte complex indents the anterior aspect of  the thecal sac, without significant canal stenosis. There is mild to  moderate bilateral neural foraminal narrowing, exacerbated by facet  hypertrophy and disc disease.  C5-C6: There is a left-sided disc osteophyte complex, which mildly  narrows the left side of the canal and left neural foramen.  C6-C7: There is no canal stenosis or neural foraminal narrowing.  C7-T1: There is no canal stenosis or neural foraminal narrowing.        Brain MRI:           HISTORY: Stroke, follow up; R20.2-Paresthesia of skin;  W19.XXXA-Unspecified fall, initial encounter; S09.90XA-Unspecified  injury of head, initial encounter; R91.1-Solitary pulmonary nodule     COMPARISON: 07/20/2023.     FINDINGS:  Multiplanar images of the head were obtained without and with  gadolinium. Scattered foci of restricted diffusion are identified within  the right cerebral hemisphere. Largest area appears to be within the  right frontal lobe, measuring approximately 2.4 x 1.5 cm. No areas of  diffusion are noted within the left cerebral hemisphere. There is  atrophy. There is periventricular and deep white matter microangiopathic  change. There is no midline shift or mass effect. Old lacunar infarct is  noted within the left basal ganglia. No abnormality is seen on  susceptibility weighted imaging. Mucosal thickening is noted within the  ethmoid sinuses. There is a trace amount of fluid within the mastoid air  cells.          Impression:      1. Scattered foci of restricted diffusion identified within the right  cerebral hemisphere.    2. Degenerative changes of the cervical spine.     This report was finalized on  7/25/2023 11:42 PM by Dr. Rebeca Marshall M.D.       MRI Cervical Spine Without Contrast [[id number]] Collected: 07/25/23 2330     Updated: 07/25/23 2345    Narrative:      BRAIN MRI WITHOUT CONTRAST; CERVICAL SPINE MRI WITHOUT GADOLINIUM     Cervical MRI:     HISTORY: Stroke, follow up; R20.2-Paresthesia of skin;  W19.XXXA-Unspecified fall, initial encounter; S09.90XA-Unspecified  injury of head, initial encounter; R91.1-Solitary pulmonary nodule     COMPARISON:  None.     FINDINGS:  Multiplanar images of the cervical spine obtained without  gadolinium.  Axial images obtained from C2-T1.     No acute fracture or subluxation of the cervical spine is seen. There is  bony ankylosis noted at C3-C4. There is anterolisthesis of C6 on C7.  Intervertebral disc space narrowing is seen at multiple levels. There is  no prevertebral edema. No cord signal abnormalities are seen.     C2-C3: Central disc osteophyte complex indents the anterior aspect of  the sac, without canal stenosis or neural foraminal narrowing.  C3-C4: Central disc osteophyte complex indents the anterior aspect of  the thecal sac, without significant canal stenosis. There is mild to  moderate bilateral neural foraminal narrowing, secondary to disc disease  and facet hypertrophy.  C4-C5: Central disc osteophyte complex indents the anterior aspect of  the thecal sac, without significant canal stenosis. There is mild to  moderate bilateral neural foraminal narrowing, exacerbated by facet  hypertrophy and disc disease.  C5-C6: There is a left-sided disc osteophyte complex, which mildly  narrows the left side of the canal and left neural foramen.  C6-C7: There is no canal stenosis or neural foraminal narrowing.  C7-T1: There is no canal stenosis or neural foraminal narrowing.        Brain MRI:           HISTORY: Stroke, follow up; R20.2-Paresthesia of skin;  W19.XXXA-Unspecified fall, initial encounter; S09.90XA-Unspecified  injury of head, initial encounter;  R91.1-Solitary pulmonary nodule     COMPARISON: 07/20/2023.     FINDINGS:  Multiplanar images of the head were obtained without and with  gadolinium. Scattered foci of restricted diffusion are identified within  the right cerebral hemisphere. Largest area appears to be within the  right frontal lobe, measuring approximately 2.4 x 1.5 cm. No areas of  diffusion are noted within the left cerebral hemisphere. There is  atrophy. There is periventricular and deep white matter microangiopathic  change. There is no midline shift or mass effect. Old lacunar infarct is  noted within the left basal ganglia. No abnormality is seen on  susceptibility weighted imaging. Mucosal thickening is noted within the  ethmoid sinuses. There is a trace amount of fluid within the mastoid air  cells.          Impression:      1. Scattered foci of restricted diffusion identified within the right  cerebral hemisphere.    2. Degenerative changes of the cervical spine.     This report was finalized on 7/25/2023 11:42 PM by Dr. Rebeca Marshall M.D.       CT Angiogram Head [[id number]] Collected: 07/25/23 1912     Updated: 07/25/23 1927    Narrative:      CT ANGIOGRAM HEAD-, CT ANGIOGRAM NECK-     INDICATIONS: Left-sided weakness     TECHNIQUE: Radiation dose reduction techniques were utilized, including  automated exposure control and exposure modulation based on body  size.Noncontrast head CT was performed, followed by enhanced CT  angiography of the head and neck. Three-dimensional reconstructions were  created, reviewed, and assessed according to NASCET criteria.     COMPARISON: Correlated with head CT from 07/25/2023 at 1536 hours     FINDINGS:     No acute intracranial hemorrhage, midline shift or mass effect. No acute  territorial infarct is identified. 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     Mild periventricular hypodensity suggests chronic small vessel ischemic  change in a patient this age.           Ventricles,  cisterns, cerebral sulci are unremarkable for patient age.     The visualized paranasal sinuses, orbits, mastoid air cells are  unremarkable.      The CT angiography images show estimated 50% stenosis in the cavernous  segment of the left intracranial ICA. Estimated 70% focal stenosis at  the origin of the right ICA (coronal image 149). Estimated 60% focal  stenosis at the origin of the left ICA. Otherwise, no hemodynamically  significant focal stenosis, aneurysm, or dissection in the cervical  carotid or vertebral arteries, or in the arteries at the base of the  brain. The left A1 segment is diminutive, the anterior circulation being  at least predominantly supplied from the right. The right common carotid  artery is partly obscured by attenuation artifact, limiting its  assessment. Scattered calcifications are seen elsewhere in the carotid  and vertebral arteries without high-grade stenosis (0-49% stenosis).        Facet and uncovertebral joint hypertrophy contribute to neuroforaminal  narrowing, more prominent bilaterally at C3/4, C4/5, C5/6. Disc  osteophyte complex appears to result in mild central stenosis at C3/4.     The lung apices show emphysematous changes, mild atelectasis.       Impression:      1. Estimated 50% stenosis in the cavernous segment of the left  intracranial ICA. Estimated 70% focal stenosis at the origin of the  right ICA and 60% focal stenosis at the origin of the left ICA.  Otherwise, no hemodynamically significant focal stenosis, aneurysm, or  dissection in the cervical carotid or vertebral arteries or in the  arteries at the base of the brain.     2. No acute intracranial hemorrhage or hydrocephalus. Chronic changes of  the brain. No enhancing lesion in brain. If there is further clinical  concern, MRI could be considered for further evaluation.     This report was finalized on 7/25/2023 7:24 PM by Dr. Sacha Ayala M.D.       CT Angiogram Neck [[id number]] Collected: 07/25/23  1912     Updated: 07/25/23 1927    Narrative:      CT ANGIOGRAM HEAD-, CT ANGIOGRAM NECK-     INDICATIONS: Left-sided weakness     TECHNIQUE: Radiation dose reduction techniques were utilized, including  automated exposure control and exposure modulation based on body  size.Noncontrast head CT was performed, followed by enhanced CT  angiography of the head and neck. Three-dimensional reconstructions were  created, reviewed, and assessed according to NASCET criteria.     COMPARISON: Correlated with head CT from 07/25/2023 at 1536 hours     FINDINGS:     No acute intracranial hemorrhage, midline shift or mass effect. No acute  territorial infarct is identified. 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     Mild periventricular hypodensity suggests chronic small vessel ischemic  change in a patient this age.           Ventricles, cisterns, cerebral sulci are unremarkable for patient age.     The visualized paranasal sinuses, orbits, mastoid air cells are  unremarkable.      The CT angiography images show estimated 50% stenosis in the cavernous  segment of the left intracranial ICA. Estimated 70% focal stenosis at  the origin of the right ICA (coronal image 149). Estimated 60% focal  stenosis at the origin of the left ICA. Otherwise, no hemodynamically  significant focal stenosis, aneurysm, or dissection in the cervical  carotid or vertebral arteries, or in the arteries at the base of the  brain. The left A1 segment is diminutive, the anterior circulation being  at least predominantly supplied from the right. The right common carotid  artery is partly obscured by attenuation artifact, limiting its  assessment. Scattered calcifications are seen elsewhere in the carotid  and vertebral arteries without high-grade stenosis (0-49% stenosis).        Facet and uncovertebral joint hypertrophy contribute to neuroforaminal  narrowing, more prominent bilaterally at C3/4, C4/5, C5/6. Disc  osteophyte complex appears to  result in mild central stenosis at C3/4.     The lung apices show emphysematous changes, mild atelectasis.       Impression:      1. Estimated 50% stenosis in the cavernous segment of the left  intracranial ICA. Estimated 70% focal stenosis at the origin of the  right ICA and 60% focal stenosis at the origin of the left ICA.  Otherwise, no hemodynamically significant focal stenosis, aneurysm, or  dissection in the cervical carotid or vertebral arteries or in the  arteries at the base of the brain.     2. No acute intracranial hemorrhage or hydrocephalus. Chronic changes of  the brain. No enhancing lesion in brain. If there is further clinical  concern, MRI could be considered for further evaluation.     This report was finalized on 7/25/2023 7:24 PM by Dr. Sacha Ayala M.D.       CT Head Without Contrast Stroke Protocol [[id number]] Collected: 07/25/23 1553     Updated: 07/25/23 1607    Narrative:      CT HEAD WO CONTRAST STROKE PROTOCOL-, CT CERVICAL SPINE WO CONTRAST-     INDICATIONS: Stroke, numbness     TECHNIQUE: Radiation dose reduction techniques were utilized, including  automated exposure control and exposure modulation based on body size.  Noncontrast head CT, cervical spine CT     COMPARISON: None available     FINDINGS:     Head CT:     No acute intracranial hemorrhage, midline shift or mass effect. No acute  territorial infarct is identified. 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 Basal ganglia mineralization is present.     Mild periventricular hypodensities suggest chronic small vessel ischemic  change in a patient this age.     Arterial calcifications are seen at the base of the brain.     Ventricles, cisterns, cerebral sulci are unremarkable for patient age.     The visualized paranasal sinuses, orbits, mastoid air cells are  unremarkable.           Cervical spine CT:     No acute fracture is identified. Mild anterolisthesis of C6 on C7, C7 on  T1.     Facet and uncovertebral  joint hypertrophy contribute to neuroforaminal  narrowing, more prominent on the right at C3/4, C5/6, and on the left at  C5/6. Disc osteophyte complex appears to result in mild central stenosis  at C3/4.     Bilateral carotid arterial calcifications are present. Tiny  subcentimeter thyroid nodules are evident.        Emphysematous and fibronodular changes are seen at the lung apices. A  partly included left upper lobe nodule measures 5 mm on axial image 173,  possibility of neoplasm not excluded, follow-up chest CT advised.             Impression:      1. A partly included indeterminate left upper lobe nodule, follow-up  chest CT advised.  2. No acute intracranial hemorrhage or hydrocephalus. No acute cervical  fracture identified; degenerative changes in cervical spine. If there is  further clinical concern, MRI could be considered for further  evaluation.     This report was finalized on 7/25/2023 4:04 PM by Dr. Sacha Ayala M.D.       CT Cervical Spine Without Contrast [[id number]] Collected: 07/25/23 1553     Updated: 07/25/23 1607    Narrative:      CT HEAD WO CONTRAST STROKE PROTOCOL-, CT CERVICAL SPINE WO CONTRAST-     INDICATIONS: Stroke, numbness     TECHNIQUE: Radiation dose reduction techniques were utilized, including  automated exposure control and exposure modulation based on body size.  Noncontrast head CT, cervical spine CT     COMPARISON: None available     FINDINGS:     Head CT:     No acute intracranial hemorrhage, midline shift or mass effect. No acute  territorial infarct is identified. 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 Basal ganglia mineralization is present.     Mild periventricular hypodensities suggest chronic small vessel ischemic  change in a patient this age.     Arterial calcifications are seen at the base of the brain.     Ventricles, cisterns, cerebral sulci are unremarkable for patient age.     The visualized paranasal sinuses, orbits, mastoid air cells  are  unremarkable.           Cervical spine CT:     No acute fracture is identified. Mild anterolisthesis of C6 on C7, C7 on  T1.     Facet and uncovertebral joint hypertrophy contribute to neuroforaminal  narrowing, more prominent on the right at C3/4, C5/6, and on the left at  C5/6. Disc osteophyte complex appears to result in mild central stenosis  at C3/4.     Bilateral carotid arterial calcifications are present. Tiny  subcentimeter thyroid nodules are evident.        Emphysematous and fibronodular changes are seen at the lung apices. A  partly included left upper lobe nodule measures 5 mm on axial image 173,  possibility of neoplasm not excluded, follow-up chest CT advised.             Impression:      1. A partly included indeterminate left upper lobe nodule, follow-up  chest CT advised.  2. No acute intracranial hemorrhage or hydrocephalus. No acute cervical  fracture identified; degenerative changes in cervical spine. If there is  further clinical concern, MRI could be considered for further  evaluation.     This report was finalized on 7/25/2023 4:04 PM by Dr. Sacha Ayala M.D.       XR Chest 1 View [[id number]] Collected: 07/25/23 1517     Updated: 07/25/23 1520    Narrative:      PORTABLE CHEST X-RAY     HISTORY: Stroke protocol.     Portable chest x-ray is provided. Correlation: None.     FINDINGS: The cardiomediastinal silhouette is normal. The lungs are  clear. The costophrenic sulci are dry and the bones appear normal. There  is no pneumothorax.       Impression:      Negative.     This report was finalized on 7/25/2023 3:17 PM by Dr. Bennett English M.D.             Results for orders placed during the hospital encounter of 03/17/22    Duplex Carotid Ultrasound CAR    Interpretation Summary  ú Proximal left internal carotid artery mild-moderate stenosis. (60 to 79%).  ú Proximal right internal carotid artery plaque without significant stenosis.  ú Heavy plaque is present in the bifurcations  bilaterally.  ú Vertebral flow is antegrade bilaterally.    Results for orders placed during the hospital encounter of 07/25/23    Adult Transthoracic Echo Complete W/ Cont if Necessary Per Protocol (With Agitated Saline)    Interpretation Summary    Left ventricular systolic function is normal. Calculated left ventricular EF = 66.1%    Left ventricular diastolic function was normal.    Saline test results are negative.    There is calcification of the aortic valve.    Estimated right ventricular systolic pressure from tricuspid regurgitation is mildly elevated (35-45 mmHg).    Mild pulmonary hypertension is present.    Pertinent Labs     Results from last 7 days   Lab Units 08/01/23  0529 07/31/23  1547 07/31/23  0609 07/30/23  0501 07/29/23  0439   WBC 10*3/mm3 6.58  --  4.88 6.41 5.94   HEMOGLOBIN g/dL 11.3*  --  12.2* 13.0 12.4*   HEMOGLOBIN, POC g/dL  --  11.9*  --   --   --    PLATELETS 10*3/mm3 195  --  206 222 218     Results from last 7 days   Lab Units 08/01/23  0529 07/31/23  0609 07/30/23  0501 07/29/23  0439   SODIUM mmol/L 136 139 139 138   POTASSIUM mmol/L 3.9 3.8 3.8 4.0   CHLORIDE mmol/L 103 106 105 104   CO2 mmol/L 22.8 22.4 23.0 21.7*   BUN mg/dL 14 16 20 23   CREATININE mg/dL 0.80 0.80 0.78 0.83   GLUCOSE mg/dL 106* 99 106* 90   EGFR mL/min/1.73 86.2 86.2 86.9 85.2     Results from last 7 days   Lab Units 08/01/23  0529 07/31/23  0609 07/30/23  0501 07/26/23  0406   ALBUMIN g/dL 3.3* 3.8 4.2 3.8   BILIRUBIN mg/dL 0.7 0.4 0.4 0.4   ALK PHOS U/L 95 108 111 87   AST (SGOT) U/L 16 19 23 16   ALT (SGPT) U/L 19 23 21 17     Results from last 7 days   Lab Units 08/01/23  0529 07/31/23  0609 07/30/23  0501 07/29/23  0439 07/26/23  0406   CALCIUM mg/dL 9.1 9.2 9.5 9.0 9.2   ALBUMIN g/dL 3.3* 3.8 4.2  --  3.8   MAGNESIUM mg/dL 2.0 2.2 2.2  --   --            Results from last 7 days   Lab Units 07/25/23  1503   CHOLESTEROL mg/dL 127   TRIGLYCERIDES mg/dL 47   HDL CHOL mg/dL 49   LDL CHOL mg/dL 67              Test Results Pending at Discharge       Discharge Details        Discharge Medications        New Medications        Instructions Start Date   acetaminophen 500 MG tablet  Commonly known as: TYLENOL   1,000 mg, Oral, Every 8 Hours      aspirin 325 MG tablet  Replaces: aspirin 81 MG EC tablet   325 mg, Oral, Daily   Start Date: August 2, 2023     clopidogrel 75 MG tablet  Commonly known as: PLAVIX   75 mg, Oral, Daily   Start Date: August 2, 2023     docusate sodium 100 MG capsule   100 mg, Oral, 2 Times Daily      pantoprazole 40 MG EC tablet  Commonly known as: PROTONIX   40 mg, Oral, Every Early Morning   Start Date: August 2, 2023            Changes to Medications        Instructions Start Date   atorvastatin 80 MG tablet  Commonly known as: LIPITOR  What changed:   medication strength  how much to take  when to take this   80 mg, Oral, Nightly      terazosin 2 MG capsule  Commonly known as: HYTRIN  What changed: when to take this   2 mg, Oral, Nightly             Stop These Medications      aspirin 81 MG EC tablet  Replaced by: aspirin 325 MG tablet     mirtazapine 7.5 MG tablet  Commonly known as: REMERON              Allergies   Allergen Reactions    Penicillins Unknown - Low Severity       Discharge Disposition:  Home-Health Care [facility]      Discharge Diet:  Diet Order   Procedures    Diet: Cardiac Diets; Healthy Heart (2-3 Na+); Texture: Regular Texture (IDDSI 7); Fluid Consistency: Thin (IDDSI 0)       Discharge Activity:   As tolerated    CODE STATUS:    Code Status and Medical Interventions:   Ordered at: 07/25/23 1952     Code Status (Patient has no pulse and is not breathing):    CPR (Attempt to Resuscitate)     Medical Interventions (Patient has pulse or is breathing):    Full Support     Release to patient:    Routine Release       No future appointments.  Additional Instructions for the Follow-ups that You Need to Schedule       Ambulatory Referral to Home Health   As directed      Face to Face  Visit Date: 8/1/2023   Follow-up provider for Plan of Care?: I treated the patient in an acute care facility and will not continue treatment after discharge.   Follow-up provider: MISSY MORROW [[id number]]   Reason/Clinical Findings: Multiple right sided acute CVAs, s/p right carotid stent 7/31   Describe mobility limitations that make leaving home difficult: Requires the assistance of another to leave the home   Nursing/Therapeutic Services Requested: Skilled Nursing Physical Therapy   Skilled nursing orders: Medication education Neurovascular assessments   PT orders: Therapeutic exercise Strengthening   Frequency: 1 Week 1        Discharge Follow-up with PCP   As directed       Currently Documented PCP:    Missy Morrow, TYRONE    PCP Phone Number:    [phone]     Follow Up Details: Phuc DEWITT (PCP) in 1 week        Discharge Follow-up with Specialty: [facility]; 3 Months   As directed      Specialty: [facility]   Follow Up: 3 Months        Discharge Follow-up with Specified Provider: Dr. Kendrick (Vasc Surg); 2 Weeks   As directed      To: Dr. Kendrick (Vasc Surg)   Follow Up: 2 Weeks               Follow-up Information       Alan Kendrick MD Follow up in 2 week(s).    Specialty: Vascular Surgery  Contact information:  [address]  [place] [id number] [phone]               Missy Morrow, APRN .    Specialties: Nurse Practitioner, Family Medicine  Why: Phuc DEWITT (PCP) in 1 week  Contact information:  [address] [facility] [address]  [facility] [id number] [phone]                             Additional Instructions for the Follow-ups that You Need to Schedule       Ambulatory Referral to Home Health   As directed      Face to Face Visit Date: 8/1/2023   Follow-up provider for Plan of Care?: I treated the patient in an acute care facility and will not continue treatment after discharge.   Follow-up provider: MISSY MORROW [[id number]]    Reason/Clinical Findings: Multiple right sided acute CVAs, s/p right carotid stent 7/31   Describe mobility limitations that make leaving home difficult: Requires the assistance of another to leave the home   Nursing/Therapeutic Services Requested: Skilled Nursing Physical Therapy   Skilled nursing orders: Medication education Neurovascular assessments   PT orders: Therapeutic exercise Strengthening   Frequency: 1 Week 1        Discharge Follow-up with PCP   As directed       Currently Documented PCP:    Jo Ann Friend APRN    PCP Phone Number:    [phone]     Follow Up Details: Phuc NP (PCP) in 1 week        Discharge Follow-up with Specialty: [facility]; 3 Months   As directed      Specialty: [facility]   Follow Up: 3 Months        Discharge Follow-up with Specified Provider: Dr. Kendrick (Vasc Surg); 2 Weeks   As directed      To: Dr. Kendrick (Vasc Surg)   Follow Up: 2 Weeks            Time Spent on Discharge:  Greater than 30 minutes      Bennett Conner MD  [facility]  08/01/23  12:04 EDT

## 2023-08-02 ENCOUNTER — TRANSITIONAL CARE MANAGEMENT TELEPHONE ENCOUNTER (OUTPATIENT)
Dept: CALL CENTER | Facility: HOSPITAL | Age: 88
End: 2023-08-02
Payer: MEDICARE

## 2023-08-03 ENCOUNTER — TELEPHONE (OUTPATIENT)
Dept: FAMILY MEDICINE CLINIC | Age: 88
End: 2023-08-03

## 2023-08-03 NOTE — TELEPHONE ENCOUNTER
Caller: PHILIP    Relationship to patient: [facility]    Best call back number: [phone]    Patient is needing: CALLER STATED THAT SHE SAW THE PATIENT FOR ADMISSION FOR HOME HEALTH YESTERDAY AND THAT SHE PLANS ON DOING 6 NURSING VISITS FOR STROKE EDUCATION ALONG WITH THERAPY.

## 2023-08-09 ENCOUNTER — TELEPHONE (OUTPATIENT)
Dept: FAMILY MEDICINE CLINIC | Age: 88
End: 2023-08-09
Payer: MEDICARE

## 2023-08-09 NOTE — TELEPHONE ENCOUNTER
Patient's daughter, Sonal Espana has dropped off some incomplete FMLA for her to be able to help with the care of her father. $20 fee has been paid and forms left in Lainey's bin.

## 2023-08-10 ENCOUNTER — READMISSION MANAGEMENT (OUTPATIENT)
Dept: CALL CENTER | Facility: HOSPITAL | Age: 88
End: 2023-08-10
Payer: MEDICARE

## 2023-08-10 NOTE — OUTREACH NOTE
Stroke Week 2 Survey      Flowsheet Row Responses   [facility] facility patient discharged from? [facility]   Does the patient have one of the following disease processes/diagnoses(primary or secondary)? Stroke   Week 2 attempt successful? No   Unsuccessful attempts Attempt 1   Discharge diagnosis Ischemic stroke            CATHRYN LAZARO - Registered Nurse

## 2023-08-11 ENCOUNTER — OFFICE VISIT (OUTPATIENT)
Dept: FAMILY MEDICINE CLINIC | Age: 88
End: 2023-08-11
Payer: MEDICARE

## 2023-08-11 VITALS
SYSTOLIC BLOOD PRESSURE: 116 MMHG | BODY MASS INDEX: 23.57 KG/M2 | OXYGEN SATURATION: 94 % | HEART RATE: 64 BPM | WEIGHT: 174 LBS | DIASTOLIC BLOOD PRESSURE: 68 MMHG | HEIGHT: 72 IN

## 2023-08-11 DIAGNOSIS — E06.0 ACUTE THYROIDITIS: ICD-10-CM

## 2023-08-11 DIAGNOSIS — Z95.828 INTERNAL CAROTID ARTERY STENT PRESENT: Primary | ICD-10-CM

## 2023-08-11 DIAGNOSIS — R91.1 LEFT UPPER LOBE PULMONARY NODULE: ICD-10-CM

## 2023-08-11 DIAGNOSIS — R79.89 ELEVATED TSH: ICD-10-CM

## 2023-08-11 DIAGNOSIS — I63.9 ISCHEMIC STROKE: ICD-10-CM

## 2023-08-11 DIAGNOSIS — Z87.891 FORMER SMOKER: ICD-10-CM

## 2023-08-11 PROBLEM — I65.29 ASYMPTOMATIC CAROTID ARTERY STENOSIS: Status: RESOLVED | Noted: 2022-03-30 | Resolved: 2023-08-11

## 2023-08-11 PROBLEM — N40.1 BENIGN PROSTATIC HYPERPLASIA WITH LOWER URINARY TRACT SYMPTOMS: Status: RESOLVED | Noted: 2022-12-03 | Resolved: 2023-08-11

## 2023-08-11 NOTE — PROGRESS NOTES
"Chief Complaint  Hospital Follow Up Visit ([facility] inpt - discharged on 8/1/23 - stroke)    Subjective          Magan Lubin presents to [facility]     Patient is a 88-year-old male who is here today with his wife.  On July 25 patient had been outside when he came in and told his wife that he thinks he may have had a stroke.  Patient reported having fallen outside and fell and numbness of his left arm.  EMS was called and he was taken to [facility] in [place] where MRI of the head showed scattered embolic infarcts and a CT angiogram showed bilateral carotid stenosis and a blockage of the right carotid artery.  Stent placed in right carotid artery per vascular, dr blakely with whom he will follow up on Tuesday August 15.  Left arm numbness has resolved.  Neurology started him on aspirin 325 mg daily with Plavix 75 mg daily to be taken for 3 months.  He will follow-up with [facility] stroke clinic in 3 months.  He was started on atorvastatin 80 mg daily.  [facility] is coming with the nurse twice per week and physical therapy twice per week.  Incidentally the CT of his chest showed a 4 mm right upper lobe nodule.  Follow-up chest CT was recommended in 8 to 12 months.  Patient is a former smoker.  Stopped smoking 10 years ago.  TSH level was elevated at 6.86 on July 31.  Patient and wife deny concerns today.  Patient denies any deficits of speech or mobility.           Objective   Vital Signs:   Vitals:    08/11/23 1111   BP: 116/68   BP Location: Left arm   Patient Position: Sitting   Cuff Size: Adult   Pulse: 64   SpO2: 94%   Weight: 78.9 kg (174 lb)   Height: 182.9 cm (72\")      Body mass index is 23.6 kg/mý.    BMI is within normal parameters. No other follow-up for BMI required.       Physical Exam  Vitals reviewed.   Constitutional:       General: He is not in acute distress.     Appearance: Normal appearance. He is well-developed.   Cardiovascular:      Rate and " Rhythm: Normal rate and regular rhythm.      Heart sounds: Normal heart sounds.   Pulmonary:      Effort: Pulmonary effort is normal.      Breath sounds: Normal breath sounds.   Musculoskeletal:      Right lower leg: No edema.      Left lower leg: No edema.   Skin:     General: Skin is warm and dry.   Neurological:      General: No focal deficit present.      Mental Status: He is alert.   Psychiatric:         Attention and Perception: Attention normal.         Mood and Affect: Mood and affect normal.         Behavior: Behavior normal.         Current Outpatient Medications:     aspirin 325 MG EC tablet, Take 1 tablet by mouth Daily., Disp: 30 tablet, Rfl: 0    atorvastatin (LIPITOR) 80 MG tablet, Take 1 tablet by mouth Every Night., Disp: 90 tablet, Rfl: 0    clopidogrel (PLAVIX) 75 MG tablet, Take 1 tablet by mouth Daily., Disp: 30 tablet, Rfl: 0    docusate sodium 100 MG capsule, Take 1 capsule by mouth 2 (Two) Times a Day., Disp: 30 capsule, Rfl: 0    pantoprazole (PROTONIX) 40 MG EC tablet, Take 1 tablet by mouth Every Morning., Disp: 30 tablet, Rfl: 0    terazosin (HYTRIN) 2 MG capsule, Take 1 capsule by mouth Every Night. (Patient taking differently: Take 1 capsule by mouth Daily.), Disp: 90 capsule, Rfl: 1   Past Medical History:   Diagnosis Date    Hyperlipidemia     Prostate cancer     Squamous cell carcinoma of head and neck      Allergies   Allergen Reactions    Penicillins Unknown - Low Severity           Result Review :     CMP          7/30/2023    05:01 7/31/2023    06:09 8/1/2023    05:29   CMP   Glucose 106  99  106    BUN 20  16  14    Creatinine 0.78  0.80  0.80    EGFR 86.9  86.2  86.2    Sodium 139  139  136    Potassium 3.8  3.8  3.9    Chloride 105  106  103    Calcium 9.5  9.2  9.1    Total Protein 6.6  6.0  5.8    Albumin 4.2  3.8  3.3    Globulin 2.4  2.2  2.5    Total Bilirubin 0.4  0.4  0.7    Alkaline Phosphatase 111  108  95    AST (SGOT) 23  19  16    ALT (SGPT) 21  23  19     Albumin/Globulin Ratio 1.8  1.7  1.3    BUN/Creatinine Ratio 25.6  20.0  17.5    Anion Gap 11.0  10.6  10.2      CBC          7/30/2023    05:01 7/31/2023    06:09 7/31/2023    15:47 8/1/2023    05:29   CBC   WBC 6.41  4.88   6.58    RBC 3.81  3.61   3.38    Hemoglobin 13.0  12.2  11.9  11.3    Hematocrit 38.1  35.8  35  32.8    .0  99.2   97.0    MCH 34.1  33.8   33.4    MCHC 34.1  34.1   34.5    RDW 12.1  12.2   11.9    Platelets 222  206   195      CBC w/diff          7/30/2023    05:01 7/31/2023    06:09 7/31/2023    15:47 8/1/2023    05:29   CBC w/Diff   WBC 6.41  4.88   6.58    RBC 3.81  3.61   3.38    Hemoglobin 13.0  12.2  11.9  11.3    Hematocrit 38.1  35.8  35  32.8    .0  99.2   97.0    MCH 34.1  33.8   33.4    MCHC 34.1  34.1   34.5    RDW 12.1  12.2   11.9    Platelets 222  206   195      Lipid Panel          7/25/2023    15:03   Lipid Panel   Total Cholesterol 127    Triglycerides 47    HDL Cholesterol 49    VLDL Cholesterol 11    LDL Cholesterol  67    LDL/HDL Ratio 1.40      TSH          7/25/2023    15:03 7/31/2023    06:09   TSH   TSH 2.950  6.860      Most Recent A1C          7/25/2023    15:03   HGBA1C Most Recent   Hemoglobin A1C 5.60               Assessment and Plan    Diagnoses and all orders for this visit:    1. Internal carotid artery stent present (Primary)  Assessment & Plan:  Follow with vascular as scheduled.      2. Left upper lobe pulmonary nodule  -     CT Chest With Contrast; Future    3. Ischemic stroke  Assessment & Plan:  Along with [facility] stroke clinic as recommended in 3 months      4. Former smoker  Assessment & Plan:  Repeat chest CT is recommended in 8 months    Orders:  -     CT Chest With Contrast; Future    5. Elevated TSH  Assessment & Plan:  Recheck labs in September.  Further treatment pending lab results.    Orders:  -     Thyroid Panel With TSH; Future  -     Thyroid Antibodies; Future    6. Acute thyroiditis  -     Thyroid Panel With TSH;  Future        Follow Up    Return in about 3 months (around 11/11/2023).  Patient was given instructions and counseling regarding his condition or for health maintenance advice. Please see specific information pulled into the AVS if appropriate.

## 2023-08-14 ENCOUNTER — LAB REQUISITION (OUTPATIENT)
Dept: LAB | Facility: HOSPITAL | Age: 88
End: 2023-08-14
Payer: MEDICARE

## 2023-08-14 ENCOUNTER — TELEPHONE (OUTPATIENT)
Dept: FAMILY MEDICINE CLINIC | Age: 88
End: 2023-08-14
Payer: MEDICARE

## 2023-08-14 DIAGNOSIS — E86.0 DEHYDRATION: ICD-10-CM

## 2023-08-14 DIAGNOSIS — I95.9 HYPOTENSION, UNSPECIFIED: ICD-10-CM

## 2023-08-14 LAB
BILIRUB UR QL STRIP: NEGATIVE
CLARITY UR: CLEAR
COLOR UR: YELLOW
GLUCOSE UR STRIP-MCNC: NEGATIVE MG/DL
HGB UR QL STRIP.AUTO: NEGATIVE
KETONES UR QL STRIP: NEGATIVE
LEUKOCYTE ESTERASE UR QL STRIP.AUTO: NEGATIVE
NITRITE UR QL STRIP: NEGATIVE
PH UR STRIP.AUTO: 5.5 [PH] (ref 5–8)
PROT UR QL STRIP: NEGATIVE
SP GR UR STRIP: 1.02 (ref 1–1.03)
UROBILINOGEN UR QL STRIP: NORMAL

## 2023-08-14 PROCEDURE — 81003 URINALYSIS AUTO W/O SCOPE: CPT | Performed by: FAMILY MEDICINE

## 2023-08-14 NOTE — TELEPHONE ENCOUNTER
Received call from home health nurse Eileen Gabriel with [facility].  She is seeing pt today and his bp is 98/52, pulse 50.  Pt is not on any bp meds.  She says he is eating ok, but not drinking much.  I spoke to Dr. Watson on call and she gave orders to check ua, have wife push fluids and gave ER precautions, and go back out tomorrow to check bp again.

## 2023-08-14 NOTE — TELEPHONE ENCOUNTER
Eileen called back stating that pt has an appt tomorrow w/Vascular MD so she wont be able to see him tomorrow.  She did however, get his urine today.

## 2023-08-15 ENCOUNTER — OUTSIDE FACILITY SERVICE (OUTPATIENT)
Dept: FAMILY MEDICINE CLINIC | Age: 88
End: 2023-08-15
Payer: MEDICARE

## 2023-08-24 ENCOUNTER — TELEPHONE (OUTPATIENT)
Dept: NEUROLOGY | Facility: CLINIC | Age: 88
End: 2023-08-24
Payer: MEDICARE

## 2023-08-24 NOTE — TELEPHONE ENCOUNTER
Spoke to wife, she states that Eileen has not been out for a while, but the PT has been coming and his bp is back to normal.  I spoke to him as well and they both report that pt is doing well.  She says he is ready to get out but with the heat, he hasn't been able to.  She says thank you so much for checking in on him.

## 2023-08-24 NOTE — TELEPHONE ENCOUNTER
"Spoke w/ pt's wife to schedule hospital stroke fu. Wife informed me that they cancelled the appt once already & that they would call to make the appointment. Spouse claims that pt \"is not ready for all that, yet\". And for the time being home health and therapy is all they will be pursuing.   "

## 2023-08-31 DIAGNOSIS — I63.9 ISCHEMIC STROKE: Primary | ICD-10-CM

## 2023-08-31 RX ORDER — CLOPIDOGREL BISULFATE 75 MG/1
75 TABLET ORAL DAILY
Qty: 30 TABLET | Refills: 1 | Status: SHIPPED | OUTPATIENT
Start: 2023-08-31 | End: 2023-09-01 | Stop reason: SDUPTHER

## 2023-09-01 ENCOUNTER — DOCUMENTATION (OUTPATIENT)
Dept: FAMILY MEDICINE CLINIC | Age: 88
End: 2023-09-01
Payer: MEDICARE

## 2023-09-01 DIAGNOSIS — I63.9 ISCHEMIC STROKE: Primary | ICD-10-CM

## 2023-09-01 RX ORDER — CLOPIDOGREL BISULFATE 75 MG/1
75 TABLET ORAL DAILY
Qty: 30 TABLET | Refills: 1 | Status: SHIPPED | OUTPATIENT
Start: 2023-09-01 | End: 2023-09-01 | Stop reason: SDUPTHER

## 2023-09-01 RX ORDER — CLOPIDOGREL BISULFATE 75 MG/1
75 TABLET ORAL DAILY
Qty: 15 TABLET | Refills: 0 | Status: SHIPPED | OUTPATIENT
Start: 2023-09-01 | End: 2023-09-05 | Stop reason: SDUPTHER

## 2023-09-01 NOTE — PROGRESS NOTES
I am on call tonight for the [facility] and Magan's daughter called stating he is out of his Plavix so I sent 15 pills to [facility] pharmacy. Dr. Watson

## 2023-09-05 ENCOUNTER — TELEPHONE (OUTPATIENT)
Dept: FAMILY MEDICINE CLINIC | Age: 88
End: 2023-09-05
Payer: MEDICARE

## 2023-09-05 DIAGNOSIS — I63.9 ISCHEMIC STROKE: ICD-10-CM

## 2023-09-05 RX ORDER — CLOPIDOGREL BISULFATE 75 MG/1
75 TABLET ORAL DAILY
Qty: 15 TABLET | Refills: 0 | Status: SHIPPED | OUTPATIENT
Start: 2023-09-05 | End: 2023-09-06 | Stop reason: SDUPTHER

## 2023-09-05 NOTE — PROGRESS NOTES
CC: Stroke follow-up    HPI:  Magan Lubin is a  88 y.o.  right-handed male, former smoker, with history of carotid stenosis, prostate cancer, and hyperlipidemia who is being seen in follow-up today for stroke. He is accompanied by his daughter Regine who is a nurse.       7/25/2023 the patient presented to [facility] because he was not feeling well.  He had woken up to go to the bathroom and had left-sided weakness and numbness and fell.  No previous history of stroke or TIA.  He was taking aspirin 81 mg daily and Lipitor 10 mg daily prior to admission.  CTA head/neck showed 50% left ICA stenosis and 70% stenosis at the right ICA origin as well as 60% focal stenosis of the left ICA.  MRI brain showed scattered strokes in the right cerebral hemisphere.  MRI cervical spine without was unremarkable, showed degenerative changes but no acute findings or abnormal cord signal.  2D echo was unremarkable.  Hemoglobin A1c was 5.6%, LDL was 67.   vascular surgery was consulted and he ultimately underwent right TCAR on 7/31/2023.  Aspirin was increased to 325 mg and Plavix 75 mg daily was added, Lipitor was increased to 80 mg daily.  Per discharge summary was recommended that he stay on DAPT for at least 3 months.  He is on Protonix 40 mg daily as well.     While he was hospitalized there were also concerns for memory issues. B12 level was 412  Folate was 6.8, TSH was initially normal at 2.95 but was later checked and was elevated at 6.8.  His PCP is following up on elevated TSH with additional labs.    No new or worsening TIA/stroke symptoms since his discharge.  He is till noticing some weakness in his left arm. He was released from  PT/OT.  He is accompanied by a nurse .  Daughter notes memory is worse since his stroke.  She did not really notice any memory issues prior to his stroke.  He scored 12 out of 30 on the [place] but had difficulty completing this due to interruptions.  He is also shuffling more  since his stroke, using a walker now.  He has not had any issues while driving however daughter reports he does not drive much and stays very close to his home in [place].  Has not had any accidents or gotten lost while driving.    He is currently taking aspirin 81 mg daily, Plavix 75 mg daily, and Lipitor 80 mg daily.  They have seen Dr. Kendrick in follow-up and there was a question whether he should be on DAPT or monotherapy.    The patient lives at home with his wife.  He does not drink.  He does not smoke any longer.      Past Medical History:   Diagnosis Date    Hyperlipidemia     Prostate cancer     Squamous cell carcinoma of head and neck          Past Surgical History:   Procedure Laterality Date    APPENDECTOMY      HERNIA REPAIR      KNEE ARTHROPLASTY, PARTIAL REPLACEMENT Bilateral            Current Outpatient Medications:     aspirin 81 MG EC tablet, Take 1 tablet by mouth Daily., Disp: , Rfl:     atorvastatin (LIPITOR) 80 MG tablet, Take 1 tablet by mouth Every Night., Disp: 90 tablet, Rfl: 0    clopidogrel (PLAVIX) 75 MG tablet, Take 1 tablet by mouth Daily., Disp: 90 tablet, Rfl: 1    docusate sodium 100 MG capsule, Take 1 capsule by mouth 2 (Two) Times a Day., Disp: 30 capsule, Rfl: 0    pantoprazole (PROTONIX) 40 MG EC tablet, Take 1 tablet by mouth Every Morning., Disp: 30 tablet, Rfl: 0    terazosin (HYTRIN) 2 MG capsule, Take 1 capsule by mouth Every Night. (Patient taking differently: Take 1 capsule by mouth Daily.), Disp: 90 capsule, Rfl: 1      History reviewed. No pertinent family history.      Social History     Socioeconomic History    Marital status:    Tobacco Use    Smoking status: Former     Packs/day: 1.00     Years: 58.00     Pack years: 58.00     Types: Cigarettes     Start date: 1955     Quit date: 2013     Years since quitting: 10.6    Smokeless tobacco: Never   Vaping Use    Vaping Use: Never used   Substance and Sexual Activity    Alcohol use: Yes     Comment:  "occasional    Drug use: Defer    Sexual activity: Defer         Allergies   Allergen Reactions    Penicillins Unknown - Low Severity         Pain Scale:        ROS:  Review of Systems   HENT:  Negative for trouble swallowing.    Eyes:  Negative for photophobia, pain and visual disturbance.   Respiratory:  Negative for choking, chest tightness, shortness of breath and wheezing.    Musculoskeletal:  Positive for gait problem.   Neurological:  Positive for weakness (legs & drags feet). Negative for tremors, numbness and headaches.   Psychiatric/Behavioral:  Positive for confusion and decreased concentration. Negative for sleep disturbance.    ROS completed with MA was reviewed by me and agree.    Physical Exam:  Vitals:    09/06/23 0824   BP: 108/68   Pulse: 64   SpO2: 96%   Weight: 76.2 kg (168 lb)   Height: 182.9 cm (72\")     Orthostatic BP:    Body mass index is 22.78 kg/m².    General appearance: Well developed, well nourished, well groomed, alert and cooperative.   HEENT: Normocephalic.   Cardiac: Regular rate and rhythm.   Peripheral Vasculature: Radial pulses are equal and symmetric.  Chest Exam: Clear to auscultation bilaterally, no wheezes, no rhonchi.  Extremities: Normal, no edema.   Skin: No rashes or birthmarks.     Higher integrative function: Awake and alert oriented to 6 to the place and city the [place].  Scored 12 out of 30 on the [place].  Impaired recent/remote memory, attention/concentration.  Speech fluent, no obvious neglect.  CN II: Normal visual acuity and visual fields.   CN III IV VI: Extraocular movements are full without nystagmus. Pupils are equal, round, and reactive to light.   CN V: Normal facial sensation.  CN VII: Facial movements are symmetric, no weakness.   CN VIII: Auditory acuity is normal.   CN IX & X: Symmetric palatal movement.   CN XI: Sternocleidomastoid and trapezius are normal. No weakness.   CN XII: The tongue is midline. No atrophy or fasciculations.   Motor: Normal muscle " strength, bulk, and tone in upper and lower extremities. No fasciculations, rigidity, spasticity or abnormal movements.   Sensation: Intact/symmetric to light touch in arms and legs.  Station and gait: Comes to stand by pushing up with his arms, using rolling walker, shortened step length.  Coordination: Finger to nose test showed no dysmetria.       Results:      Lab Results   Component Value Date    GLUCOSE 106 (H) 08/01/2023    BUN 14 08/01/2023    CREATININE 0.80 08/01/2023    BCR 17.5 08/01/2023    CO2 22.8 08/01/2023    CALCIUM 9.1 08/01/2023    ALBUMIN 3.3 (L) 08/01/2023    LABIL2 1.4 06/02/2021    AST 16 08/01/2023    ALT 19 08/01/2023       Lab Results   Component Value Date    WBC 6.58 08/01/2023    HGB 11.3 (L) 08/01/2023    HCT 32.8 (L) 08/01/2023    MCV 97.0 08/01/2023     08/01/2023         .No results found for: RPR      Lab Results   Component Value Date    TSH 6.860 (H) 07/31/2023         Lab Results   Component Value Date    [id number] 412 07/25/2023         Lab Results   Component Value Date    FOLATE 6.80 07/25/2023         Lab Results   Component Value Date    HGBA1C 5.60 07/25/2023         Lab Results   Component Value Date    GLUCOSE 106 (H) 08/01/2023    BUN 14 08/01/2023    CREATININE 0.80 08/01/2023    BCR 17.5 08/01/2023    K 3.9 08/01/2023    CO2 22.8 08/01/2023    CALCIUM 9.1 08/01/2023    ALBUMIN 3.3 (L) 08/01/2023    LABIL2 1.4 06/02/2021    AST 16 08/01/2023    ALT 19 08/01/2023         Lab Results   Component Value Date    WBC 6.58 08/01/2023    HGB 11.3 (L) 08/01/2023    HCT 32.8 (L) 08/01/2023    MCV 97.0 08/01/2023     08/01/2023             Assessment/Plan:     Diagnoses and all orders for this visit:    1. History of stroke in prior 3 months (Primary)  -     clopidogrel (PLAVIX) 75 MG tablet; Take 1 tablet by mouth Daily.  Dispense: 90 tablet; Refill: 1    2. Memory loss     Given his recent TCAR with stent we will continue DAPT with aspirin 81 mg daily x75 mg  daily.  Plavix refilled today.  We will reach out to Dr Kendrick to get his recommendations on continuing DAPT long-term.  Blood pressure control to <130/80  Goal LDL <70-recommend high dose statins-continue Lipitor 80 mg daily, patient denies any side effects    Serum glucose < 140   Recommend normal TSH and B12 400   Call 911 for stroke any stroke symptoms    Regarding his memory symptoms, some of this may have been present prior to his stroke but is certainly worse since his stroke.  Abnormal TSH levels may be contributing.  Patient's daughter to discuss with patient's wife about his driving and to monitor closely, recommend ceasing driving if he gets lost, goes outside of this short radius close to home, or has any MVAs even if minor.    I have recommended he increase to social interaction, games to help cognition such as puzzles, word searches, or reading, and increased physical activity which can help his memory.  I do not recommend starting medication for memory at this time.    He was tearful during his exam today.  We discussed increased incidence of depression after stroke.  He declined medication at this time.     Follow-up in 6 months or sooner if needed      Time: Greater than 40 minutes            Dictated utilizing Dragon dictation.

## 2023-09-05 NOTE — TELEPHONE ENCOUNTER
Pt called on 9/2/23 about a medication that did not get sent to [facility]. Please send in to pharmacy, no medication name was left in message

## 2023-09-06 ENCOUNTER — OFFICE VISIT (OUTPATIENT)
Dept: NEUROLOGY | Facility: CLINIC | Age: 88
End: 2023-09-06
Payer: MEDICARE

## 2023-09-06 VITALS
HEIGHT: 72 IN | HEART RATE: 64 BPM | DIASTOLIC BLOOD PRESSURE: 68 MMHG | WEIGHT: 168 LBS | BODY MASS INDEX: 22.75 KG/M2 | SYSTOLIC BLOOD PRESSURE: 108 MMHG | OXYGEN SATURATION: 96 %

## 2023-09-06 DIAGNOSIS — R41.3 MEMORY LOSS: ICD-10-CM

## 2023-09-06 DIAGNOSIS — Z86.73 HISTORY OF STROKE IN PRIOR 3 MONTHS: Primary | ICD-10-CM

## 2023-09-06 RX ORDER — CLOPIDOGREL BISULFATE 75 MG/1
75 TABLET ORAL DAILY
Qty: 90 TABLET | Refills: 1 | Status: SHIPPED | OUTPATIENT
Start: 2023-09-06

## 2023-09-06 RX ORDER — ASPIRIN 81 MG/1
81 TABLET ORAL DAILY
COMMUNITY

## 2023-09-06 NOTE — LETTER
September 6, 2023       No Recipients    Patient: Magan Lubin   YOB: 1935   Date of Visit: 9/6/2023     Dear TYRONE Chacon:       Thank you for referring Magan Lubin to me for evaluation. Below are the relevant portions of my assessment and plan of care.    If you have questions, please do not hesitate to call me. I look forward to following Magan along with you.         Sincerely,        TYRONE Chambers        CC:   No Recipients    Molly Friend APRN  09/06/23 0925  Sign when Signing Visit  CC: Stroke follow-up    HPI:  Magan Lubin is a  88 y.o.  right-handed male, former smoker, with history of carotid stenosis, prostate cancer, and hyperlipidemia who is being seen in follow-up today for stroke. He is accompanied by his daughter Regine who is a nurse.       7/25/2023 the patient presented to [facility] because he was not feeling well.  He had woken up to go to the bathroom and had left-sided weakness and numbness and fell.  No previous history of stroke or TIA.  He was taking aspirin 81 mg daily and Lipitor 10 mg daily prior to admission.  CTA head/neck showed 50% left ICA stenosis and 70% stenosis at the right ICA origin as well as 60% focal stenosis of the left ICA.  MRI brain showed scattered strokes in the right cerebral hemisphere.  MRI cervical spine without was unremarkable, showed degenerative changes but no acute findings or abnormal cord signal.  2D echo was unremarkable.  Hemoglobin A1c was 5.6%, LDL was 67.   vascular surgery was consulted and he ultimately underwent right TCAR on 7/31/2023.  Aspirin was increased to 325 mg and Plavix 75 mg daily was added, Lipitor was increased to 80 mg daily.  Per discharge summary was recommended that he stay on DAPT for at least 3 months.  He is on Protonix 40 mg daily as well.     While he was hospitalized there were also concerns for memory issues. B12 level was 412  Folate was 6.8, TSH was initially  normal at 2.95 but was later checked and was elevated at 6.8.  His PCP is following up on elevated TSH with additional labs.    No new or worsening TIA/stroke symptoms since his discharge.  He is till noticing some weakness in his left arm. He was released from  PT/OT.  He is accompanied by a nurse .  Daughter notes memory is worse since his stroke.  She did not really notice any memory issues prior to his stroke.  He scored 12 out of 30 on the [place] but had difficulty completing this due to interruptions.  He is also shuffling more since his stroke, using a walker now.  He has not had any issues while driving however daughter reports he does not drive much and stays very close to his home in [place].  Has not had any accidents or gotten lost while driving.    He is currently taking aspirin 81 mg daily, Plavix 75 mg daily, and Lipitor 80 mg daily.  They have seen Dr. Kendrick in follow-up and there was a question whether he should be on DAPT or monotherapy.    The patient lives at home with his wife.  He does not drink.  He does not smoke any longer.      Past Medical History:   Diagnosis Date   • Hyperlipidemia    • Prostate cancer    • Squamous cell carcinoma of head and neck          Past Surgical History:   Procedure Laterality Date   • APPENDECTOMY     • HERNIA REPAIR     • KNEE ARTHROPLASTY, PARTIAL REPLACEMENT Bilateral            Current Outpatient Medications:   •  aspirin 81 MG EC tablet, Take 1 tablet by mouth Daily., Disp: , Rfl:   •  atorvastatin (LIPITOR) 80 MG tablet, Take 1 tablet by mouth Every Night., Disp: 90 tablet, Rfl: 0  •  clopidogrel (PLAVIX) 75 MG tablet, Take 1 tablet by mouth Daily., Disp: 90 tablet, Rfl: 1  •  docusate sodium 100 MG capsule, Take 1 capsule by mouth 2 (Two) Times a Day., Disp: 30 capsule, Rfl: 0  •  pantoprazole (PROTONIX) 40 MG EC tablet, Take 1 tablet by mouth Every Morning., Disp: 30 tablet, Rfl: 0  •  terazosin (HYTRIN) 2 MG capsule, Take 1 capsule by mouth Every  "Night. (Patient taking differently: Take 1 capsule by mouth Daily.), Disp: 90 capsule, Rfl: 1      History reviewed. No pertinent family history.      Social History     Socioeconomic History   • Marital status:    Tobacco Use   • Smoking status: Former     Packs/day: 1.00     Years: 58.00     Pack years: 58.00     Types: Cigarettes     Start date: 1955     Quit date: 2013     Years since quitting: 10.6   • Smokeless tobacco: Never   Vaping Use   • Vaping Use: Never used   Substance and Sexual Activity   • Alcohol use: Yes     Comment: occasional   • Drug use: Defer   • Sexual activity: Defer         Allergies   Allergen Reactions   • Penicillins Unknown - Low Severity         Pain Scale:        ROS:  Review of Systems   HENT:  Negative for trouble swallowing.    Eyes:  Negative for photophobia, pain and visual disturbance.   Respiratory:  Negative for choking, chest tightness, shortness of breath and wheezing.    Musculoskeletal:  Positive for gait problem.   Neurological:  Positive for weakness (legs & drags feet). Negative for tremors, numbness and headaches.   Psychiatric/Behavioral:  Positive for confusion and decreased concentration. Negative for sleep disturbance.    ROS completed with MA was reviewed by me and agree.    Physical Exam:  Vitals:    09/06/23 0824   BP: 108/68   Pulse: 64   SpO2: 96%   Weight: 76.2 kg (168 lb)   Height: 182.9 cm (72\")     Orthostatic BP:    Body mass index is 22.78 kg/m².    General appearance: Well developed, well nourished, well groomed, alert and cooperative.   HEENT: Normocephalic.   Cardiac: Regular rate and rhythm.   Peripheral Vasculature: Radial pulses are equal and symmetric.  Chest Exam: Clear to auscultation bilaterally, no wheezes, no rhonchi.  Extremities: Normal, no edema.   Skin: No rashes or birthmarks.     Higher integrative function: Awake and alert oriented to 6 to the place and city the [place].  Scored 12 out of 30 on the [place].  Impaired recent/remote " memory, attention/concentration.  Speech fluent, no obvious neglect.  CN II: Normal visual acuity and visual fields.   CN III IV VI: Extraocular movements are full without nystagmus. Pupils are equal, round, and reactive to light.   CN V: Normal facial sensation.  CN VII: Facial movements are symmetric, no weakness.   CN VIII: Auditory acuity is normal.   CN IX & X: Symmetric palatal movement.   CN XI: Sternocleidomastoid and trapezius are normal. No weakness.   CN XII: The tongue is midline. No atrophy or fasciculations.   Motor: Normal muscle strength, bulk, and tone in upper and lower extremities. No fasciculations, rigidity, spasticity or abnormal movements.   Sensation: Intact/symmetric to light touch in arms and legs.  Station and gait: Comes to stand by pushing up with his arms, using rolling walker, shortened step length.  Coordination: Finger to nose test showed no dysmetria.       Results:      Lab Results   Component Value Date    GLUCOSE 106 (H) 08/01/2023    BUN 14 08/01/2023    CREATININE 0.80 08/01/2023    BCR 17.5 08/01/2023    CO2 22.8 08/01/2023    CALCIUM 9.1 08/01/2023    ALBUMIN 3.3 (L) 08/01/2023    LABIL2 1.4 06/02/2021    AST 16 08/01/2023    ALT 19 08/01/2023       Lab Results   Component Value Date    WBC 6.58 08/01/2023    HGB 11.3 (L) 08/01/2023    HCT 32.8 (L) 08/01/2023    MCV 97.0 08/01/2023     08/01/2023         .No results found for: RPR      Lab Results   Component Value Date    TSH 6.860 (H) 07/31/2023         Lab Results   Component Value Date    [id number] 412 07/25/2023         Lab Results   Component Value Date    FOLATE 6.80 07/25/2023         Lab Results   Component Value Date    HGBA1C 5.60 07/25/2023         Lab Results   Component Value Date    GLUCOSE 106 (H) 08/01/2023    BUN 14 08/01/2023    CREATININE 0.80 08/01/2023    BCR 17.5 08/01/2023    K 3.9 08/01/2023    CO2 22.8 08/01/2023    CALCIUM 9.1 08/01/2023    ALBUMIN 3.3 (L) 08/01/2023    LABIL2 1.4 06/02/2021     AST 16 08/01/2023    ALT 19 08/01/2023         Lab Results   Component Value Date    WBC 6.58 08/01/2023    HGB 11.3 (L) 08/01/2023    HCT 32.8 (L) 08/01/2023    MCV 97.0 08/01/2023     08/01/2023             Assessment/Plan:     Diagnoses and all orders for this visit:    1. History of stroke in prior 3 months (Primary)  -     clopidogrel (PLAVIX) 75 MG tablet; Take 1 tablet by mouth Daily.  Dispense: 90 tablet; Refill: 1    2. Memory loss     Given his recent TCAR with stent we will continue DAPT with aspirin 81 mg daily x75 mg daily.  Plavix refilled today.  We will reach out to Dr Kendrick to get his recommendations on continuing DAPT long-term.  Blood pressure control to <130/80  Goal LDL <70-recommend high dose statins-continue Lipitor 80 mg daily, patient denies any side effects    Serum glucose < 140   Recommend normal TSH and B12 400   Call 911 for stroke any stroke symptoms    Regarding his memory symptoms, some of this may have been present prior to his stroke but is certainly worse since his stroke.  Abnormal TSH levels may be contributing.  Patient's daughter to discuss with patient's wife about his driving and to monitor closely, recommend ceasing driving if he gets lost, goes outside of this short radius close to home, or has any MVAs even if minor.    I have recommended he increase to social interaction, games to help cognition such as puzzles, word searches, or reading, and increased physical activity which can help his memory.  I do not recommend starting medication for memory at this time.    He was tearful during his exam today.  We discussed increased incidence of depression after stroke.  He declined medication at this time.     Follow-up in 6 months or sooner if needed      Time: Greater than 40 minutes            Dictated utilizing Dragon dictation.

## 2023-09-13 DIAGNOSIS — N40.0 BENIGN PROSTATIC HYPERPLASIA WITHOUT LOWER URINARY TRACT SYMPTOMS: ICD-10-CM

## 2023-09-14 RX ORDER — TERAZOSIN 2 MG/1
2 CAPSULE ORAL DAILY
Qty: 90 CAPSULE | Refills: 1 | Status: SHIPPED | OUTPATIENT
Start: 2023-09-14

## 2023-09-15 ENCOUNTER — LAB (OUTPATIENT)
Dept: LAB | Facility: HOSPITAL | Age: 88
End: 2023-09-15
Payer: MEDICARE

## 2023-09-15 DIAGNOSIS — E06.0 ACUTE THYROIDITIS: ICD-10-CM

## 2023-09-15 DIAGNOSIS — R79.89 ELEVATED TSH: ICD-10-CM

## 2023-09-15 LAB
T-UPTAKE NFR SERPL: 1.1 TBI (ref 0.8–1.3)
T4 SERPL-MCNC: 7.92 MCG/DL (ref 4.5–11.7)
TSH SERPL DL<=0.05 MIU/L-ACNC: 4.82 UIU/ML (ref 0.27–4.2)

## 2023-09-15 PROCEDURE — 84443 ASSAY THYROID STIM HORMONE: CPT

## 2023-09-15 PROCEDURE — 84436 ASSAY OF TOTAL THYROXINE: CPT

## 2023-09-15 PROCEDURE — 36415 COLL VENOUS BLD VENIPUNCTURE: CPT

## 2023-09-15 PROCEDURE — 86376 MICROSOMAL ANTIBODY EACH: CPT

## 2023-09-15 PROCEDURE — 84479 ASSAY OF THYROID (T3 OR T4): CPT

## 2023-09-15 PROCEDURE — 86800 THYROGLOBULIN ANTIBODY: CPT

## 2023-09-15 RX ORDER — PANTOPRAZOLE SODIUM 40 MG/1
40 TABLET, DELAYED RELEASE ORAL
Qty: 30 TABLET | Refills: 0 | Status: SHIPPED | OUTPATIENT
Start: 2023-09-15

## 2023-09-18 LAB
THYROGLOB AB SERPL-ACNC: <1 IU/ML (ref 0–0.9)
THYROPEROXIDASE AB SERPL-ACNC: 14 IU/ML (ref 0–34)

## 2023-09-20 NOTE — PROGRESS NOTES
You do not have any autoimmune cause to a thyroid condition.  Thyroid-stimulating hormone level is lower than it was July 31.  This could be related to resolving thyroiditis or thyroid gland inflammation.  Option one  is  to repeat the level again in 2 months and treat if the level remains elevated.   option two would be to start thyroid medication if you are very symptomatic with fatigue, unexplained weight gain, feeling cold very frequently, constipation, or dry skin.  Please let me know how you would like to proceed

## 2023-10-16 ENCOUNTER — TELEPHONE (OUTPATIENT)
Dept: UROLOGY | Facility: CLINIC | Age: 88
End: 2023-10-16

## 2023-10-17 RX ORDER — PANTOPRAZOLE SODIUM 40 MG/1
40 TABLET, DELAYED RELEASE ORAL
Qty: 90 TABLET | Refills: 0 | Status: SHIPPED | OUTPATIENT
Start: 2023-10-17

## 2023-10-24 DIAGNOSIS — E06.9 THYROIDITIS, UNSPECIFIED: ICD-10-CM

## 2023-10-24 DIAGNOSIS — R79.89 ELEVATED TSH: Primary | ICD-10-CM

## 2023-11-09 ENCOUNTER — TELEPHONE (OUTPATIENT)
Dept: FAMILY MEDICINE CLINIC | Age: 88
End: 2023-11-09
Payer: MEDICARE

## 2023-11-09 RX ORDER — ATORVASTATIN CALCIUM 80 MG/1
80 TABLET, FILM COATED ORAL NIGHTLY
Qty: 90 TABLET | Refills: 0 | Status: SHIPPED | OUTPATIENT
Start: 2023-11-09

## 2023-11-09 NOTE — TELEPHONE ENCOUNTER
Caller: TE WEBB    Relationship: Emergency Contact    Best call back number:  [phone]     What is the best time to reach you: ANYTIME     Who are you requesting to speak with (clinical staff, provider,  specific staff member): CLINICAL       What was the call regarding: PATIENT SPOUSE IS CALLING IN REGARDS TO A MEDICATION ,AND REQUESTING TO SPEAK WITH THE NURSE, TO MAKE SURE THAT THE MEDICATION WAS SPOKEN ABOUT FOR FUTURE REFILLS.

## 2023-11-09 NOTE — TELEPHONE ENCOUNTER
Pt got Atorvastatin from the hospitalist and pts wife was asking for a refill.  Sent to [facility].

## 2023-11-28 ENCOUNTER — LAB (OUTPATIENT)
Dept: LAB | Facility: HOSPITAL | Age: 88
End: 2023-11-28
Payer: MEDICARE

## 2023-11-28 ENCOUNTER — OFFICE VISIT (OUTPATIENT)
Dept: FAMILY MEDICINE CLINIC | Age: 88
End: 2023-11-28
Payer: MEDICARE

## 2023-11-28 VITALS
BODY MASS INDEX: 23.57 KG/M2 | WEIGHT: 174 LBS | HEART RATE: 63 BPM | SYSTOLIC BLOOD PRESSURE: 115 MMHG | OXYGEN SATURATION: 95 % | HEIGHT: 72 IN | DIASTOLIC BLOOD PRESSURE: 71 MMHG

## 2023-11-28 DIAGNOSIS — Z86.73 HISTORY OF STROKE: ICD-10-CM

## 2023-11-28 DIAGNOSIS — E06.9 THYROIDITIS, UNSPECIFIED: ICD-10-CM

## 2023-11-28 DIAGNOSIS — R53.1 GENERALIZED WEAKNESS: ICD-10-CM

## 2023-11-28 DIAGNOSIS — R79.89 ELEVATED TSH: ICD-10-CM

## 2023-11-28 DIAGNOSIS — K21.9 GASTROESOPHAGEAL REFLUX DISEASE WITHOUT ESOPHAGITIS: ICD-10-CM

## 2023-11-28 DIAGNOSIS — R91.1 LEFT UPPER LOBE PULMONARY NODULE: ICD-10-CM

## 2023-11-28 DIAGNOSIS — Z23 NEED FOR INFLUENZA VACCINATION: Primary | ICD-10-CM

## 2023-11-28 PROBLEM — C61 PROSTATE CANCER: Status: RESOLVED | Noted: 2022-12-05 | Resolved: 2023-11-28

## 2023-11-28 LAB
T4 FREE SERPL-MCNC: 1.08 NG/DL (ref 0.93–1.7)
TSH SERPL DL<=0.05 MIU/L-ACNC: 5.02 UIU/ML (ref 0.27–4.2)

## 2023-11-28 PROCEDURE — 84439 ASSAY OF FREE THYROXINE: CPT

## 2023-11-28 PROCEDURE — 84443 ASSAY THYROID STIM HORMONE: CPT

## 2023-11-28 PROCEDURE — 36415 COLL VENOUS BLD VENIPUNCTURE: CPT

## 2023-11-28 RX ORDER — PANTOPRAZOLE SODIUM 40 MG/1
40 TABLET, DELAYED RELEASE ORAL
Qty: 90 TABLET | Refills: 0 | Status: SHIPPED | OUTPATIENT
Start: 2023-11-28

## 2023-11-28 RX ORDER — ATORVASTATIN CALCIUM 80 MG/1
80 TABLET, FILM COATED ORAL NIGHTLY
Qty: 90 TABLET | Refills: 0 | Status: SHIPPED | OUTPATIENT
Start: 2023-11-28

## 2023-11-28 NOTE — PROGRESS NOTES
"Chief Complaint  Ischemic stroke (3 month follow up ), Internal carotid artery stent present, and Elevated TSH    Subjective          Magan Lubin presents to [facility]     Patient is an 88-year-old male who is here today with his wife.  He experienced a stroke in August and recently saw neurology in stroke clinic September 6.  He was receiving physical therapy through [facility] at home with benefit.  Patient does have a walker at home but prefers to move about independently.  He does walk with a slower shuffling gait.  He does have some generalized weakness and memory concerns since the stroke.  Neurology does not feel the need to start on medicines for memory at this time.  Patient and wife would be interested in seeing if [facility] could come to physical therapy in the home after January 1.  He has had a mild elevated TSH level with normal thyroid antibody studies.  He will have his thyroid-stimulating hormone level rechecked today.  He also will have follow-up on a lung nodule in March with lung CT scan.  He also has some carotid artery stenosis at 50%, 60%, and 70%.  They deny any other concerns today.     Objective   Vital Signs:   Vitals:    11/28/23 1459   BP: 115/71   BP Location: Left arm   Patient Position: Sitting   Cuff Size: Adult   Pulse: 63   SpO2: 95%   Weight: 78.9 kg (174 lb)   Height: 182.9 cm (72\")       Wt Readings from Last 3 Encounters:   11/28/23 78.9 kg (174 lb)   09/06/23 76.2 kg (168 lb)   08/11/23 78.9 kg (174 lb)      BP Readings from Last 3 Encounters:   11/28/23 115/71   09/06/23 108/68   08/11/23 116/68       Body mass index is 23.6 kg/m².    BMI is within normal parameters. No other follow-up for BMI required.       Physical Exam  Vitals reviewed.   Constitutional:       General: He is not in acute distress.     Appearance: Normal appearance. He is well-developed.   HENT:      Right Ear: A middle ear effusion is present.      Left Ear: A " middle ear effusion is present.   Neck:      Thyroid: No thyromegaly.      Vascular: No carotid bruit.   Cardiovascular:      Rate and Rhythm: Normal rate and regular rhythm.      Pulses:           Posterior tibial pulses are 2+ on the right side and 2+ on the left side.      Heart sounds: Normal heart sounds.   Pulmonary:      Effort: Pulmonary effort is normal.      Breath sounds: Normal breath sounds.   Musculoskeletal:      Right lower leg: No edema.      Left lower leg: No edema.      Comments: Slow, shuffling gait without tremor   Skin:     General: Skin is warm and dry.   Neurological:      General: No focal deficit present.      Mental Status: He is alert.   Psychiatric:         Attention and Perception: Attention normal.         Mood and Affect: Mood and affect normal.         Behavior: Behavior normal.           Current Outpatient Medications:     aspirin 81 MG EC tablet, Take 1 tablet by mouth Daily., Disp: , Rfl:     atorvastatin (LIPITOR) 80 MG tablet, Take 1 tablet by mouth Every Night., Disp: 90 tablet, Rfl: 0    clopidogrel (PLAVIX) 75 MG tablet, Take 1 tablet by mouth Daily., Disp: 90 tablet, Rfl: 1    docusate sodium 100 MG capsule, Take 1 capsule by mouth 2 (Two) Times a Day., Disp: 30 capsule, Rfl: 0    pantoprazole (PROTONIX) 40 MG EC tablet, Take 1 tablet by mouth Every Morning., Disp: 90 tablet, Rfl: 0    terazosin (HYTRIN) 2 MG capsule, Take 1 capsule by mouth Daily., Disp: 90 capsule, Rfl: 1   Past Medical History:   Diagnosis Date    Hyperlipidemia     Prostate cancer     Squamous cell carcinoma of head and neck      Allergies   Allergen Reactions    Penicillins Unknown - Low Severity               Result Review :     Common labs          7/30/2023    05:01 7/31/2023    06:09 7/31/2023    15:47 8/1/2023    05:29   Common Labs   Glucose 106  99   106    BUN 20  16   14    Creatinine 0.78  0.80   0.80    Sodium 139  139   136    Potassium 3.8  3.8   3.9    Chloride 105  106   103    Calcium  9.5  9.2   9.1    Albumin 4.2  3.8   3.3    Total Bilirubin 0.4  0.4   0.7    Alkaline Phosphatase 111  108   95    AST (SGOT) 23  19   16    ALT (SGPT) 21  23   19    WBC 6.41  4.88   6.58    Hemoglobin 13.0  12.2  11.9  11.3    Hematocrit 38.1  35.8  35  32.8    Platelets 222  206   195         No Images in the past 120 days found..           Social History     Tobacco Use   Smoking Status Former    Packs/day: 1.00    Years: 58.00    Additional pack years: 0.00    Total pack years: 58.00    Types: Cigarettes    Start date: 1955    Quit date: 2013    Years since quitting: 10.9   Smokeless Tobacco Never           Assessment and Plan    Diagnoses and all orders for this visit:    1. Need for influenza vaccination (Primary)  -     Fluzone High-Dose 65+yrs (4770-4643)    2. Elevated TSH  Assessment & Plan:  Recheck labs today.  If TSH remains elevated will start levothyroxine 25 mcg daily.  If TSH is normal we will repeat TSH level in 6 months.      3. Left upper lobe pulmonary nodule  Assessment & Plan:  Have lung imaging in March as scheduled or sooner if he develops any respiratory symptoms      4. Gastroesophageal reflux disease without esophagitis  -     pantoprazole (PROTONIX) 40 MG EC tablet; Take 1 tablet by mouth Every Morning.  Dispense: 90 tablet; Refill: 0    5. History of stroke  Assessment & Plan:  Continue Lipitor and see neurology.    Orders:  -     atorvastatin (LIPITOR) 80 MG tablet; Take 1 tablet by mouth Every Night.  Dispense: 90 tablet; Refill: 0  -     Ambulatory Referral to Home Health    6. Generalized weakness  -     Ambulatory Referral to Home Health        Follow Up    No follow-ups on file.  Patient was given instructions and counseling regarding his condition or for health maintenance advice. Please see specific information pulled into the AVS if appropriate.

## 2023-11-28 NOTE — ASSESSMENT & PLAN NOTE
Recheck labs today.  If TSH remains elevated will start levothyroxine 25 mcg daily.  If TSH is normal we will repeat TSH level in 6 months.

## 2023-11-30 DIAGNOSIS — E03.9 ACQUIRED HYPOTHYROIDISM: Primary | ICD-10-CM

## 2023-11-30 NOTE — PROGRESS NOTES
Please call patient and advise tsh is still milldy elevated.  Recommend starting levothyroxine 25 mcg once daily.  Repeat tsh level in 2 months.

## 2023-12-04 RX ORDER — LEVOTHYROXINE SODIUM 0.03 MG/1
25 TABLET ORAL
Qty: 30 TABLET | Refills: 2 | Status: SHIPPED | OUTPATIENT
Start: 2023-12-04

## 2024-02-06 ENCOUNTER — OFFICE VISIT (OUTPATIENT)
Dept: FAMILY MEDICINE CLINIC | Age: 89
End: 2024-02-06
Payer: MEDICARE

## 2024-02-06 ENCOUNTER — LAB (OUTPATIENT)
Dept: LAB | Facility: HOSPITAL | Age: 89
End: 2024-02-06
Payer: MEDICARE

## 2024-02-06 VITALS
BODY MASS INDEX: 22.89 KG/M2 | HEIGHT: 72 IN | DIASTOLIC BLOOD PRESSURE: 76 MMHG | SYSTOLIC BLOOD PRESSURE: 126 MMHG | HEART RATE: 64 BPM | OXYGEN SATURATION: 94 % | WEIGHT: 169 LBS | TEMPERATURE: 98.1 F

## 2024-02-06 DIAGNOSIS — R06.9 UNSPECIFIED ABNORMALITIES OF BREATHING: ICD-10-CM

## 2024-02-06 DIAGNOSIS — E03.9 ACQUIRED HYPOTHYROIDISM: ICD-10-CM

## 2024-02-06 DIAGNOSIS — R60.0 PEDAL EDEMA: ICD-10-CM

## 2024-02-06 DIAGNOSIS — R60.0 PEDAL EDEMA: Primary | ICD-10-CM

## 2024-02-06 LAB
NT-PROBNP SERPL-MCNC: 104 PG/ML (ref 0–1800)
TSH SERPL DL<=0.05 MIU/L-ACNC: 2.68 UIU/ML (ref 0.27–4.2)

## 2024-02-06 PROCEDURE — 83880 ASSAY OF NATRIURETIC PEPTIDE: CPT

## 2024-02-06 PROCEDURE — 1160F RVW MEDS BY RX/DR IN RCRD: CPT | Performed by: NURSE PRACTITIONER

## 2024-02-06 PROCEDURE — 36415 COLL VENOUS BLD VENIPUNCTURE: CPT

## 2024-02-06 PROCEDURE — 1159F MED LIST DOCD IN RCRD: CPT | Performed by: NURSE PRACTITIONER

## 2024-02-06 PROCEDURE — 84443 ASSAY THYROID STIM HORMONE: CPT

## 2024-02-06 PROCEDURE — 99213 OFFICE O/P EST LOW 20 MIN: CPT | Performed by: NURSE PRACTITIONER

## 2024-02-06 NOTE — PROGRESS NOTES
"Chief Complaint  Foot Swelling (Both feet swelling  )    Subjective          Magan Lubin presents to [facility]  History of Present Illness  He is here with his daughter.    His daughter noticed his feet swollen since Friday. He is not wearing any compression socks. He denies difficulty breathing, chest pain. He had a CVA approximately 6 months ago. He moves around and doesn't stand or sit for long periods of time.       Objective   Vital Signs:   /76 (BP Location: Right arm, Patient Position: Sitting, Cuff Size: Adult)   Pulse 64   Temp 98.1 °F (36.7 °C) (Oral)   Ht 182.9 cm (72\")   Wt 76.7 kg (169 lb)   SpO2 94%   BMI 22.92 kg/m²     Physical Exam  Constitutional:       General: He is not in acute distress.     Appearance: Normal appearance. He is normal weight.   HENT:      Head: Normocephalic.   Eyes:      Pupils: Pupils are equal, round, and reactive to light.      Visual Fields: Right eye visual fields normal and left eye visual fields normal.   Neck:      Trachea: Trachea normal.   Cardiovascular:      Rate and Rhythm: Normal rate and regular rhythm.      Heart sounds: Normal heart sounds.   Pulmonary:      Effort: Pulmonary effort is normal.      Breath sounds: Normal breath sounds and air entry.   Musculoskeletal:      Right lower leg: Edema present.      Left lower leg: Edema present.   Skin:     General: Skin is warm and dry.   Neurological:      Mental Status: He is alert and oriented to person, place, and time.   Psychiatric:         Mood and Affect: Mood and affect normal.         Behavior: Behavior normal.         Thought Content: Thought content normal.        Result Review :   The following data was reviewed by: TYRONE Adler on 02/06/2024:                  Assessment and Plan    Diagnoses and all orders for this visit:    1. Pedal edema (Primary)  -     proBNP; Future    2. Unspecified abnormalities of breathing  -     proBNP; Future    He " does have swelling in his legs bilaterally, but it is not pitting.  Will check a proBNP.  I do recommend to him and his daughter that he prop his legs up and that he get properly fitted for compression socks.  Will hold off on sending in any furosemide at this time.      Follow Up   No follow-ups on file.  Patient was given instructions and counseling regarding his condition or for health maintenance advice. Please see specific information pulled into the AVS if appropriate.

## 2024-02-23 ENCOUNTER — TELEPHONE (OUTPATIENT)
Dept: FAMILY MEDICINE CLINIC | Age: 89
End: 2024-02-23

## 2024-02-23 ENCOUNTER — OFFICE VISIT (OUTPATIENT)
Dept: FAMILY MEDICINE CLINIC | Age: 89
End: 2024-02-23
Payer: MEDICARE

## 2024-02-23 VITALS
HEART RATE: 62 BPM | HEIGHT: 72 IN | DIASTOLIC BLOOD PRESSURE: 69 MMHG | BODY MASS INDEX: 23.7 KG/M2 | SYSTOLIC BLOOD PRESSURE: 123 MMHG | WEIGHT: 175 LBS | OXYGEN SATURATION: 96 %

## 2024-02-23 DIAGNOSIS — Z86.73 HISTORY OF STROKE WITHIN LAST YEAR: ICD-10-CM

## 2024-02-23 DIAGNOSIS — R41.3 MEMORY LOSS: ICD-10-CM

## 2024-02-23 DIAGNOSIS — K21.9 GASTROESOPHAGEAL REFLUX DISEASE WITHOUT ESOPHAGITIS: ICD-10-CM

## 2024-02-23 DIAGNOSIS — E03.9 ACQUIRED HYPOTHYROIDISM: ICD-10-CM

## 2024-02-23 DIAGNOSIS — N40.0 BENIGN PROSTATIC HYPERPLASIA WITHOUT LOWER URINARY TRACT SYMPTOMS: ICD-10-CM

## 2024-02-23 DIAGNOSIS — R60.0 LOCALIZED EDEMA: Primary | ICD-10-CM

## 2024-02-23 DIAGNOSIS — R53.1 GENERALIZED WEAKNESS: ICD-10-CM

## 2024-02-23 DIAGNOSIS — R41.0 CONFUSION: ICD-10-CM

## 2024-02-23 PROBLEM — Z23 NEED FOR INFLUENZA VACCINATION: Status: RESOLVED | Noted: 2023-11-28 | Resolved: 2024-02-23

## 2024-02-23 PROBLEM — R79.89 ELEVATED TSH: Status: RESOLVED | Noted: 2023-08-11 | Resolved: 2024-02-23

## 2024-02-23 PROBLEM — E06.0 ACUTE THYROIDITIS: Status: RESOLVED | Noted: 2023-08-11 | Resolved: 2024-02-23

## 2024-02-23 PROCEDURE — 99214 OFFICE O/P EST MOD 30 MIN: CPT | Performed by: NURSE PRACTITIONER

## 2024-02-23 PROCEDURE — 1159F MED LIST DOCD IN RCRD: CPT | Performed by: NURSE PRACTITIONER

## 2024-02-23 PROCEDURE — 1160F RVW MEDS BY RX/DR IN RCRD: CPT | Performed by: NURSE PRACTITIONER

## 2024-02-23 RX ORDER — POTASSIUM CHLORIDE 750 MG/1
TABLET, FILM COATED, EXTENDED RELEASE ORAL
Qty: 60 TABLET | Refills: 1 | Status: SHIPPED | OUTPATIENT
Start: 2024-02-23

## 2024-02-23 RX ORDER — FUROSEMIDE 20 MG/1
20 TABLET ORAL DAILY PRN
Qty: 30 TABLET | Refills: 1 | Status: SHIPPED | OUTPATIENT
Start: 2024-02-23

## 2024-02-23 RX ORDER — CLOPIDOGREL BISULFATE 75 MG/1
75 TABLET ORAL DAILY
Qty: 30 TABLET | Refills: 0 | Status: SHIPPED | OUTPATIENT
Start: 2024-02-23

## 2024-02-23 RX ORDER — TERAZOSIN 2 MG/1
2 CAPSULE ORAL DAILY
Qty: 90 CAPSULE | Refills: 1 | Status: SHIPPED | OUTPATIENT
Start: 2024-02-23

## 2024-02-23 RX ORDER — PANTOPRAZOLE SODIUM 40 MG/1
40 TABLET, DELAYED RELEASE ORAL
Qty: 90 TABLET | Refills: 1 | Status: SHIPPED | OUTPATIENT
Start: 2024-02-23

## 2024-02-23 RX ORDER — ATORVASTATIN CALCIUM 80 MG/1
80 TABLET, FILM COATED ORAL NIGHTLY
Qty: 90 TABLET | Refills: 1 | Status: SHIPPED | OUTPATIENT
Start: 2024-02-23

## 2024-02-23 RX ORDER — LEVOTHYROXINE SODIUM 0.03 MG/1
25 TABLET ORAL
Qty: 90 TABLET | Refills: 1 | Status: SHIPPED | OUTPATIENT
Start: 2024-02-23

## 2024-02-23 NOTE — TELEPHONE ENCOUNTER
Regine (POA) Daughter is requesting a urine sample to see if patient has a UTI to explain if that's why his dementia has gotten worse. Regine would like a call back.

## 2024-02-23 NOTE — PROGRESS NOTES
Chief Complaint  Leg Swelling (Patient c/o bilateral leg swelling ongoing not getting better) and paperwork (For help in the home )    Subjective          Magan Lubin presents to [facility]     Patient is an 88-year-old male who is here today with his daughter Regine ([phone]).  Patient had a stroke July 2023.  He will see neurology on March 12 and routine follow-up.  Has continued with some memory concerns but patient has been slightly more confused since his wife fell and broke her arm a couple weeks ago.  Patient's wife is currently in a nursing facility for rehab.  This has been stressful for patient in addition to patient's dog had to be put to sleep recently.  Daughter does not feel as if he needs any medication for anxiety or depression but she would like to get paperwork completed for possible SCL waiver to see what resources may be available to assist patient at the home.  We had previously ordered a home health referral and daughter is not sure why patient's wife postpone [facility] coming into the home for physical therapy.  He does persist with some generalized weakness since his stroke.  Primary concern is he is continuing with edema of both ankles.  He had a venous Doppler ultrasound of the left leg in June.  They are trying to implement use of compression stockings.  Amount of edema seems consistent but not worsening.  His recent BNP level was normal.  He has over the last few months been started on levothyroxine 25 mcg daily for hypothyroidism.  TSH level was recently normal.  He saw vascular on January 9.  He needs a short-term refill of Plavix until we can get to see neurology in March.    For history of stroke he takes atorvastatin 80 mg at bedtime.  Denies side effects and requests refills.    Acid reflux symptoms are stable on Protonix 40 mg every day.  Denies side effects and requests refills.     Objective   Vital Signs:   Vitals:    02/23/24 1332  "  BP: 123/69   BP Location: Left arm   Patient Position: Sitting   Cuff Size: Adult   Pulse: 62   SpO2: 96%   Weight: 79.4 kg (175 lb)   Height: 182.9 cm (72\")       Wt Readings from Last 3 Encounters:   24 79.4 kg (175 lb)   24 76.7 kg (169 lb)   23 78.9 kg (174 lb)      BP Readings from Last 3 Encounters:   24 123/69   24 126/76   23 115/71       Body mass index is 23.73 kg/m².    BMI is within normal parameters. No other follow-up for BMI required.       Physical Exam  Vitals reviewed.   Constitutional:       General: He is not in acute distress.     Appearance: Normal appearance. He is well-developed.   Neck:      Thyroid: No thyromegaly.      Vascular: No carotid bruit.   Cardiovascular:      Rate and Rhythm: Normal rate and regular rhythm.      Pulses:           Posterior tibial pulses are 2+ on the right side and 2+ on the left side.      Heart sounds: Normal heart sounds.   Pulmonary:      Effort: Pulmonary effort is normal.      Breath sounds: Normal breath sounds.   Musculoskeletal:      Right lower le+ Pitting No edema.      Left lower le+ Pitting No edema.      Comments: Walks with walker   Skin:     General: Skin is warm and dry.   Neurological:      General: No focal deficit present.      Mental Status: He is alert.   Psychiatric:         Attention and Perception: Attention normal.         Mood and Affect: Mood and affect normal.         Behavior: Behavior normal.           Current Outpatient Medications:     aspirin 81 MG EC tablet, Take 1 tablet by mouth Daily., Disp: , Rfl:     atorvastatin (LIPITOR) 80 MG tablet, Take 1 tablet by mouth Every Night., Disp: 90 tablet, Rfl: 1    clopidogrel (PLAVIX) 75 MG tablet, Take 1 tablet by mouth Daily., Disp: 30 tablet, Rfl: 0    docusate sodium 100 MG capsule, Take 1 capsule by mouth 2 (Two) Times a Day., Disp: 30 capsule, Rfl: 0    levothyroxine (SYNTHROID, LEVOTHROID) 25 MCG tablet, Take 1 tablet by mouth Every " Morning., Disp: 90 tablet, Rfl: 1    pantoprazole (PROTONIX) 40 MG EC tablet, Take 1 tablet by mouth Every Morning., Disp: 90 tablet, Rfl: 1    terazosin (HYTRIN) 2 MG capsule, Take 1 capsule by mouth Daily., Disp: 90 capsule, Rfl: 1    furosemide (Lasix) 20 MG tablet, Take 1 tablet by mouth Daily As Needed (edema)., Disp: 30 tablet, Rfl: 1    potassium chloride 10 MEQ CR tablet, Take 2 tablets once daily prn on  days that lasix is taken, Disp: 60 tablet, Rfl: 1   Past Medical History:   Diagnosis Date    Hyperlipidemia     Prostate cancer     Squamous cell carcinoma of head and neck      Allergies   Allergen Reactions    Penicillins Unknown - Low Severity               Result Review :     Common labs          2023    05:01 2023    06:09 2023    15:47 2023    05:29   Common Labs   Glucose 106  99   106    BUN 20  16   14    Creatinine 0.78  0.80   0.80    Sodium 139  139   136    Potassium 3.8  3.8   3.9    Chloride 105  106   103    Calcium 9.5  9.2   9.1    Albumin 4.2  3.8   3.3    Total Bilirubin 0.4  0.4   0.7    Alkaline Phosphatase 111  108   95    AST (SGOT) 23  19   16    ALT (SGPT) 21  23   19    WBC 6.41  4.88   6.58    Hemoglobin 13.0  12.2  11.9  11.3    Hematocrit 38.1  35.8  35  32.8    Platelets 222  206   195         No Images in the past 120 days found..           Social History     Tobacco Use   Smoking Status Former    Packs/day: 1.00    Years: 58.00    Additional pack years: 0.00    Total pack years: 58.00    Types: Cigarettes    Start date:     Quit date:     Years since quittin.1    Passive exposure: Past   Smokeless Tobacco Never           Assessment and Plan    Diagnoses and all orders for this visit:    1. Localized edema (Primary)  -     furosemide (Lasix) 20 MG tablet; Take 1 tablet by mouth Daily As Needed (edema).  Dispense: 30 tablet; Refill: 1  -     potassium chloride 10 MEQ CR tablet; Take 2 tablets once daily prn on  days that lasix is taken   Dispense: 60 tablet; Refill: 1    2. Gastroesophageal reflux disease without esophagitis  -     pantoprazole (PROTONIX) 40 MG EC tablet; Take 1 tablet by mouth Every Morning.  Dispense: 90 tablet; Refill: 1    3. History of stroke within last year  Assessment & Plan:  Short-term refill of Plavix until he has follow-up with neurology.  Also discuss memory loss with neurology.    Orders:  -     clopidogrel (PLAVIX) 75 MG tablet; Take 1 tablet by mouth Daily.  Dispense: 30 tablet; Refill: 0  -     atorvastatin (LIPITOR) 80 MG tablet; Take 1 tablet by mouth Every Night.  Dispense: 90 tablet; Refill: 1    4. Benign prostatic hyperplasia without lower urinary tract symptoms  -     terazosin (HYTRIN) 2 MG capsule; Take 1 capsule by mouth Daily.  Dispense: 90 capsule; Refill: 1    5. Acquired hypothyroidism  -     levothyroxine (SYNTHROID, LEVOTHROID) 25 MCG tablet; Take 1 tablet by mouth Every Morning.  Dispense: 90 tablet; Refill: 1    6. Confusion  -     Urinalysis With Culture If Indicated -; Future    7. Generalized weakness  Assessment & Plan:  Proceed with physical therapy per home health as ordered.  Low up in 3 months or sooner if concerns.      8. Memory loss  Assessment & Plan:  Monitor closely and follow recommendations of neurologist.  Rule out a UTI with urinalysis.          Follow Up    No follow-ups on file.  Patient was given instructions and counseling regarding his condition or for health maintenance advice. Please see specific information pulled into the AVS if appropriate.

## 2024-02-26 ENCOUNTER — LAB (OUTPATIENT)
Dept: LAB | Facility: HOSPITAL | Age: 89
End: 2024-02-26
Payer: MEDICARE

## 2024-02-26 DIAGNOSIS — R41.0 CONFUSION: ICD-10-CM

## 2024-02-26 LAB
BACTERIA UR QL AUTO: NORMAL /HPF
BILIRUB UR QL STRIP: NEGATIVE
CLARITY UR: CLEAR
COLOR UR: YELLOW
GLUCOSE UR STRIP-MCNC: NEGATIVE MG/DL
HGB UR QL STRIP.AUTO: ABNORMAL
KETONES UR QL STRIP: ABNORMAL
LEUKOCYTE ESTERASE UR QL STRIP.AUTO: NEGATIVE
NITRITE UR QL STRIP: NEGATIVE
PH UR STRIP.AUTO: 6 [PH] (ref 5–8)
PROT UR QL STRIP: ABNORMAL
RBC # UR STRIP: NORMAL /HPF
REF LAB TEST METHOD: NORMAL
SP GR UR STRIP: 1.02 (ref 1–1.03)
SQUAMOUS #/AREA URNS HPF: NORMAL /HPF
UROBILINOGEN UR QL STRIP: ABNORMAL
WBC # UR STRIP: NORMAL /HPF

## 2024-02-26 PROCEDURE — 81001 URINALYSIS AUTO W/SCOPE: CPT

## 2024-02-28 NOTE — ASSESSMENT & PLAN NOTE
Proceed with physical therapy per home health as ordered.  Low up in 3 months or sooner if concerns.

## 2024-02-28 NOTE — ASSESSMENT & PLAN NOTE
Short-term refill of Plavix until he has follow-up with neurology.  Also discuss memory loss with neurology.

## 2024-03-11 ENCOUNTER — TELEPHONE (OUTPATIENT)
Dept: FAMILY MEDICINE CLINIC | Age: 89
End: 2024-03-11
Payer: MEDICARE

## 2024-03-11 NOTE — TELEPHONE ENCOUNTER
Caller: Regine Last    Relationship: Emergency Contact    Best call back number:     [phone]       What is the medical concern/diagnosis: MOBILITY PATIENT HAS GOTTEN WEAKER     What specialty or service is being requested: HOME HEALTH PHYSICAL THERAPY     What is the provider, practice or medical service name: [facility]    What is the office location: [place]    What is the office phone number: [phone]    Any additional details:

## 2024-03-12 ENCOUNTER — OFFICE VISIT (OUTPATIENT)
Dept: NEUROLOGY | Facility: CLINIC | Age: 89
End: 2024-03-12
Payer: MEDICARE

## 2024-03-12 VITALS
BODY MASS INDEX: 23.73 KG/M2 | SYSTOLIC BLOOD PRESSURE: 96 MMHG | HEART RATE: 61 BPM | HEIGHT: 72 IN | DIASTOLIC BLOOD PRESSURE: 52 MMHG | OXYGEN SATURATION: 94 %

## 2024-03-12 DIAGNOSIS — Z86.73 HISTORY OF STROKE: ICD-10-CM

## 2024-03-12 DIAGNOSIS — R41.3 MEMORY LOSS: Primary | ICD-10-CM

## 2024-03-12 DIAGNOSIS — R53.1 GENERALIZED WEAKNESS: Primary | ICD-10-CM

## 2024-03-12 RX ORDER — ESCITALOPRAM OXALATE 10 MG/1
10 TABLET ORAL DAILY
Qty: 30 TABLET | Refills: 5 | Status: SHIPPED | OUTPATIENT
Start: 2024-03-12

## 2024-03-12 NOTE — PROGRESS NOTES
CC: f/u stroke and memory loss    HPI:  Magan Lubin is a  88 y.o.  right-handed male, former smoker, with history of carotid stenosis, prostate cancer, and hyperlipidemia who is being seen in follow-up today for stroke and memory loss.  He is accompanied by his daughter Regine who is a nurse.    7/25/2023 the patient presented to [facility] because he was not feeling well.  He had woken up to go to the bathroom and had left-sided weakness and numbness and fell.  No previous history of stroke or TIA.  He was taking aspirin 81 mg daily and Lipitor 10 mg daily prior to admission.  CTA head/neck showed 50% left ICA stenosis and 70% stenosis at the right ICA origin as well as 60% focal stenosis of the left ICA.  MRI brain showed scattered strokes in the right cerebral hemisphere.  MRI cervical spine without was unremarkable, showed degenerative changes but no acute findings or abnormal cord signal.  2D echo was unremarkable.  Hemoglobin A1c was 5.6%, LDL was 67.   vascular surgery was consulted and he ultimately underwent right TCAR on 7/31/2023.  Aspirin was increased to 325 mg and Plavix 75 mg daily was added, Lipitor was increased to 80 mg daily.  Per discharge summary was recommended that he stay on DAPT for at least 3 months.  He is on Protonix 40 mg daily as well.      While he was hospitalized there were also concerns for memory issues. B12 level was 412  Folate was 6.8, TSH was initially normal at 2.95 but was later checked and was elevated at 6.8.  This was followed up by his PCP and has since normalized, most recent TSH level was 2.68 in February 2024.    When I saw him in the office in September 2023 his daughter noted worsening memory following his stroke but no significant issues with memory prior to his stroke.  He also developed some shuffling gait since his stroke and began using a walker.  MoCA in September 2023 was 12 out of 30.  Since I last saw him his wife had a fall breaking her arm  from which she underwent surgery and is now in rehab.  His dog also .  His shoulders have noted significant decline since then.  They have since installed cameras into his home and taking his keys.  He cooks his own breakfast and has not had any accidents with that and is also able to perform his own ADLs taking his shower nightly but 5 of his 6 children are local and are visiting him at least twice a day to help with medications and meals.  They bring him dinner every day.  They have noted that he is very restless in the evenings and is night frequently going through the cabinets and looking in closets.  He was taking melatonin at night but does not remember to take it regularly.  He typically goes to bed around midnight or 1 AM and is back up at 6 AM.  He has a walker but does not use it regularly.  Thankfully he has not had any falls.  His daughter also notes a change in his mood.  He has been anxious and cries more easily. There are no guns in the home.     No TIA or stroke symptoms since her last visit.  He has followed up with vascular surgery and was continued on low-dose aspirin and Plavix. He is also on Lipitor 80 mg daily.     The above history was reviewed and updated.     Past Medical History:   Diagnosis Date    Hyperlipidemia     Prostate cancer     Squamous cell carcinoma of head and neck          Past Surgical History:   Procedure Laterality Date    APPENDECTOMY      HERNIA REPAIR      KNEE ARTHROPLASTY, PARTIAL REPLACEMENT Bilateral            Current Outpatient Medications:     aspirin 81 MG EC tablet, Take 1 tablet by mouth Daily., Disp: , Rfl:     atorvastatin (LIPITOR) 80 MG tablet, Take 1 tablet by mouth Every Night., Disp: 90 tablet, Rfl: 1    clopidogrel (PLAVIX) 75 MG tablet, Take 1 tablet by mouth Daily., Disp: 30 tablet, Rfl: 0    docusate sodium 100 MG capsule, Take 1 capsule by mouth 2 (Two) Times a Day., Disp: 30 capsule, Rfl: 0    furosemide (Lasix) 20 MG tablet, Take 1 tablet by  "mouth Daily As Needed (edema)., Disp: 30 tablet, Rfl: 1    levothyroxine (SYNTHROID, LEVOTHROID) 25 MCG tablet, Take 1 tablet by mouth Every Morning., Disp: 90 tablet, Rfl: 1    pantoprazole (PROTONIX) 40 MG EC tablet, Take 1 tablet by mouth Every Morning., Disp: 90 tablet, Rfl: 1    potassium chloride 10 MEQ CR tablet, Take 2 tablets once daily prn on  days that lasix is taken, Disp: 60 tablet, Rfl: 1    terazosin (HYTRIN) 2 MG capsule, Take 1 capsule by mouth Daily., Disp: 90 capsule, Rfl: 1    escitalopram (Lexapro) 10 MG tablet, Take 1 tablet by mouth Daily., Disp: 30 tablet, Rfl: 5      History reviewed. No pertinent family history.      Social History     Socioeconomic History    Marital status:    Tobacco Use    Smoking status: Former     Current packs/day: 0.00     Average packs/day: 1 pack/day for 58.0 years (58.0 ttl pk-yrs)     Types: Cigarettes     Start date:      Quit date:      Years since quittin.2     Passive exposure: Past    Smokeless tobacco: Never   Vaping Use    Vaping status: Never Used   Substance and Sexual Activity    Alcohol use: Yes     Comment: occasional    Drug use: Defer    Sexual activity: Defer         Allergies   Allergen Reactions    Penicillins Unknown - Low Severity             Physical Exam:  Vitals:    24 1328   BP: 96/52   Pulse: 61   SpO2: 94%   Height: 182.9 cm (72\")     Orthostatic BP:    Body mass index is 23.73 kg/m².    General appearance: Well developed, well nourished, well groomed, alert and cooperative.   HEENT: Normocephalic.   Neck and spine: Normal range of motion. Normal alignment. No mass or tenderness.    Cardiac: Regular rate and rhythm.   Chest Exam: Clear to auscultation bilaterally, no wheezes, no rhonchi.  Extremities: Bilateral lower extremity edema.  Skin: No rashes or birthmarks.     Higher integrative function: Awake and alert, oriented to self, states it's Mar 2021. Impaired recent/remote memory, attention/concentration. " "Speech fluent. No neglect.   CN II: Normal visual fields.   CN III IV VI: Extraocular movements are full without nystagmus. Pupils are equal, round, and reactive to light.   CN V: Normal facial sensation.  CN VII: Facial movements are symmetric, no weakness.   CN VIII: Auditory acuity is normal.   CN IX & X: Symmetric palatal movement.   CN XI: Sternocleidomastoid and trapezius are normal. No weakness.   CN XII: The tongue is midline.  Motor: Normal muscle strength, bulk, and tone in upper and lower extremities. No fasciculations, rigidity, spasticity or abnormal movements.   Sensation: Intact/symmetric to LT in extremities.   Station and gait: He was seen by pushing up with his arms, using rolling walker, shortened step length.  Coordination: Finger to nose test showed no dysmetria.       Results:      Lab Results   Component Value Date    GLUCOSE 106 (H) 08/01/2023    BUN 14 08/01/2023    CREATININE 0.80 08/01/2023    BCR 17.5 08/01/2023    CO2 22.8 08/01/2023    CALCIUM 9.1 08/01/2023    ALBUMIN 3.3 (L) 08/01/2023    LABIL2 1.4 06/02/2021    AST 16 08/01/2023    ALT 19 08/01/2023       Lab Results   Component Value Date    WBC 6.58 08/01/2023    HGB 11.3 (L) 08/01/2023    HCT 32.8 (L) 08/01/2023    MCV 97.0 08/01/2023     08/01/2023         .No results found for: \"RPR\"      Lab Results   Component Value Date    TSH 2.680 02/06/2024    M7KEVAX 7.92 09/15/2023    THYROIDAB 14 09/15/2023         Lab Results   Component Value Date    FWAAZPHH83 412 07/25/2023         Lab Results   Component Value Date    FOLATE 6.80 07/25/2023         Lab Results   Component Value Date    HGBA1C 5.60 07/25/2023         Lab Results   Component Value Date    GLUCOSE 106 (H) 08/01/2023    BUN 14 08/01/2023    CREATININE 0.80 08/01/2023    BCR 17.5 08/01/2023    K 3.9 08/01/2023    CO2 22.8 08/01/2023    CALCIUM 9.1 08/01/2023    ALBUMIN 3.3 (L) 08/01/2023    LABIL2 1.4 06/02/2021    AST 16 08/01/2023    ALT 19 08/01/2023 "         Lab Results   Component Value Date    WBC 6.58 08/01/2023    HGB 11.3 (L) 08/01/2023    HCT 32.8 (L) 08/01/2023    MCV 97.0 08/01/2023     08/01/2023             Assessment/Plan:     Diagnoses and all orders for this visit:    1. Memory loss (Primary)    2. History of stroke    Other orders  -     escitalopram (Lexapro) 10 MG tablet; Take 1 tablet by mouth Daily.  Dispense: 30 tablet; Refill: 5    For memory loss, we discussed consideration of cholinesterase inhibitor such as Aricept.  For now I think it is important to help his mood.  Will start Lexapro 5 mg nightly for 1 week then 10 mg nightly, take for at least 6 weeks if tolerated, if no improvement in mood would recommend follow-up with PCP for further recommendations.    For stroke prevention   Continue DAPT with low-dose aspirin and Plavix  Blood pressure control to <130/80   Goal LDL <70-recommend high dose statins-continue Lipitor 80 mg    Serum glucose < 140   Call 911 for stroke any stroke symptoms   Follow-up in 6 months or sooner if needed        Total time > 40 min      Dictated utilizing Dragon dictation.

## 2024-03-19 ENCOUNTER — HOSPITAL ENCOUNTER (OUTPATIENT)
Dept: CT IMAGING | Facility: HOSPITAL | Age: 89
Discharge: HOME | End: 2024-03-19
Admitting: NURSE PRACTITIONER
Payer: MEDICARE

## 2024-03-19 DIAGNOSIS — Z87.891 FORMER SMOKER: ICD-10-CM

## 2024-03-19 DIAGNOSIS — R91.1 LEFT UPPER LOBE PULMONARY NODULE: ICD-10-CM

## 2024-03-19 LAB
CREAT BLDA-MCNC: 1 MG/DL (ref 0.6–1.3)
EGFRCR SERPLBLD CKD-EPI 2021: 73.3 ML/MIN/1.73

## 2024-03-19 PROCEDURE — 71260 CT THORAX DX C+: CPT

## 2024-03-19 PROCEDURE — 25510000001 IOPAMIDOL PER 1 ML: Performed by: NURSE PRACTITIONER

## 2024-03-19 PROCEDURE — 82565 ASSAY OF CREATININE: CPT

## 2024-03-19 RX ADMIN — IOPAMIDOL 100 ML: 755 INJECTION, SOLUTION INTRAVENOUS at 15:31

## 2024-03-20 NOTE — PROGRESS NOTES
Coronary artery calcification which is not a new finding.  Continue to keep good control of blood pressure, cholesterol, and blood sugar.  Left upper lobe lung nodule is stable.  It is recommended to repeat 1 more chest CT after July 2025 and if area remains unchanged no further follow-up will be necessary after that time.  Please call the office if any questions.

## 2024-03-29 ENCOUNTER — OUTSIDE FACILITY SERVICE (OUTPATIENT)
Dept: FAMILY MEDICINE CLINIC | Age: 89
End: 2024-03-29
Payer: MEDICARE

## 2024-03-29 ENCOUNTER — TELEPHONE (OUTPATIENT)
Dept: FAMILY MEDICINE CLINIC | Age: 89
End: 2024-03-29
Payer: MEDICARE

## 2024-03-29 NOTE — TELEPHONE ENCOUNTER
Caller: Regine Last    Relationship: Emergency Contact    Best call back number: [phone]    Caller requesting test results: DAUGHTER     What test was performed: CT SCAN CHEST    When was the test performed: 03/19/2024    Where was the test performed: [facility]     Additional notes: PATIENT CURRENTLY DOES NOT HAVE ACCESS TO MY CHART AND DAUGHTER WOULD LIKE SOMEONE TO CALL HER AND GO OVER THE RESULTS

## 2024-04-08 ENCOUNTER — TELEPHONE (OUTPATIENT)
Dept: FAMILY MEDICINE CLINIC | Age: 89
End: 2024-04-08
Payer: MEDICARE

## 2024-04-08 NOTE — TELEPHONE ENCOUNTER
Caller: Jose Last    Relationship: Emergency Contact    Best call back number: [phone]     What was the call regarding: JOSE STATES SHE DROPPED OFF MEDICARE WAIVER FORMS LAST WEEK AND WOULD LIKE TO KNOW WHAT THE TURNAROUND TIME IS.

## 2024-04-13 DIAGNOSIS — Z86.73 HISTORY OF STROKE WITHIN LAST YEAR: ICD-10-CM

## 2024-04-15 RX ORDER — CLOPIDOGREL BISULFATE 75 MG/1
75 TABLET ORAL DAILY
Qty: 90 TABLET | Refills: 0 | OUTPATIENT
Start: 2024-04-15

## 2024-04-23 DIAGNOSIS — R60.0 LOCALIZED EDEMA: ICD-10-CM

## 2024-04-23 RX ORDER — POTASSIUM CHLORIDE 750 MG/1
TABLET, FILM COATED, EXTENDED RELEASE ORAL
Qty: 60 TABLET | Refills: 1 | Status: SHIPPED | OUTPATIENT
Start: 2024-04-23

## 2024-04-23 RX ORDER — FUROSEMIDE 20 MG/1
20 TABLET ORAL DAILY PRN
Qty: 30 TABLET | Refills: 1 | Status: SHIPPED | OUTPATIENT
Start: 2024-04-23

## 2024-05-04 DIAGNOSIS — Z86.73 HISTORY OF STROKE WITHIN LAST YEAR: ICD-10-CM

## 2024-05-06 RX ORDER — CLOPIDOGREL BISULFATE 75 MG/1
75 TABLET ORAL DAILY
Qty: 30 TABLET | Refills: 0 | OUTPATIENT
Start: 2024-05-06

## 2024-05-07 DIAGNOSIS — Z86.73 HISTORY OF STROKE WITHIN LAST YEAR: ICD-10-CM

## 2024-05-07 RX ORDER — CLOPIDOGREL BISULFATE 75 MG/1
75 TABLET ORAL DAILY
Qty: 30 TABLET | Refills: 0 | OUTPATIENT
Start: 2024-05-07

## 2024-05-07 RX ORDER — CLOPIDOGREL BISULFATE 75 MG/1
75 TABLET ORAL DAILY
Qty: 30 TABLET | Refills: 11 | Status: SHIPPED | OUTPATIENT
Start: 2024-05-07

## 2024-05-28 ENCOUNTER — OFFICE VISIT (OUTPATIENT)
Dept: FAMILY MEDICINE CLINIC | Age: 89
End: 2024-05-28
Payer: MEDICARE

## 2024-05-28 ENCOUNTER — LAB (OUTPATIENT)
Dept: LAB | Facility: HOSPITAL | Age: 89
End: 2024-05-28
Payer: MEDICARE

## 2024-05-28 VITALS
HEIGHT: 72 IN | RESPIRATION RATE: 18 BRPM | TEMPERATURE: 98.3 F | HEART RATE: 56 BPM | DIASTOLIC BLOOD PRESSURE: 62 MMHG | SYSTOLIC BLOOD PRESSURE: 119 MMHG | OXYGEN SATURATION: 96 % | BODY MASS INDEX: 23.89 KG/M2 | WEIGHT: 176.4 LBS

## 2024-05-28 DIAGNOSIS — I69.90 UNSPECIFIED SEQUELAE OF UNSPECIFIED CEREBROVASCULAR DISEASE: ICD-10-CM

## 2024-05-28 DIAGNOSIS — Z86.73 HISTORY OF STROKE: ICD-10-CM

## 2024-05-28 DIAGNOSIS — K21.9 GASTROESOPHAGEAL REFLUX DISEASE WITHOUT ESOPHAGITIS: ICD-10-CM

## 2024-05-28 DIAGNOSIS — R41.3 MEMORY LOSS: ICD-10-CM

## 2024-05-28 DIAGNOSIS — Z00.00 MEDICARE ANNUAL WELLNESS VISIT, SUBSEQUENT: Primary | ICD-10-CM

## 2024-05-28 DIAGNOSIS — E03.9 ACQUIRED HYPOTHYROIDISM: ICD-10-CM

## 2024-05-28 DIAGNOSIS — H61.21 IMPACTED CERUMEN OF RIGHT EAR: ICD-10-CM

## 2024-05-28 DIAGNOSIS — N40.0 BENIGN PROSTATIC HYPERPLASIA WITHOUT LOWER URINARY TRACT SYMPTOMS: ICD-10-CM

## 2024-05-28 LAB
ALBUMIN SERPL-MCNC: 4.5 G/DL (ref 3.5–5.2)
ALBUMIN/GLOB SERPL: 1.8 G/DL
ALP SERPL-CCNC: 114 U/L (ref 39–117)
ALT SERPL W P-5'-P-CCNC: 22 U/L (ref 1–41)
ANION GAP SERPL CALCULATED.3IONS-SCNC: 11 MMOL/L (ref 5–15)
AST SERPL-CCNC: 19 U/L (ref 1–40)
BILIRUB SERPL-MCNC: 0.4 MG/DL (ref 0–1.2)
BUN SERPL-MCNC: 28 MG/DL (ref 8–23)
BUN/CREAT SERPL: 30.4 (ref 7–25)
CALCIUM SPEC-SCNC: 9.5 MG/DL (ref 8.6–10.5)
CHLORIDE SERPL-SCNC: 101 MMOL/L (ref 98–107)
CHOLEST SERPL-MCNC: 117 MG/DL (ref 0–200)
CO2 SERPL-SCNC: 26 MMOL/L (ref 22–29)
CREAT SERPL-MCNC: 0.92 MG/DL (ref 0.76–1.27)
EGFRCR SERPLBLD CKD-EPI 2021: 81 ML/MIN/1.73
GLOBULIN UR ELPH-MCNC: 2.5 GM/DL
GLUCOSE SERPL-MCNC: 93 MG/DL (ref 65–99)
HDLC SERPL-MCNC: 57 MG/DL (ref 40–60)
LDLC SERPL CALC-MCNC: 49 MG/DL (ref 0–100)
LDLC/HDLC SERPL: 0.89 {RATIO}
POTASSIUM SERPL-SCNC: 4.8 MMOL/L (ref 3.5–5.2)
PROT SERPL-MCNC: 7 G/DL (ref 6–8.5)
SODIUM SERPL-SCNC: 138 MMOL/L (ref 136–145)
TRIGL SERPL-MCNC: 47 MG/DL (ref 0–150)
TSH SERPL DL<=0.05 MIU/L-ACNC: 3.19 UIU/ML (ref 0.27–4.2)
VLDLC SERPL-MCNC: 11 MG/DL (ref 5–40)

## 2024-05-28 PROCEDURE — 84443 ASSAY THYROID STIM HORMONE: CPT

## 2024-05-28 PROCEDURE — 80061 LIPID PANEL: CPT

## 2024-05-28 PROCEDURE — 80053 COMPREHEN METABOLIC PANEL: CPT

## 2024-05-28 PROCEDURE — G0439 PPPS, SUBSEQ VISIT: HCPCS | Performed by: NURSE PRACTITIONER

## 2024-05-28 PROCEDURE — 1159F MED LIST DOCD IN RCRD: CPT | Performed by: NURSE PRACTITIONER

## 2024-05-28 PROCEDURE — 1170F FXNL STATUS ASSESSED: CPT | Performed by: NURSE PRACTITIONER

## 2024-05-28 PROCEDURE — 1160F RVW MEDS BY RX/DR IN RCRD: CPT | Performed by: NURSE PRACTITIONER

## 2024-05-28 PROCEDURE — 36415 COLL VENOUS BLD VENIPUNCTURE: CPT

## 2024-05-28 RX ORDER — PANTOPRAZOLE SODIUM 40 MG/1
40 TABLET, DELAYED RELEASE ORAL
Qty: 90 TABLET | Refills: 1 | Status: SHIPPED | OUTPATIENT
Start: 2024-05-28

## 2024-05-28 RX ORDER — TERAZOSIN 2 MG/1
2 CAPSULE ORAL DAILY
Qty: 90 CAPSULE | Refills: 1 | Status: SHIPPED | OUTPATIENT
Start: 2024-05-28

## 2024-05-28 NOTE — PROGRESS NOTES
Subsequent Medicare Wellness Visit  The ABC's of Medicare Wellness Visit  Chief Complaint   Patient presents with    Medicare Wellness-subsequent       Subjective   History of Present Illness      Magan is a 88 y.o. male who presents   for a Subsequent Medicare Wellness Visit.    Patient here today with daughter, Brielle.  Saw neurology in March with next visit in September.  Neurology has recommended that patient not drive and they prescribed Plavix and Lexapro for patient.  Lexapro does seem to have helped mood.  Last eye exam unknown.  Dental exam unknown.  He wears top dentures defers COVID, RSV or shingles vaccination at this time.  His lipid panels have been normal in the past but since his stroke he has been taking atorvastatin 80 mg at bedtime.  Family would like to have his liver function checked.  History of prostate cancer but still has his prostate gland.  Last PSA was negative on June 9, 2023.  History of prostate enlargement symptoms are stable on terazosin 2 mg daily.  Denies side effects and requests refills.    Daughter states patient has difficulty excepting the recommendation to not drive.  Patient verbalizes understanding of the reason that it is recommended that he not drive so that he does not hurt himself or others if he were to have another cardiovascular event    Acid reflux symptoms are stable on pantoprazole 40 mg daily.  Denies side effects and requests refills.      Does the patient have evidence of cognitive impairment?   No    Aspirin use counseling:Taking ASA appropriately as indicated    Recent Hospitalizations:  No hospitalization(s) within the last year.    Advanced Care Planning:  ACP discussion was held with the patient during this visit. Patient does not have an advance directive, declines further assistance.    The following portions of the patient's history were reviewed   and updated as appropriate: allergies, current medications, past family history, past medical history,  past social history, past surgical history, and problem list.    Compared to one year ago, the patient feels his   physical health is the same.  Compared to one year ago, the patient feels his   mental health is the same.    Reviewed chart for potential of high risk medication in the elderly:  yes  Reviewed chart for potential of harmful drug interactions in the elderly:  yes    BMI is within normal parameters. No other follow-up for BMI required.       HEALTH RISK ASSESSMENT BEGIN  Fall Risk Screen:  YENIADI Fall Risk Assessment was completed, and patient is at MODERATE risk for falls. Assessment completed on:2024    Depression Screen:       2024     2:29 PM   PHQ-2/PHQ-9 Depression Screening   Little Interest or Pleasure in Doing Things 0-->not at all   Feeling Down, Depressed or Hopeless 0-->not at all   PHQ-9: Brief Depression Severity Measure Score 0       Current Medical Providers:  Patient Care Team:  Jo Ann Friend APRN as PCP - General (Nurse Practitioner)  Ehsan Grossman MD as Surgeon (Cardiothoracic Surgery)  Alan Kendrick MD as Surgeon (Vascular Surgery)    Smoking Status:  Social History     Tobacco Use   Smoking Status Former    Current packs/day: 0.00    Average packs/day: 1 pack/day for 58.0 years (58.0 ttl pk-yrs)    Types: Cigarettes    Start date:     Quit date:     Years since quittin.4    Passive exposure: Past   Smokeless Tobacco Never       Alcohol Consumption:  Social History     Substance and Sexual Activity   Alcohol Use Yes    Comment: occasional       Health Habits and Functional and Cognitive Screenin/28/2024     2:26 PM   Functional & Cognitive Status   Do you have difficulty preparing food and eating? No   Do you have difficulty bathing yourself, getting dressed or grooming yourself? No   Do you have difficulty using the toilet? No   Do you have difficulty moving around from place to place? No   Do you have trouble with steps or getting out  of a bed or a chair? No   Current Diet Well Balanced Diet   Dental Exam Up to date   Eye Exam Up to date   Exercise (times per week) 7 times per week   Current Exercises Include Walking   Do you need help using the phone?  No   Are you deaf or do you have serious difficulty hearing?  No   Do you need help to go to places out of walking distance? Yes   Do you need help shopping? Yes   Do you need help preparing meals?  No   Do you need help with housework?  Yes   Do you need help with laundry? Yes   Do you need help taking your medications? Yes   Do you need help managing money? No   Do you ever drive or ride in a car without wearing a seat belt? No   Have you felt unusual stress, anger or loneliness in the last month? No   Who do you live with? Spouse   If you need help, do you have trouble finding someone available to you? No   Have you been bothered in the last four weeks by sexual problems? No   Do you have difficulty concentrating, remembering or making decisions? Yes       Age-appropriate Screening Schedule:  Refer to the list below for future screening recommendations based on patient's age, sex and/or medical conditions. Orders for these recommended tests are listed in the plan section. The patient has been provided with a written plan.    Health Maintenance   Topic Date Due    ZOSTER VACCINE (1 of 2) Never done    RSV Vaccine - Adults (1 - 1-dose 60+ series) Never done    COVID-19 Vaccine (4 - 2023-24 season) 09/01/2023    Pneumococcal Vaccine 65+ (2 of 2 - PCV) 08/11/2024 (Originally 2/4/2014)    TDAP/TD VACCINES (1 - Tdap) 08/11/2024 (Originally 6/18/1954)    LIPID PANEL  07/25/2024    INFLUENZA VACCINE  08/01/2024    ANNUAL WELLNESS VISIT  05/28/2025     HEALTH RISK ASSESSMENT END    Outpatient Medications Prior to Visit   Medication Sig Dispense Refill    aspirin 81 MG EC tablet Take 1 tablet by mouth Daily.      atorvastatin (LIPITOR) 80 MG tablet Take 1 tablet by mouth Every Night. 90 tablet 1     "clopidogrel (PLAVIX) 75 MG tablet Take 1 tablet by mouth Daily. 30 tablet 11    escitalopram (Lexapro) 10 MG tablet Take 1 tablet by mouth Daily. 30 tablet 5    furosemide (Lasix) 20 MG tablet Take 1 tablet by mouth Daily As Needed (edema). 30 tablet 1    levothyroxine (SYNTHROID, LEVOTHROID) 25 MCG tablet Take 1 tablet by mouth Every Morning. 90 tablet 1    potassium chloride 10 MEQ CR tablet Take 2 tablets once daily prn on  days that lasix is taken 60 tablet 1    pantoprazole (PROTONIX) 40 MG EC tablet Take 1 tablet by mouth Every Morning. 90 tablet 1    terazosin (HYTRIN) 2 MG capsule Take 1 capsule by mouth Daily. 90 capsule 1    docusate sodium 100 MG capsule Take 1 capsule by mouth 2 (Two) Times a Day. (Patient not taking: Reported on 5/28/2024) 30 capsule 0     No facility-administered medications prior to visit.       Patient Active Problem List   Diagnosis    Bilateral carotid artery stenosis    History of prostate cancer    Benign prostatic hyperplasia without lower urinary tract symptoms    Left upper extremity numbness    Symptomatic carotid artery stenosis    Ischemic stroke    Left upper lobe pulmonary nodule    Internal carotid artery stent present    Former smoker    Gastroesophageal reflux disease without esophagitis    History of stroke    Generalized weakness    Localized edema    Acquired hypothyroidism    Confusion    Memory loss    Medicare annual wellness visit, subsequent    Impacted cerumen of right ear            Objective      Vitals:    05/28/24 1419   BP: 119/62   BP Location: Right arm   Patient Position: Sitting   Cuff Size: Adult   Pulse: 56   Resp: 18   Temp: 98.3 °F (36.8 °C)   TempSrc: Temporal   SpO2: 96%   Weight: 80 kg (176 lb 6.4 oz)   Height: 182.9 cm (72\")       Physical Exam  Vitals reviewed.   Constitutional:       General: He is not in acute distress.     Appearance: Normal appearance. He is well-developed.   HENT:      Right Ear: There is impacted cerumen.      Left Ear: " Tympanic membrane normal.      Mouth/Throat:      Pharynx: No oropharyngeal exudate or posterior oropharyngeal erythema.   Neck:      Thyroid: No thyromegaly.      Vascular: No carotid bruit.   Cardiovascular:      Rate and Rhythm: Normal rate and regular rhythm.      Pulses:           Posterior tibial pulses are 2+ on the right side and 2+ on the left side.      Heart sounds: Normal heart sounds.   Pulmonary:      Effort: Pulmonary effort is normal.      Breath sounds: Normal breath sounds.   Musculoskeletal:      Right lower leg: No edema.      Left lower leg: No edema.   Skin:     General: Skin is warm and dry.   Neurological:      General: No focal deficit present.      Mental Status: He is alert.   Psychiatric:         Attention and Perception: Attention normal.         Mood and Affect: Mood and affect normal.         Behavior: Behavior normal.             Result Review :           Lab Results - Last 18 Months   Lab Units 03/19/24  1521 02/06/24  1628 11/28/23  1620 09/15/23  1141 08/01/23  0529 07/31/23  1547 07/31/23  0609 07/30/23  0501 07/26/23  0406 07/25/23  1503 06/09/23  1021 06/09/23  1021   BUN mg/dL  --   --   --   --  14  --  16 20   < > 17  --  18   CREATININE mg/dL 1.00  --   --   --  0.80  --  0.80 0.78   < > 0.87  --  0.82   SODIUM mmol/L  --   --   --   --  136  --  139 139   < > 139  --  139   POTASSIUM mmol/L  --   --   --   --  3.9  --  3.8 3.8   < > 4.0  --  4.4   CHLORIDE mmol/L  --   --   --   --  103  --  106 105   < > 104  --  105   CALCIUM mg/dL  --   --   --   --  9.1  --  9.2 9.5   < > 9.8  --  9.3   ALBUMIN g/dL  --   --   --   --  3.3*  --  3.8 4.2   < > 4.4  --  4.3   BILIRUBIN mg/dL  --   --   --   --  0.7  --  0.4 0.4   < > 0.6  --  0.4   ALK PHOS U/L  --   --   --   --  95  --  108 111   < > 95  --  104   AST (SGOT) U/L  --   --   --   --  16  --  19 23   < > 20  --  23   ALT (SGPT) U/L  --   --   --   --  19  --  23 21   < > 23  --  18   TRIGLYCERIDES mg/dL  --   --   --   --    --   --   --   --   --  47  --   --    HDL CHOL mg/dL  --   --   --   --   --   --   --   --   --  49  --   --    VLDL CHOL mg/dL  --   --   --   --   --   --   --   --   --  11  --   --    LDL CHOL mg/dL  --   --   --   --   --   --   --   --   --  67  --   --    LDL/HDL RATIO   --   --   --   --   --   --   --   --   --  1.40  --   --    HEMOGLOBIN A1C %  --   --   --   --   --   --   --   --   --  5.60  --   --    WBC 10*3/mm3  --   --   --   --  6.58  --  4.88 6.41   < > 6.65  --  5.21   RBC 10*6/mm3  --   --   --   --  3.38*  --  3.61* 3.81*   < > 3.82*  --  3.87*   HEMATOCRIT %  --   --   --   --  32.8*  --  35.8* 38.1   < > 38.3  --  38.7   HEMATOCRIT POC %  --   --   --   --   --  35*  --   --   --   --   --   --    MCV fL  --   --   --   --  97.0  --  99.2* 100.0*   < > 100.3*  --  100.0*   MCH pg  --   --   --   --  33.4*  --  33.8* 34.1*   < > 33.8*  --  34.4*   TSH uIU/mL  --  2.680 5.020* 4.820*  --   --  6.860*  --   --  2.950   < >  --    FREE T4 ng/dL  --   --  1.08  --   --   --   --   --   --   --   --   --    PSA ng/mL  --   --   --   --   --   --   --   --   --   --   --  1.030   INR   --   --   --   --   --   --   --   --   --  1.06  --   --     < > = values in this interval not displayed.       The following data was reviewed by: TYRONE Mann on 05/28/2024:    Assessment & Plan   Assessment and Plan      Diagnoses and all orders for this visit:    1. Medicare annual wellness visit, subsequent (Primary)  Assessment & Plan:  Preventive care measures are discussed.  Future recommendations pending lab results      2. Acquired hypothyroidism  -     TSH Rfx On Abnormal To Free T4; Future    3. Memory loss  Assessment & Plan:  Continue follow-up with neurology.  There is a camera in the home that helps monitor patient and his wife.        4. History of stroke  -     Comprehensive metabolic panel; Future  -     Lipid panel; Future    5. Benign prostatic hyperplasia without lower urinary  tract symptoms  -     terazosin (HYTRIN) 2 MG capsule; Take 1 capsule by mouth Daily.  Dispense: 90 capsule; Refill: 1    6. Impacted cerumen of right ear  -     Ear Cerumen Removal    7. Unspecified sequelae of unspecified cerebrovascular disease  -     Lipid panel; Future    8. Gastroesophageal reflux disease without esophagitis  -     pantoprazole (PROTONIX) 40 MG EC tablet; Take 1 tablet by mouth Every Morning.  Dispense: 90 tablet; Refill: 1        Medicare Risks and Personalized Health Plan  CMS Preventative Services Quick Reference  Cardiovascular risk  Immunizations Discussed/Encouraged (specific immunizations; Shingrix, COVID19, and RSV (Respiratory Syncytial Virus) )  Prostate Cancer Screening     The above risks/problems have been discussed with the patient.  Pertinent information has been shared with the patient in the   After Visit Summary. Follow up plans and orders are seen below   in the Assessment/Plan Section.    I spent 50 minutes caring for Magan on this date of service. This time includes time spent by me in the following activities:preparing for the visit, reviewing tests, obtaining and/or reviewing a separately obtained history, performing a medically appropriate examination and/or evaluation , counseling and educating the patient/family/caregiver, ordering medications, tests, or procedures, and documenting information in the medical record    Follow Up   Return in about 6 months (around 11/28/2024).     An After Visit Summary and PPPS were given to the patient.

## 2024-05-28 NOTE — ASSESSMENT & PLAN NOTE
Continue follow-up with neurology.  There is a camera in the home that helps monitor patient and his wife.

## 2024-06-18 ENCOUNTER — READMISSION MANAGEMENT (OUTPATIENT)
Dept: CALL CENTER | Facility: HOSPITAL | Age: 89
End: 2024-06-18
Payer: MEDICARE

## 2024-06-18 ENCOUNTER — TELEPHONE (OUTPATIENT)
Dept: FAMILY MEDICINE CLINIC | Age: 89
End: 2024-06-18
Payer: MEDICARE

## 2024-06-18 ENCOUNTER — TELEPHONE (OUTPATIENT)
Age: 89
End: 2024-06-18
Payer: MEDICARE

## 2024-06-18 NOTE — TELEPHONE ENCOUNTER
Caller: FIONA TEMPLE    Relationship to patient:     Best call back number: [phone]    New or established patient?  [] New  [x] Established    Date of discharge: 06/18/2024    Facility discharged from: [facility]    Diagnosis/Symptoms: SMALL BOWEL OBSTRUCTION, AFIB     Length of stay (If applicable): 5 DAYS       Topical Retinoid Pregnancy And Lactation Text: This medication is Pregnancy Category C. It is unknown if this medication is excreted in breast milk. Benzoyl Peroxide Pregnancy And Lactation Text: This medication is Pregnancy Category C. It is unknown if benzoyl peroxide is excreted in breast milk. Azithromycin Pregnancy And Lactation Text: This medication is considered safe during pregnancy and is also secreted in breast milk. Tetracycline Pregnancy And Lactation Text: This medication is Pregnancy Category D and not consider safe during pregnancy. It is also excreted in breast milk. Bactrim Pregnancy And Lactation Text: This medication is Pregnancy Category D and is known to cause fetal risk.  It is also excreted in breast milk. Birth Control Pills Pregnancy And Lactation Text: This medication should be avoided if pregnant and for the first 30 days post-partum. Detail Level: Zone Isotretinoin Counseling: Patient should get monthly blood tests, not donate blood, not drive at night if vision affected, not share medication, and not undergo elective surgery for 6 months after tx completed. Side effects reviewed, pt to contact office should one occur. Topical Clindamycin Pregnancy And Lactation Text: This medication is Pregnancy Category B and is considered safe during pregnancy. It is unknown if it is excreted in breast milk. Erythromycin Pregnancy And Lactation Text: This medication is Pregnancy Category B and is considered safe during pregnancy. It is also excreted in breast milk. Dapsone Pregnancy And Lactation Text: This medication is Pregnancy Category C and is not considered safe during pregnancy or breast feeding. Doxycycline Pregnancy And Lactation Text: This medication is Pregnancy Category D and not consider safe during pregnancy. It is also excreted in breast milk but is considered safe for shorter treatment courses. Isotretinoin Pregnancy And Lactation Text: This medication is Pregnancy Category X and is considered extremely dangerous during pregnancy. It is unknown if it is excreted in breast milk. Benzoyl Peroxide Counseling: Patient counseled that medicine may cause skin irritation and bleach clothing.  In the event of skin irritation, the patient was advised to reduce the amount of the drug applied or use it less frequently.   The patient verbalized understanding of the proper use and possible adverse effects of benzoyl peroxide.  All of the patient's questions and concerns were addressed. Topical Retinoid counseling:  Patient advised to apply a pea-sized amount only at bedtime and wait 30 minutes after washing their face before applying.  If too drying, patient may add a non-comedogenic moisturizer. The patient verbalized understanding of the proper use and possible adverse effects of retinoids.  All of the patient's questions and concerns were addressed. High Dose Vitamin A Pregnancy And Lactation Text: High dose vitamin A therapy is contraindicated during pregnancy and breast feeding. Spironolactone Pregnancy And Lactation Text: This medication can cause feminization of the male fetus and should be avoided during pregnancy. The active metabolite is also found in breast milk. Topical Clindamycin Counseling: Patient counseled that this medication may cause skin irritation or allergic reactions.  In the event of skin irritation, the patient was advised to reduce the amount of the drug applied or use it less frequently.   The patient verbalized understanding of the proper use and possible adverse effects of clindamycin.  All of the patient's questions and concerns were addressed. Use Enhanced Medication Counseling?: No Erythromycin Counseling:  I discussed with the patient the risks of erythromycin including but not limited to GI upset, allergic reaction, drug rash, diarrhea, increase in liver enzymes, and yeast infections. Minocycline Counseling: Patient advised regarding possible photosensitivity and discoloration of the teeth, skin, lips, tongue and gums.  Patient instructed to avoid sunlight, if possible.  When exposed to sunlight, patients should wear protective clothing, sunglasses, and sunscreen.  The patient was instructed to call the office immediately if the following severe adverse effects occur:  hearing changes, easy bruising/bleeding, severe headache, or vision changes.  The patient verbalized understanding of the proper use and possible adverse effects of minocycline.  All of the patient's questions and concerns were addressed. Tazorac Counseling:  Patient advised that medication is irritating and drying.  Patient may need to apply sparingly and wash off after an hour before eventually leaving it on overnight.  The patient verbalized understanding of the proper use and possible adverse effects of tazorac.  All of the patient's questions and concerns were addressed. High Dose Vitamin A Counseling: Side effects reviewed, pt to contact office should one occur. Topical Sulfur Applications Counseling: Topical Sulfur Counseling: Patient counseled that this medication may cause skin irritation or allergic reactions.  In the event of skin irritation, the patient was advised to reduce the amount of the drug applied or use it less frequently.   The patient verbalized understanding of the proper use and possible adverse effects of topical sulfur application.  All of the patient's questions and concerns were addressed. Topical Sulfur Applications Pregnancy And Lactation Text: This medication is Pregnancy Category C and has an unknown safety profile during pregnancy. It is unknown if this topical medication is excreted in breast milk. Azithromycin Counseling:  I discussed with the patient the risks of azithromycin including but not limited to GI upset, allergic reaction, drug rash, diarrhea, and yeast infections. Tazorac Pregnancy And Lactation Text: This medication is not safe during pregnancy. It is unknown if this medication is excreted in breast milk. Tetracycline Counseling: Patient counseled regarding possible photosensitivity and increased risk for sunburn.  Patient instructed to avoid sunlight, if possible.  When exposed to sunlight, patients should wear protective clothing, sunglasses, and sunscreen.  The patient was instructed to call the office immediately if the following severe adverse effects occur:  hearing changes, easy bruising/bleeding, severe headache, or vision changes.  The patient verbalized understanding of the proper use and possible adverse effects of tetracycline.  All of the patient's questions and concerns were addressed. Patient understands to avoid pregnancy while on therapy due to potential birth defects. Bactrim Counseling:  I discussed with the patient the risks of sulfa antibiotics including but not limited to GI upset, allergic reaction, drug rash, diarrhea, dizziness, photosensitivity, and yeast infections.  Rarely, more serious reactions can occur including but not limited to aplastic anemia, agranulocytosis, methemoglobinemia, blood dyscrasias, liver or kidney failure, lung infiltrates or desquamative/blistering drug rashes. Spironolactone Counseling: Patient advised regarding risks of diarrhea, abdominal pain, hyperkalemia, birth defects (for female patients), liver toxicity and renal toxicity. The patient may need blood work to monitor liver and kidney function and potassium levels while on therapy. The patient verbalized understanding of the proper use and possible adverse effects of spironolactone.  All of the patient's questions and concerns were addressed. Birth Control Pills Counseling: Birth Control Pill Counseling: I discussed with the patient the potential side effects of OCPs including but not limited to increased risk of stroke, heart attack, thrombophlebitis, deep venous thrombosis, hepatic adenomas, breast changes, GI upset, headaches, and depression.  The patient verbalized understanding of the proper use and possible adverse effects of OCPs. All of the patient's questions and concerns were addressed. Doxycycline Counseling:  Patient counseled regarding possible photosensitivity and increased risk for sunburn.  Patient instructed to avoid sunlight, if possible.  When exposed to sunlight, patients should wear protective clothing, sunglasses, and sunscreen.  The patient was instructed to call the office immediately if the following severe adverse effects occur:  hearing changes, easy bruising/bleeding, severe headache, or vision changes.  The patient verbalized understanding of the proper use and possible adverse effects of doxycycline.  All of the patient's questions and concerns were addressed. Dapsone Counseling: I discussed with the patient the risks of dapsone including but not limited to hemolytic anemia, agranulocytosis, rashes, methemoglobinemia, kidney failure, peripheral neuropathy, headaches, GI upset, and liver toxicity.  Patients who start dapsone require monitoring including baseline LFTs and weekly CBCs for the first month, then every month thereafter.  The patient verbalized understanding of the proper use and possible adverse effects of dapsone.  All of the patient's questions and concerns were addressed.

## 2024-06-18 NOTE — TELEPHONE ENCOUNTER
Patients daughter Regine called concerned about his medications. She stated that he just got released from [facility] due to hernia with bowel obstruction and AFIB with RVR. She stated that they ended up taking him off of aspirin and plavix and put him on Eliquis twice daily. She said that in the past, changes with these medications have always been discussed with vascular but this time it wasn't. She understands why they put him on Eliquis but she is just wanting to make sure he is okay to be off of plavix and aspirin from a vascular standpoint due to having stent.

## 2024-06-18 NOTE — OUTREACH NOTE
Prep Survey      Flowsheet Row Responses   [facility] patient discharged from? Non[facility]   Is LACE score < 7 ? Non-BH Discharge   Eligibility [facility]   Hospital [facility]   Date of Admission 06/13/24   Date of Discharge 06/18/24   Discharge Disposition Home or Self Care   Discharge diagnosis SMALL BOWEL OBSTRUCTION, AFIB   Does the patient have one of the following disease processes/diagnoses(primary or secondary)? Other   Prep survey completed? Yes            Bianca LAZARO - Registered Nurse

## 2024-06-19 ENCOUNTER — TRANSITIONAL CARE MANAGEMENT TELEPHONE ENCOUNTER (OUTPATIENT)
Dept: CALL CENTER | Facility: HOSPITAL | Age: 89
End: 2024-06-19
Payer: MEDICARE

## 2024-06-19 DIAGNOSIS — R53.1 GENERALIZED WEAKNESS: ICD-10-CM

## 2024-06-19 DIAGNOSIS — Z86.73 HISTORY OF STROKE: Primary | ICD-10-CM

## 2024-06-19 NOTE — OUTREACH NOTE
Call Center TCM Note      Flowsheet Row Responses   [facility] patient discharged from? Non-BH   Does the patient have one of the following disease processes/diagnoses(primary or secondary)? Other   TCM attempt successful? Yes   Call start time 1210   Call end time 1212   Discharge diagnosis SMALL BOWEL OBSTRUCTION, AFIB   Is patient permission given to speak with other caregiver? Yes   List who call center can speak with Regine Last   Person spoke with today (if not patient) and relationship Regine Last- daughter   Meds reviewed with patient/caregiver? Yes   Is the patient taking all medications as directed (includes completed medication regime)? Yes   Does the patient have an appointment with their PCP within 7-14 days of discharge? Yes   Has home health visited the patient within 72 hours of discharge? N/A   Psychosocial issues? No   What is the patient's perception of their health status since discharge? Improving   Is the patient/caregiver able to teach back signs and symptoms related to disease process for when to call PCP? Yes   Is the patient/caregiver able to teach back signs and symptoms related to disease process for when to call 911? Yes   Is the patient/caregiver able to teach back the hierarchy of who to call/visit for symptoms/problems? PCP, Specialist, Home health nurse, Urgent Care, ED, 911 Yes   If the patient is a current smoker, are they able to teach back resources for cessation? Not a smoker   TCM call completed? Yes   Call end time 1212   Would this patient benefit from a Referral to Amb Social Work? No   Is the patient interested in additional calls from an ambulatory ? No            Dana Day LPN    6/19/2024, 12:21 EDT

## 2024-06-21 ENCOUNTER — TELEPHONE (OUTPATIENT)
Dept: NEUROLOGY | Facility: CLINIC | Age: 89
End: 2024-06-21
Payer: MEDICARE

## 2024-06-21 NOTE — TELEPHONE ENCOUNTER
(Molly pt)  daughter Regine calling with medication concerns:  she say that Molly prescribed Lexapro and a home health nurse has added Amiodarone 200mg daily.  She says that the patient will not eat or drink anything and is just sitting and staring most of the time.  I advised daughter to proceed to the nearest ED for evaluation.  She ays that she needs to know if he should be taking these 2 medications together?  She also says that she does not know what stage the pt's Dementia is as far as advancement.  She has a call out to the pt's recent Surgeon with these questions as well  Daughter s understanding and would like a call back.

## 2024-06-21 NOTE — TELEPHONE ENCOUNTER
Provider: JOHNSON    Caller: JOSE    Relationship to Patient: DAUGHTER    Phone Number: [phone]     Reason for Call: PT'S DAUGHTER CALLED WITH A LOT OF WORRIES REGARDING PT. DAUGHTER STATES THAT PT WAS IN HOSPITAL FOR A HERNIA AND WAS PLACED ON AMNIO, PT IS ALS ON LEXAPRO AND IS UNSURE IF PT SHOULD BE ON THE 2 MEDICATION TOGETHER. DAUGHTER ALSO STATES THAT SINCE SURGERY IT SEEMS LIKE PT HAS DECLINED SO MUCH THAT PT IS JUST EXISTING AND DAUGHTER IS UNSURE ON WHAT TO DO. TRANSFERRED TO OFFICE AS PT DID JUST HAVE SURGERY AND DOES NOT SEEMS TO BE DOING WELL AND MAY NEED CLINICAL ADVISE ON WHAT TO DO.    THANK YOU

## 2024-06-24 NOTE — TELEPHONE ENCOUNTER
LM (Regine-daughter), with provider's response and questions.  Please call out office.  Phone number provided with this message.

## 2024-06-25 ENCOUNTER — TELEPHONE (OUTPATIENT)
Dept: FAMILY MEDICINE CLINIC | Age: 89
End: 2024-06-25

## 2024-06-25 ENCOUNTER — TELEPHONE (OUTPATIENT)
Dept: NEUROLOGY | Facility: CLINIC | Age: 89
End: 2024-06-25

## 2024-06-25 NOTE — TELEPHONE ENCOUNTER
Per patient chart, lexapro prescribed by shahana valle with neurology.  Patient daughter clarita has message into alexia valle for further direction.

## 2024-06-25 NOTE — TELEPHONE ENCOUNTER
Caller: Jose Last    Relationship: Emergency Contact    Best call back number: [phone]  What was the call regarding: JOSE RECEIVED THE VM AND STATED THAT THERE MAY BE SOME CONFUSION. THE PATIENT IS NOT SHOWING ANY SYMPTOMS BUT THAT THEY ARE CONCERNED FOR THE PROLONG OF A QTC INTERVAL WITH THE AMNIO AND LEXAPRO. THEY WANT TO MAKE THE PROVIDER AWARE AND IF THERE'S CONCERN, CAN THERE BE A CHANGE IN THE LEXAPRO MEDICATION TO SOMETHING THAT WILL NOT HAVE THIS ISSUE WITH THE AMNIO.     PLEASE REVIEW  THANK YOU

## 2024-06-25 NOTE — TELEPHONE ENCOUNTER
Kristal w/[facility] called stating that pt was dc'd with zofran and it has a drug interaction with pts Escitalopram and wanted to make sure that it is ok.

## 2024-06-26 NOTE — TELEPHONE ENCOUNTER
LM, with provider's recent response & recommendations.  Please call back and ask for Cici.  Thank you

## 2024-06-27 ENCOUNTER — TELEPHONE (OUTPATIENT)
Dept: NEUROLOGY | Facility: CLINIC | Age: 89
End: 2024-06-27
Payer: MEDICARE

## 2024-06-27 NOTE — TELEPHONE ENCOUNTER
I have provided pt's daughter (Yisel) with your response to the most recent message regarding Effexor.  Pt's daughter has multiple questions and shays she understands, but is requesting a call back from only you.  Please review  Thank you

## 2024-07-02 ENCOUNTER — OUTSIDE FACILITY SERVICE (OUTPATIENT)
Dept: FAMILY MEDICINE CLINIC | Age: 89
End: 2024-07-02
Payer: MEDICARE

## 2024-07-03 ENCOUNTER — OFFICE VISIT (OUTPATIENT)
Dept: FAMILY MEDICINE CLINIC | Age: 89
End: 2024-07-03
Payer: MEDICARE

## 2024-07-03 VITALS
WEIGHT: 161.2 LBS | HEART RATE: 67 BPM | OXYGEN SATURATION: 96 % | SYSTOLIC BLOOD PRESSURE: 112 MMHG | DIASTOLIC BLOOD PRESSURE: 65 MMHG | BODY MASS INDEX: 21.83 KG/M2 | TEMPERATURE: 97.6 F | HEIGHT: 72 IN

## 2024-07-03 DIAGNOSIS — L98.9 SCALP LESION: Primary | ICD-10-CM

## 2024-07-03 DIAGNOSIS — E03.9 ACQUIRED HYPOTHYROIDISM: ICD-10-CM

## 2024-07-03 DIAGNOSIS — Z86.73 HISTORY OF STROKE WITHIN LAST YEAR: ICD-10-CM

## 2024-07-03 DIAGNOSIS — R60.0 LOCALIZED EDEMA: ICD-10-CM

## 2024-07-03 DIAGNOSIS — R53.1 GENERALIZED WEAKNESS: ICD-10-CM

## 2024-07-03 DIAGNOSIS — R41.3 MEMORY LOSS: ICD-10-CM

## 2024-07-03 PROBLEM — K56.7 ILEUS: Chronic | Status: ACTIVE | Noted: 2024-06-17

## 2024-07-03 PROBLEM — I48.0 PAROXYSMAL ATRIAL FIBRILLATION: Chronic | Status: ACTIVE | Noted: 2024-06-17

## 2024-07-03 PROCEDURE — 99214 OFFICE O/P EST MOD 30 MIN: CPT | Performed by: NURSE PRACTITIONER

## 2024-07-03 RX ORDER — ATORVASTATIN CALCIUM 80 MG/1
80 TABLET, FILM COATED ORAL NIGHTLY
Qty: 90 TABLET | Refills: 1 | Status: SHIPPED | OUTPATIENT
Start: 2024-07-03

## 2024-07-03 RX ORDER — LEVOTHYROXINE SODIUM 0.03 MG/1
25 TABLET ORAL
Qty: 90 TABLET | Refills: 1 | Status: SHIPPED | OUTPATIENT
Start: 2024-07-03

## 2024-07-03 RX ORDER — POTASSIUM CHLORIDE 750 MG/1
TABLET, FILM COATED, EXTENDED RELEASE ORAL
Qty: 60 TABLET | Refills: 1 | Status: SHIPPED | OUTPATIENT
Start: 2024-07-03

## 2024-07-03 RX ORDER — FUROSEMIDE 20 MG/1
20 TABLET ORAL DAILY PRN
Qty: 60 TABLET | Refills: 1 | Status: SHIPPED | OUTPATIENT
Start: 2024-07-03

## 2024-07-03 RX ORDER — ONDANSETRON 4 MG/1
4 TABLET, ORALLY DISINTEGRATING ORAL
COMMUNITY
Start: 2024-06-21

## 2024-07-03 RX ORDER — AMIODARONE HYDROCHLORIDE 200 MG/1
200 TABLET ORAL DAILY
COMMUNITY
Start: 2024-06-19 | End: 2024-12-28

## 2024-07-03 RX ORDER — MEGESTROL ACETATE 20 MG/1
20 TABLET ORAL DAILY
COMMUNITY
Start: 2024-06-21

## 2024-07-03 NOTE — PROGRESS NOTES
"Chief Complaint  Transitional Care Management    Subjective          Magan Lubin presents to [facility]     Patient is an 89-year-old male who is here today with his daughter, Catherine.  He was recently hospitalized from June 9 through the 18th for small bowel obstruction and strangulated umbilical hernia which required surgical repair.  He experienced an ileus postoperatively and NG tube was inserted.  He also had problems with atrial fibrillation and he was started on amiodarone and Eliquis.  He is followed up with cardiology as an outpatient on July 1.  History of stroke  July 2023 and will see vascular on July 8.  Home health is still coming to the home for physical therapy and Occupational Therapy.  He has had some generalized weakness since his stroke last year.  He does use a walker.  He has been advised not to drive.  He does have some limitations with movement around the home.he bathes and feeds himself without difficulty.  He lives with his wife and family has ring cameras within the home to help monitor their status while they are at work.    Requests an area on his scalp be examined today.  Daughter thinks hair brush irritated an area on his scalp.    Objective   Vital Signs:   Vitals:    07/03/24 1309   BP: 112/65   BP Location: Left arm   Patient Position: Sitting   Pulse: 67   Temp: 97.6 °F (36.4 °C)   TempSrc: Temporal   SpO2: 96%   Weight: 73.1 kg (161 lb 3.2 oz)   Height: 182.9 cm (72.01\")       Wt Readings from Last 3 Encounters:   07/03/24 73.1 kg (161 lb 3.2 oz)   05/28/24 80 kg (176 lb 6.4 oz)   02/23/24 79.4 kg (175 lb)      BP Readings from Last 3 Encounters:   07/03/24 112/65   05/28/24 119/62   03/12/24 96/52       Body mass index is 21.86 kg/m².    BMI is within normal parameters. No other follow-up for BMI required.       Physical Exam  Vitals reviewed.   Constitutional:       General: He is not in acute distress.     Appearance: Normal appearance. He is " well-developed.   HENT:      Head:     Cardiovascular:      Rate and Rhythm: Normal rate and regular rhythm.      Pulses:           Posterior tibial pulses are 1+ on the right side and 1+ on the left side.      Heart sounds: Normal heart sounds.      Comments: Trace ankle edema bilateral  Pulmonary:      Effort: Pulmonary effort is normal.      Breath sounds: Normal breath sounds.   Musculoskeletal:      Right lower leg: No edema.      Left lower leg: No edema.   Skin:     General: Skin is warm and dry.   Neurological:      General: No focal deficit present.      Mental Status: He is alert.   Psychiatric:         Attention and Perception: Attention normal.         Mood and Affect: Mood and affect normal.         Behavior: Behavior normal.           Current Outpatient Medications:     amiodarone (PACERONE) 200 MG tablet, Take 1 tablet by mouth Daily., Disp: , Rfl:     apixaban (ELIQUIS) 5 MG tablet tablet, Take 1 tablet by mouth 2 (Two) Times a Day., Disp: , Rfl:     aspirin 81 MG EC tablet, Take 1 tablet by mouth Daily., Disp: , Rfl:     atorvastatin (LIPITOR) 80 MG tablet, Take 1 tablet by mouth Every Night., Disp: 90 tablet, Rfl: 1    clopidogrel (PLAVIX) 75 MG tablet, Take 1 tablet by mouth Daily., Disp: 30 tablet, Rfl: 11    escitalopram (Lexapro) 10 MG tablet, Take 1 tablet by mouth Daily., Disp: 30 tablet, Rfl: 5    furosemide (Lasix) 20 MG tablet, Take 1 tablet by mouth Daily As Needed (edema)., Disp: 60 tablet, Rfl: 1    levothyroxine (SYNTHROID, LEVOTHROID) 25 MCG tablet, Take 1 tablet by mouth Every Morning., Disp: 90 tablet, Rfl: 1    megestrol (MEGACE) 20 MG tablet, Take 1 tablet by mouth Daily., Disp: , Rfl:     ondansetron ODT (ZOFRAN-ODT) 4 MG disintegrating tablet, Take 1 tablet by mouth., Disp: , Rfl:     pantoprazole (PROTONIX) 40 MG EC tablet, Take 1 tablet by mouth Every Morning., Disp: 90 tablet, Rfl: 1    potassium chloride 10 MEQ CR tablet, Take 2 tablets once daily prn on  days that lasix is  taken, Disp: 60 tablet, Rfl: 1    terazosin (HYTRIN) 2 MG capsule, Take 1 capsule by mouth Daily., Disp: 90 capsule, Rfl: 1    docusate sodium 100 MG capsule, Take 1 capsule by mouth 2 (Two) Times a Day. (Patient not taking: Reported on 5/28/2024), Disp: 30 capsule, Rfl: 0    mupirocin (BACTROBAN) 2 % ointment, Apply 1 Application topically to the appropriate area as directed 3 (Three) Times a Day for 10 days., Disp: 30 g, Rfl: 0   Past Medical History:   Diagnosis Date    Hyperlipidemia     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     8/16/2023    Personal history of transient ischemic attack (TIA), and cerebral infarction without residual deficits     8/16/2023    Prostate cancer     Squamous cell carcinoma of head and neck      Allergies   Allergen Reactions    Penicillins Unknown - Low Severity               Result Review :     Common labs          8/1/2023    05:29 3/19/2024    15:21 5/28/2024    15:23   Common Labs   Glucose 106   93    BUN 14   28    Creatinine 0.80  1.00  0.92    Sodium 136   138    Potassium 3.9   4.8    Chloride 103   101    Calcium 9.1   9.5    Albumin 3.3   4.5    Total Bilirubin 0.7   0.4    Alkaline Phosphatase 95   114    AST (SGOT) 16   19    ALT (SGPT) 19   22    WBC 6.58      Hemoglobin 11.3      Hematocrit 32.8      Platelets 195      Total Cholesterol   117    Triglycerides   47    HDL Cholesterol   57    LDL Cholesterol    49         XR Abdomen Flat & Upright    Result Date: 6/16/2024  Nonspecific bowel gas pattern. Images reviewed, interpreted, and dictated by Dr. PORFIRIO Donald. Transcribed by Coral Santiago PA-C.    XR Abdomen Flat & Upright    Result Date: 6/15/2024  Distended bowel may be related to ileus or obstruction. Images reviewed, interpreted, and dictated by Dr. PORFIRIO Donald. Transcribed by Coral Santiago PA-C.    XR Abdomen Flat & Upright    Result Date: 6/14/2024  Diffusely dilated small bowel loops which is similar to previous and likely  secondary to ileus or obstruction. Images reviewed, interpreted, and dictated by Dr. PORFIRIO Donald. Transcribed by Bernice Givens PA-C.    XR Abdomen Flat & Upright    Result Date: 6/13/2024  Findings are compatible with ileus. Images personally reviewed, interpreted and dictated by DAVID Lorenzo.          XR Abdomen 1 View    Result Date: 6/10/2024  Nasogastric tube terminates in stomach. Images personally reviewed, interpreted and dictated by MARTINE Ellington M.D.    XR Abdomen 1 View    Result Date: 6/10/2024  NG tube is been retracted and the sidehole is now in the distal esophagus. Recommend advancing. Images reviewed, interpreted, and dictated by Shalonda Flores MD    XR Abdomen 1 View    Result Date: 6/9/2024  NG tube tip in stomach with sidehole is at the GE junction. Consider advancing. Dilated small bowel concerning for small bowel obstruction. Images reviewed, interpreted, and dictated by Dr. Shalonda Flores. Transcribed by Kahlil Crystal PA-C.    CT Abdomen Pelvis With Contrast    Result Date: 6/9/2024  1. Findings most consistent with a small bowel obstruction. Transition point not well visualized but likely in the pelvis. Associated mesenteric edema and small amount of mesenteric fluid. 2. Complex fat-containing umbilical hernia. Abnormal attenuation is present within the hernia. Incarceration not excluded. 3. Infrarenal abdominal aortic aneurysm. Images reviewed, interpreted, and dictated by Shalonda Flores MD    CT Chest With Contrast Diagnostic    Result Date: 3/19/2024  Impression:  1. Stable 4 mm left upper lobe pulmonary nodule. 1 additional noncontrast CT scan of the chest would be recommended after July 2025 to document stability of this nodule for a total of 2 years. 2. 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    Electronically Signed By-Villa Cavanaugh MD On:3/19/2024 4:43 PM               Social History     Tobacco Use   Smoking Status Former    Current packs/day:  0.00    Average packs/day: 1 pack/day for 58.0 years (58.0 ttl pk-yrs)    Types: Cigarettes    Start date:     Quit date:     Years since quittin.5    Passive exposure: Past   Smokeless Tobacco Never           Assessment and Plan    Diagnoses and all orders for this visit:    1. Scalp lesion (Primary)  -     mupirocin (BACTROBAN) 2 % ointment; Apply 1 Application topically to the appropriate area as directed 3 (Three) Times a Day for 10 days.  Dispense: 30 g; Refill: 0    2. Acquired hypothyroidism  -     levothyroxine (SYNTHROID, LEVOTHROID) 25 MCG tablet; Take 1 tablet by mouth Every Morning.  Dispense: 90 tablet; Refill: 1    3. History of stroke within last year  -     atorvastatin (LIPITOR) 80 MG tablet; Take 1 tablet by mouth Every Night.  Dispense: 90 tablet; Refill: 1    4. Generalized weakness  Assessment & Plan:  Continue with home health.      5. Memory loss  Assessment & Plan:  Continue with neurology and home health.      6. Localized edema  -     potassium chloride 10 MEQ CR tablet; Take 2 tablets once daily prn on  days that lasix is taken  Dispense: 60 tablet; Refill: 1  -     furosemide (Lasix) 20 MG tablet; Take 1 tablet by mouth Daily As Needed (edema).  Dispense: 60 tablet; Refill: 1        Follow Up    Return in about 8 weeks (around 2024).  Patient was given instructions and counseling regarding his condition or for health maintenance advice. Please see specific information pulled into the AVS if appropriate.

## 2024-07-10 ENCOUNTER — TELEPHONE (OUTPATIENT)
Dept: FAMILY MEDICINE CLINIC | Age: 89
End: 2024-07-10
Payer: MEDICARE

## 2024-07-10 NOTE — TELEPHONE ENCOUNTER
Caller: Jose Last    Relationship: Emergency Contact    Best call back number: [phone]     What was the call regarding: JOSE STATES [facility] WAS GIVEN AT HIS APPOINTMENT ON 7/3/24 TO HIS PROVIDER AND SHE WAS WANTING AN UPDATE ON THE STATUS OF THE PAPER WORK IF POSSIBLE.

## 2024-07-12 NOTE — ASSESSMENT & PLAN NOTE
Monitor for and report any signs of infection.  Apply mupirocin as directed.  Follow-up in 2 months or sooner if concerns.

## 2024-07-27 ENCOUNTER — APPOINTMENT (OUTPATIENT)
Dept: CT IMAGING | Facility: HOSPITAL | Age: 89
End: 2024-07-27
Payer: MEDICARE

## 2024-07-27 ENCOUNTER — HOSPITAL ENCOUNTER (OUTPATIENT)
Facility: HOSPITAL | Age: 89
Setting detail: OBSERVATION
Discharge: HOME HEALTH | End: 2024-07-29
Attending: EMERGENCY MEDICINE | Admitting: INTERNAL MEDICINE
Payer: MEDICARE

## 2024-07-27 ENCOUNTER — APPOINTMENT (OUTPATIENT)
Dept: GENERAL RADIOLOGY | Facility: HOSPITAL | Age: 89
End: 2024-07-27
Payer: MEDICARE

## 2024-07-27 DIAGNOSIS — S09.90XA CLOSED HEAD INJURY, INITIAL ENCOUNTER: Primary | ICD-10-CM

## 2024-07-27 DIAGNOSIS — S01.01XA LACERATION OF SCALP, INITIAL ENCOUNTER: ICD-10-CM

## 2024-07-27 DIAGNOSIS — R41.82 ALTERED MENTAL STATUS, UNSPECIFIED ALTERED MENTAL STATUS TYPE: ICD-10-CM

## 2024-07-27 LAB
ALBUMIN SERPL-MCNC: 3.9 G/DL (ref 3.5–5.2)
ALBUMIN/GLOB SERPL: 1.6 G/DL
ALP SERPL-CCNC: 76 U/L (ref 39–117)
ALT SERPL W P-5'-P-CCNC: 18 U/L (ref 1–41)
ANION GAP SERPL CALCULATED.3IONS-SCNC: 12.7 MMOL/L (ref 5–15)
AST SERPL-CCNC: 18 U/L (ref 1–40)
BACTERIA UR QL AUTO: NORMAL /HPF
BASOPHILS # BLD MANUAL: 0 10*3/MM3 (ref 0–0.2)
BASOPHILS NFR BLD MANUAL: 0 % (ref 0–1.5)
BILIRUB SERPL-MCNC: 0.5 MG/DL (ref 0–1.2)
BILIRUB UR QL STRIP: NEGATIVE
BUN SERPL-MCNC: 18 MG/DL (ref 8–23)
BUN/CREAT SERPL: 20.7 (ref 7–25)
CALCIUM SPEC-SCNC: 9.1 MG/DL (ref 8.6–10.5)
CHLORIDE SERPL-SCNC: 106 MMOL/L (ref 98–107)
CK SERPL-CCNC: 83 U/L (ref 20–200)
CLARITY UR: CLEAR
CO2 SERPL-SCNC: 20.3 MMOL/L (ref 22–29)
COLOR UR: YELLOW
CREAT SERPL-MCNC: 0.87 MG/DL (ref 0.76–1.27)
DEPRECATED RDW RBC AUTO: 51.2 FL (ref 37–54)
EGFRCR SERPLBLD CKD-EPI 2021: 83.5 ML/MIN/1.73
EOSINOPHIL # BLD MANUAL: 0 10*3/MM3 (ref 0–0.4)
EOSINOPHIL NFR BLD MANUAL: 0 % (ref 0.3–6.2)
ERYTHROCYTE [DISTWIDTH] IN BLOOD BY AUTOMATED COUNT: 13.1 % (ref 12.3–15.4)
GEN 5 2HR TROPONIN T REFLEX: 25 NG/L
GLOBULIN UR ELPH-MCNC: 2.4 GM/DL
GLUCOSE SERPL-MCNC: 113 MG/DL (ref 65–99)
GLUCOSE UR STRIP-MCNC: NEGATIVE MG/DL
HCT VFR BLD AUTO: 30.4 % (ref 37.5–51)
HGB BLD-MCNC: 10.1 G/DL (ref 13–17.7)
HGB UR QL STRIP.AUTO: ABNORMAL
HYALINE CASTS UR QL AUTO: NORMAL /LPF
KETONES UR QL STRIP: ABNORMAL
LEUKOCYTE ESTERASE UR QL STRIP.AUTO: NEGATIVE
LYMPHOCYTES # BLD MANUAL: 0.62 10*3/MM3 (ref 0.7–3.1)
LYMPHOCYTES NFR BLD MANUAL: 4 % (ref 5–12)
MCH RBC QN AUTO: 35.2 PG (ref 26.6–33)
MCHC RBC AUTO-ENTMCNC: 33.2 G/DL (ref 31.5–35.7)
MCV RBC AUTO: 105.9 FL (ref 79–97)
MONOCYTES # BLD: 0.27 10*3/MM3 (ref 0.1–0.9)
NEUTROPHILS # BLD AUTO: 5.95 10*3/MM3 (ref 1.7–7)
NEUTROPHILS NFR BLD MANUAL: 87 % (ref 42.7–76)
NITRITE UR QL STRIP: NEGATIVE
NRBC BLD AUTO-RTO: 0 /100 WBC (ref 0–0.2)
PH UR STRIP.AUTO: 5.5 [PH] (ref 5–8)
PLAT MORPH BLD: NORMAL
PLATELET # BLD AUTO: 229 10*3/MM3 (ref 140–450)
PMV BLD AUTO: 9.1 FL (ref 6–12)
POTASSIUM SERPL-SCNC: 4.2 MMOL/L (ref 3.5–5.2)
PROCALCITONIN SERPL-MCNC: 0.07 NG/ML (ref 0–0.25)
PROT SERPL-MCNC: 6.3 G/DL (ref 6–8.5)
PROT UR QL STRIP: NEGATIVE
RBC # BLD AUTO: 2.87 10*6/MM3 (ref 4.14–5.8)
RBC # UR STRIP: NORMAL /HPF
RBC MORPH BLD: NORMAL
REF LAB TEST METHOD: NORMAL
SODIUM SERPL-SCNC: 139 MMOL/L (ref 136–145)
SP GR UR STRIP: 1.02 (ref 1–1.03)
SQUAMOUS #/AREA URNS HPF: NORMAL /HPF
TROPONIN T DELTA: -10 NG/L
TROPONIN T SERPL HS-MCNC: 35 NG/L
UROBILINOGEN UR QL STRIP: ABNORMAL
VARIANT LYMPHS NFR BLD MANUAL: 9 % (ref 19.6–45.3)
WBC # UR STRIP: NORMAL /HPF
WBC MORPH BLD: NORMAL
WBC NRBC COR # BLD AUTO: 6.84 10*3/MM3 (ref 3.4–10.8)

## 2024-07-27 PROCEDURE — 93005 ELECTROCARDIOGRAM TRACING: CPT | Performed by: PHYSICIAN ASSISTANT

## 2024-07-27 PROCEDURE — G0378 HOSPITAL OBSERVATION PER HR: HCPCS

## 2024-07-27 PROCEDURE — 84145 PROCALCITONIN (PCT): CPT | Performed by: PHYSICIAN ASSISTANT

## 2024-07-27 PROCEDURE — 82550 ASSAY OF CK (CPK): CPT | Performed by: PHYSICIAN ASSISTANT

## 2024-07-27 PROCEDURE — 70450 CT HEAD/BRAIN W/O DYE: CPT

## 2024-07-27 PROCEDURE — 25810000003 SODIUM CHLORIDE 0.9 % SOLUTION: Performed by: EMERGENCY MEDICINE

## 2024-07-27 PROCEDURE — 80053 COMPREHEN METABOLIC PANEL: CPT | Performed by: PHYSICIAN ASSISTANT

## 2024-07-27 PROCEDURE — 72125 CT NECK SPINE W/O DYE: CPT

## 2024-07-27 PROCEDURE — 90471 IMMUNIZATION ADMIN: CPT | Performed by: PHYSICIAN ASSISTANT

## 2024-07-27 PROCEDURE — 90715 TDAP VACCINE 7 YRS/> IM: CPT | Performed by: PHYSICIAN ASSISTANT

## 2024-07-27 PROCEDURE — 81001 URINALYSIS AUTO W/SCOPE: CPT | Performed by: PHYSICIAN ASSISTANT

## 2024-07-27 PROCEDURE — 85025 COMPLETE CBC W/AUTO DIFF WBC: CPT | Performed by: PHYSICIAN ASSISTANT

## 2024-07-27 PROCEDURE — 93010 ELECTROCARDIOGRAM REPORT: CPT | Performed by: INTERNAL MEDICINE

## 2024-07-27 PROCEDURE — 36415 COLL VENOUS BLD VENIPUNCTURE: CPT

## 2024-07-27 PROCEDURE — 84484 ASSAY OF TROPONIN QUANT: CPT | Performed by: PHYSICIAN ASSISTANT

## 2024-07-27 PROCEDURE — 73560 X-RAY EXAM OF KNEE 1 OR 2: CPT

## 2024-07-27 PROCEDURE — 85007 BL SMEAR W/DIFF WBC COUNT: CPT | Performed by: PHYSICIAN ASSISTANT

## 2024-07-27 PROCEDURE — 71045 X-RAY EXAM CHEST 1 VIEW: CPT

## 2024-07-27 PROCEDURE — 99285 EMERGENCY DEPT VISIT HI MDM: CPT

## 2024-07-27 PROCEDURE — 25010000002 TETANUS-DIPHTH-ACELL PERTUSSIS 5-2.5-18.5 LF-MCG/0.5 SUSPENSION PREFILLED SYRINGE: Performed by: PHYSICIAN ASSISTANT

## 2024-07-27 RX ORDER — BISACODYL 10 MG
10 SUPPOSITORY, RECTAL RECTAL DAILY PRN
Status: DISCONTINUED | OUTPATIENT
Start: 2024-07-27 | End: 2024-07-29 | Stop reason: HOSPADM

## 2024-07-27 RX ORDER — PANTOPRAZOLE SODIUM 40 MG/1
40 TABLET, DELAYED RELEASE ORAL
Status: DISCONTINUED | OUTPATIENT
Start: 2024-07-28 | End: 2024-07-29 | Stop reason: HOSPADM

## 2024-07-27 RX ORDER — BISACODYL 5 MG/1
5 TABLET, DELAYED RELEASE ORAL DAILY PRN
Status: DISCONTINUED | OUTPATIENT
Start: 2024-07-27 | End: 2024-07-29 | Stop reason: HOSPADM

## 2024-07-27 RX ORDER — POLYETHYLENE GLYCOL 3350 17 G/17G
17 POWDER, FOR SOLUTION ORAL DAILY PRN
Status: DISCONTINUED | OUTPATIENT
Start: 2024-07-27 | End: 2024-07-29 | Stop reason: HOSPADM

## 2024-07-27 RX ORDER — LIDOCAINE HYDROCHLORIDE AND EPINEPHRINE 10; 10 MG/ML; UG/ML
10 INJECTION, SOLUTION INFILTRATION; PERINEURAL ONCE
Status: DISCONTINUED | OUTPATIENT
Start: 2024-07-27 | End: 2024-07-29 | Stop reason: HOSPADM

## 2024-07-27 RX ORDER — ACETAMINOPHEN 650 MG/1
650 SUPPOSITORY RECTAL EVERY 4 HOURS PRN
Status: DISCONTINUED | OUTPATIENT
Start: 2024-07-27 | End: 2024-07-29 | Stop reason: HOSPADM

## 2024-07-27 RX ORDER — ONDANSETRON 2 MG/ML
4 INJECTION INTRAMUSCULAR; INTRAVENOUS EVERY 6 HOURS PRN
Status: DISCONTINUED | OUTPATIENT
Start: 2024-07-27 | End: 2024-07-29 | Stop reason: HOSPADM

## 2024-07-27 RX ORDER — ACETAMINOPHEN 325 MG/1
650 TABLET ORAL EVERY 4 HOURS PRN
Status: DISCONTINUED | OUTPATIENT
Start: 2024-07-27 | End: 2024-07-29 | Stop reason: HOSPADM

## 2024-07-27 RX ORDER — ATORVASTATIN CALCIUM 80 MG/1
80 TABLET, FILM COATED ORAL NIGHTLY
Status: DISCONTINUED | OUTPATIENT
Start: 2024-07-28 | End: 2024-07-29 | Stop reason: HOSPADM

## 2024-07-27 RX ORDER — ACETAMINOPHEN 160 MG/5ML
650 SOLUTION ORAL EVERY 4 HOURS PRN
Status: DISCONTINUED | OUTPATIENT
Start: 2024-07-27 | End: 2024-07-29 | Stop reason: HOSPADM

## 2024-07-27 RX ORDER — ONDANSETRON 4 MG/1
4 TABLET, ORALLY DISINTEGRATING ORAL EVERY 6 HOURS PRN
Status: DISCONTINUED | OUTPATIENT
Start: 2024-07-27 | End: 2024-07-29 | Stop reason: HOSPADM

## 2024-07-27 RX ORDER — AMOXICILLIN 250 MG
2 CAPSULE ORAL 2 TIMES DAILY PRN
Status: DISCONTINUED | OUTPATIENT
Start: 2024-07-27 | End: 2024-07-29 | Stop reason: HOSPADM

## 2024-07-27 RX ORDER — AMIODARONE HYDROCHLORIDE 200 MG/1
200 TABLET ORAL DAILY
Status: DISCONTINUED | OUTPATIENT
Start: 2024-07-28 | End: 2024-07-29 | Stop reason: HOSPADM

## 2024-07-27 RX ADMIN — TETANUS TOXOID, REDUCED DIPHTHERIA TOXOID AND ACELLULAR PERTUSSIS VACCINE, ADSORBED 0.5 ML: 5; 2.5; 8; 8; 2.5 SUSPENSION INTRAMUSCULAR at 14:31

## 2024-07-27 RX ADMIN — SODIUM CHLORIDE 500 ML: 9 INJECTION, SOLUTION INTRAVENOUS at 14:46

## 2024-07-27 NOTE — ED NOTES
"Nursing report ED to floor  Magan Lubin  89 y.o.  male    HPI :  HPI (Adult)  Stated Reason for Visit: fall; unwitnessed unknown how long down  History Obtained From: EMS    Chief Complaint  Chief Complaint   Patient presents with    Fall     [place] [id number]; unwitnessed fall found bound family at home lives with wife       Admitting doctor:   Ree Abad MD    Admitting diagnosis:   The primary encounter diagnosis was Closed head injury, initial encounter. Diagnoses of Laceration of scalp, initial encounter and Altered mental status, unspecified altered mental status type were also pertinent to this visit.    Code status:   Current Code Status       Date Active Code Status Order ID Comments User Context       7/27/2024 1740 CPR (Attempt to Resuscitate) [id number]  Ree Abad MD ED        Question Answer    Code Status (Patient has no pulse and is not breathing) CPR (Attempt to Resuscitate)    Medical Interventions (Patient has pulse or is breathing) Full                    Allergies:   Penicillins    Isolation:   No active isolations    Intake and Output    Intake/Output Summary (Last 24 hours) at 7/27/2024 1749  Last data filed at 7/27/2024 1635  Gross per 24 hour   Intake 500 ml   Output --   Net 500 ml       Weight:       07/27/24  1403   Weight: 73.1 kg (161 lb 2.5 oz)       Most recent vitals:   Vitals:    07/27/24 1400 07/27/24 1403 07/27/24 1511 07/27/24 1631   BP:   131/62 121/62   Patient Position:   Lying Lying   Pulse:   65 69   Resp:       Temp: 98.3 °F (36.8 °C)      TempSrc: Oral      SpO2:   99% 99%   Weight:  73.1 kg (161 lb 2.5 oz)     Height:  182.9 cm (72.01\")         Active LDAs/IV Access:   Lines, Drains & Airways       Active LDAs       Name Placement date Placement time Site Days    Peripheral IV 07/27/24 1200 Anterior;Proximal;Right Forearm 07/27/24  1200  Forearm  less than 1                    Labs (abnormal labs have a star):   Labs Reviewed   COMPREHENSIVE " METABOLIC PANEL - Abnormal; Notable for the following components:       Result Value    Glucose 113 (*)     CO2 20.3 (*)     All other components within normal limits    Narrative:     GFR Normal >60  Chronic Kidney Disease <60  Kidney Failure <15    The GFR formula is only valid for adults with stable renal function between ages 18 and 70.   TROPONIN - Abnormal; Notable for the following components:    HS Troponin T 35 (*)     All other components within normal limits    Narrative:     High Sensitive Troponin T Reference Range:  <14.0 ng/L- Negative Female for AMI  <22.0 ng/L- Negative Male for AMI  >=14 - Abnormal Female indicating possible myocardial injury.  >=22 - Abnormal Male indicating possible myocardial injury.   Clinicians would have to utilize clinical acumen, EKG, Troponin, and serial changes to determine if it is an Acute Myocardial Infarction or myocardial injury due to an underlying chronic condition.        CBC WITH AUTO DIFFERENTIAL - Abnormal; Notable for the following components:    RBC 2.87 (*)     Hemoglobin 10.1 (*)     Hematocrit 30.4 (*)     .9 (*)     MCH 35.2 (*)     All other components within normal limits   MANUAL DIFFERENTIAL - Abnormal; Notable for the following components:    Neutrophil % 87.0 (*)     Lymphocyte % 9.0 (*)     Monocyte % 4.0 (*)     Eosinophil % 0.0 (*)     Lymphocytes Absolute 0.62 (*)     All other components within normal limits   HIGH SENSITIVITIY TROPONIN T 2HR - Abnormal; Notable for the following components:    HS Troponin T 25 (*)     Troponin T Delta -10 (*)     All other components within normal limits    Narrative:     High Sensitive Troponin T Reference Range:  <14.0 ng/L- Negative Female for AMI  <22.0 ng/L- Negative Male for AMI  >=14 - Abnormal Female indicating possible myocardial injury.  >=22 - Abnormal Male indicating possible myocardial injury.   Clinicians would have to utilize clinical acumen, EKG, Troponin, and serial changes to determine  "if it is an Acute Myocardial Infarction or myocardial injury due to an underlying chronic condition.        PROCALCITONIN - Normal    Narrative:     As a Marker for Sepsis (Non-Neonates):    1. <0.5 ng/mL represents a low risk of severe sepsis and/or septic shock.  2. >2 ng/mL represents a high risk of severe sepsis and/or septic shock.    As a Marker for Lower Respiratory Tract Infections that require antibiotic therapy:    PCT on Admission    Antibiotic Therapy       6-12 Hrs later    >0.5                Strongly Recommended  >0.25 - <0.5        Recommended   0.1 - 0.25          Discouraged              Remeasure/reassess PCT  <0.1                Strongly Discouraged     Remeasure/reassess PCT    As 28 day mortality risk marker: \"Change in Procalcitonin Result\" (>80% or <=80%) if Day 0 (or Day 1) and Day 4 values are available. Refer to http://www.Navidogpct-calculator.com    Change in PCT <=80%  A decrease of PCT levels below or equal to 80% defines a positive change in PCT test result representing a higher risk for 28-day all-cause mortality of patients diagnosed with severe sepsis for septic shock.    Change in PCT >80%  A decrease of PCT levels of more than 80% defines a negative change in PCT result representing a lower risk for 28-day all-cause mortality of patients diagnosed with severe sepsis or septic shock.      CK - Normal   RESPIRATORY PANEL PCR W/ COVID-19 (SARS-COV-2), NP SWAB IN UTM/VTP, 2 HR TAT   URINALYSIS W/ MICROSCOPIC IF INDICATED (NO CULTURE)   CBC AND DIFFERENTIAL    Narrative:     The following orders were created for panel order CBC & Differential.  Procedure                               Abnormality         Status                     ---------                               -----------         ------                     CBC Auto Differential[[id number]]        Abnormal            Final result                 Please view results for these tests on the individual orders.       EKG:   ECG 12 " Lead Syncope   Preliminary Result   HEART RATE=68  bpm   RR Selqxfnb=076  ms   MO Atdlweys=397  ms   P Horizontal Axis=  deg   P Front Axis=53  deg   QRSD Dtljfkxx=410  ms   QT Svpmzoyj=707  ms   ZEsR=020  ms   QRS Axis=-85  deg   T Wave Axis=58  deg   - ABNORMAL ECG -   Sinus rhythm   RBBB and LAFB   Date and Time of Study:2024-07-27 13:16:07          Meds given in ED:   Medications   lidocaine 1% - EPINEPHrine 1:196133 (XYLOCAINE W/EPI) 1 %-1:810113 injection 10 mL (has no administration in time range)   acetaminophen (TYLENOL) tablet 650 mg (has no administration in time range)     Or   acetaminophen (TYLENOL) 160 MG/5ML oral solution 650 mg (has no administration in time range)     Or   acetaminophen (TYLENOL) suppository 650 mg (has no administration in time range)   ondansetron ODT (ZOFRAN-ODT) disintegrating tablet 4 mg (has no administration in time range)     Or   ondansetron (ZOFRAN) injection 4 mg (has no administration in time range)   melatonin tablet 2.5 mg (has no administration in time range)   sennosides-docusate (PERICOLACE) 8.6-50 MG per tablet 2 tablet (has no administration in time range)     And   polyethylene glycol (MIRALAX) packet 17 g (has no administration in time range)     And   bisacodyl (DULCOLAX) EC tablet 5 mg (has no administration in time range)     And   bisacodyl (DULCOLAX) suppository 10 mg (has no administration in time range)   Tetanus-Diphth-Acell Pertussis (BOOSTRIX) injection 0.5 mL (0.5 mL Intramuscular Given 7/27/24 1431)   sodium chloride 0.9 % bolus 500 mL (0 mL Intravenous Stopped 7/27/24 1635)       Imaging results:  No radiology results for the last day    Ambulatory status:   - has not ambulated in ED    Social issues:   Social History     Socioeconomic History    Marital status:    Tobacco Use    Smoking status: Former     Current packs/day: 0.00     Average packs/day: 1 pack/day for 58.0 years (58.0 ttl pk-yrs)     Types: Cigarettes     Start date: 1955      Quit date:      Years since quittin.5     Passive exposure: Past    Smokeless tobacco: Never   Vaping Use    Vaping status: Never Used   Substance and Sexual Activity    Alcohol use: Yes     Comment: occasional    Drug use: Defer    Sexual activity: Defer       Peripheral Neurovascular  Peripheral Neurovascular (Adult)  Peripheral Neurovascular WDL: WDL    Neuro Cognitive  Neuro Cognitive (Adult)  Cognitive/Neuro/Behavioral WDL: WDL  NIH Stroke Scale  Interval: baseline  1a. Level of Consciousness: 0-->Alert, keenly responsive  1b. LOC Questions: 0-->Answers both questions correctly  1c. LOC Commands: 0-->Performs both tasks correctly  2. Best Gaze: 0-->Normal  3. Visual: 0-->No visual loss  4. Facial Palsy: 0-->Normal symmetrical movements  5a. Motor Arm, Left: 0-->No drift, limb holds 90 (or 45) degrees for full 10 secs  5b. Motor Arm, Right: 0-->No drift, limb holds 90 (or 45) degrees for full 10 secs  6a. Motor Leg, Left: 0-->No drift, leg holds 30 degree position for full 5 secs  6b. Motor Leg, Right: 0-->No drift, leg holds 30 degree position for full 5 secs  7. Limb Ataxia: 0-->Absent  8. Sensory: 0-->Normal, no sensory loss  9. Best Language: 0-->No aphasia, normal  10. Dysarthria: 0-->Normal  11. Extinction and Inattention (formerly Neglect): 0-->No abnormality  Total (NIH Stroke Scale): 0    Learning  Learning Assessment (Adult)  Learning Readiness and Ability: no barriers identified  Education Provided  Person Taught: patient    Respiratory  Respiratory WDL  Respiratory WDL: WDL    Abdominal Pain       Pain Assessments  Pain (Adult)  (0-10) Pain Rating: Rest: 2  Pain Location: head    NIH Stroke Scale  NIH Stroke Scale  Interval: baseline  1a. Level of Consciousness: 0-->Alert, keenly responsive  1b. LOC Questions: 0-->Answers both questions correctly  1c. LOC Commands: 0-->Performs both tasks correctly  2. Best Gaze: 0-->Normal  3. Visual: 0-->No visual loss  4. Facial Palsy: 0-->Normal  symmetrical movements  5a. Motor Arm, Left: 0-->No drift, limb holds 90 (or 45) degrees for full 10 secs  5b. Motor Arm, Right: 0-->No drift, limb holds 90 (or 45) degrees for full 10 secs  6a. Motor Leg, Left: 0-->No drift, leg holds 30 degree position for full 5 secs  6b. Motor Leg, Right: 0-->No drift, leg holds 30 degree position for full 5 secs  7. Limb Ataxia: 0-->Absent  8. Sensory: 0-->Normal, no sensory loss  9. Best Language: 0-->No aphasia, normal  10. Dysarthria: 0-->Normal  11. Extinction and Inattention (formerly Neglect): 0-->No abnormality  Total (NIH Stroke Scale): 0    Madina Eng RN  07/27/24 17:49 EDT

## 2024-07-27 NOTE — ED NOTES
Daughter at bedside reports that he fell around 7 am via a camera I the household, pt was sitting in chair without shirt on which was not normal   Pt did loose control of urine when this happened  Pt has a biopsy last week on the top of his head.

## 2024-07-27 NOTE — H&P
HISTORY AND PHYSICAL   [facility]        Date of Admission: 2024  Patient Identification:  Name: Magan Lubin  Age: 89 y.o.  Sex: male  :  1935  MRN: [id number]                     Primary Care Physician: Jo Ann Friend APRN    Chief Complaint:  89 year old gentleman was brought in after he fell at home; it occurred this morning around 7; his family has cameras installed so they can watch him; they noted that he was sitting up with a towel around his head; he denies syncope; he had a laceration on his posterior scalp and had bled profusely by the time family arrived; he is not sure how or why he fell; no recent fever or chills; he has a history of dementia and is not able to give a good history    History of Present Illness:   As above    Past Medical History:  Past Medical History:   Diagnosis Date    Hyperlipidemia     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     2023    Personal history of transient ischemic attack (TIA), and cerebral infarction without residual deficits     2023    Prostate cancer     Squamous cell carcinoma of head and neck      Past Surgical History:  Past Surgical History:   Procedure Laterality Date    APPENDECTOMY      HERNIA REPAIR      KNEE ARTHROPLASTY, PARTIAL REPLACEMENT Bilateral     OTHER SURGICAL HISTORY  2023    Carotid artery on 2023 TCAR      Home Meds:  (Not in a hospital admission)      Allergies:  Allergies   Allergen Reactions    Penicillins Unknown - Low Severity     Immunizations:  Immunization History   Administered Date(s) Administered    COVID-19 (MODERNA) 1st,2nd,3rd Dose Monovalent 2021, 2021, 2022    Fluad Quad 65+ 2020    Fluzone High Dose =>65 Years ([employer] ONLY) 2013, 10/01/2016, 2017, 2020    Fluzone High-Dose 65+yrs 2021, 2023    Pneumococcal Polysaccharide (PPSV23) 2013    Tdap 2024     Social History:   Social History  "    Social History Narrative    Not on file     Social History     Socioeconomic History    Marital status:    Tobacco Use    Smoking status: Former     Current packs/day: 0.00     Average packs/day: 1 pack/day for 58.0 years (58.0 ttl pk-yrs)     Types: Cigarettes     Start date:      Quit date:      Years since quittin.5     Passive exposure: Past    Smokeless tobacco: Never   Vaping Use    Vaping status: Never Used   Substance and Sexual Activity    Alcohol use: Yes     Comment: occasional    Drug use: Defer    Sexual activity: Defer       Family History:  Family History   Problem Relation Age of Onset    Heart disease Mother     Cancer Father         Review of Systems  Not obtainable from the patient    Objective:  T Max 24 hrs: Temp (24hrs), Av.3 °F (36.8 °C), Min:98.3 °F (36.8 °C), Max:98.3 °F (36.8 °C)    Vitals Ranges:   Temp:  [98.3 °F (36.8 °C)] 98.3 °F (36.8 °C)  Heart Rate:  [63-79] 70  Resp:  [16] 16  BP: (101-136)/(56-71) 105/57      Exam:  /57   Pulse 70   Temp 98.3 °F (36.8 °C) (Oral)   Resp 16   Ht 182.9 cm (72.01\")   Wt 73.1 kg (161 lb 2.5 oz)   SpO2 98%   BMI 21.85 kg/m²     General Appearance:    Alert, cooperative, no distress, appears stated age   Head:    Normocephalic, without obvious abnormality, atraumatic; repaired laceration   Eyes:    PERRL, conjunctivae/corneas clear, EOM's intact, both eyes   Ears:    Normal external ear canals, both ears   Nose:   Nares normal, septum midline, mucosa normal, no drainage    or sinus tenderness   Throat:   Lips, mucosa, and tongue normal   Neck:   Supple, symmetrical, trachea midline, no adenopathy;     thyroid:  no enlargement/tenderness/nodules; no carotid    bruit or JVD   Back:     Symmetric, no curvature, ROM normal, no CVA tenderness   Lungs:    Decreased breath sounds bilaterally, respirations unlabored   Chest Wall:    No tenderness or deformity    Heart:    Regular rate and rhythm, S1 and S2 normal, no " murmur, rub   or gallop   Abdomen:     Soft, nontender, bowel sounds active all four quadrants,     no masses, no hepatomegaly, no splenomegaly   Extremities:   Extremities normal, atraumatic, no cyanosis or edema                       .    Data Review:  Labs in chart were reviewed.  WBC   Date Value Ref Range Status   07/27/2024 6.84 3.40 - 10.80 10*3/mm3 Final     Hemoglobin   Date Value Ref Range Status   07/27/2024 10.1 (L) 13.0 - 17.7 g/dL Final     Hematocrit   Date Value Ref Range Status   07/27/2024 30.4 (L) 37.5 - 51.0 % Final     Platelets   Date Value Ref Range Status   07/27/2024 229 140 - 450 10*3/mm3 Final     Sodium   Date Value Ref Range Status   07/27/2024 139 136 - 145 mmol/L Final     Potassium   Date Value Ref Range Status   07/27/2024 4.2 3.5 - 5.2 mmol/L Final     Chloride   Date Value Ref Range Status   07/27/2024 106 98 - 107 mmol/L Final     CO2   Date Value Ref Range Status   07/27/2024 20.3 (L) 22.0 - 29.0 mmol/L Final     BUN   Date Value Ref Range Status   07/27/2024 18 8 - 23 mg/dL Final     Creatinine   Date Value Ref Range Status   07/27/2024 0.87 0.76 - 1.27 mg/dL Final     Glucose   Date Value Ref Range Status   07/27/2024 113 (H) 65 - 99 mg/dL Final     Calcium   Date Value Ref Range Status   07/27/2024 9.1 8.6 - 10.5 mg/dL Final                Imaging Results (All)       Procedure Component Value Units Date/Time    CT Cervical Spine Without Contrast [[id number]] Collected: 07/27/24 1520     Updated: 07/27/24 1523    Narrative:      The dictation for this CT examination of the cervical spine was included  in the report for the CT examination of the head.           Radiation dose reduction techniques were utilized, including automated  exposure control and exposure modulation based on body size.        This report was finalized on 7/27/2024 3:20 PM by Dr. Aleksandr Lucas M.D  on Workstation: [id number]       CT Head Without Contrast [[id number]] Collected: 07/27/24 1444     Updated:  07/27/24 1507    Narrative:      CT HEAD WO CONTRAST-, CT CERVICAL SPINE WITHOUT CONTRAST     HISTORY:  fall/syncope, head injury on anticoagulation, neck pain     COMPARISON: MRI brain and cervical spine 7/25/2023 and CT head  7/25/2023.     CT HEAD WITHOUT CONTRAST     There is age-appropriate atrophy. A remote infarct is appreciated  involving the periventricular white matter left frontal lobe and lateral  aspect of the head of the caudate nucleus on the left, present  previously. There is no evidence of fracture, intracranial hemorrhage or  of acute infarction/contusion. Moderate to severe vascular calcification  and mild to moderate small vessel ischemic disease is noted.     CT CERVICAL SPINE WITHOUT CONTRAST:     There is partial fusion of the C3 and C4 vertebral bodies. There is  solid osseous fusion of the facets to the right at C4-5 and to the left  at C3-4. There is no evidence of fracture.     C2-3: Moderate to severe facet degenerative disease is present on the  left.     C3-4: Mild central disc osteophyte complex is present with evidence of  herniation.     C4-5: There is no evidence of disc bulge or herniation.     C5-6: A far lateral disc osteophyte complex is present to the left.     C6-7: There is no evidence of disc bulge or herniation.     C7-T1: There is no evidence of a disc bulge or herniation.     This report was finalized on 7/27/2024 3:04 PM by Dr. Aleksandr Lucas M.D  on Workstation: [id number]       XR Knee 1 or 2 View Right [[id number]] Collected: 07/27/24 1349     Updated: 07/27/24 1352    Narrative:      RIGHT KNEE     HISTORY: Fall, pain.     COMPARISON: None.     FINDINGS: 2 views of the right knee demonstrates a right knee prosthesis  in place with evidence of fracture or of a suprapatellar fluid  collection. Moderate to severe vascular calcification is noted.     This report was finalized on 7/27/2024 1:49 PM by Dr. Aleksandr Lucas M.D  on Workstation: [id number]       XR Chest 1  View [[id number]] Collected: 07/27/24 1315     Updated: 07/27/24 1318    Narrative:      XR CHEST 1 VW-     CLINICAL HISTORY:  syncope     COMPARISON: Chest x-ray 7/25/2023     FINDINGS: A single portable view of the chest demonstrates the heart to  be within normal limits in size. There is mild bibasilar  atelectasis/infiltrate appreciated which is new versus prior  examination. There is no evidence of consolidation, gross effusion or of  congestive failure.           This report was finalized on 7/27/2024 1:15 PM by Dr. Aleksandr Lucas M.D  on Workstation: [id number]                 Assessment:  Active Hospital Problems    Diagnosis  POA    **Closed head injury [S09.90XA]  Yes    Altered mental status [R41.82]  Yes      Resolved Hospital Problems   No resolved problems to display.   Dementia  Anemia  Scalp laceration    Plan:  Monitor on telemetry  Trend labs  Hold eliquis and plavix  Pt/ot to see  Dw patient, family and ed provider    Ree Abad MD  7/27/2024  19:18 EDT

## 2024-07-27 NOTE — ED PROVIDER NOTES
EMERGENCY DEPARTMENT MD ATTESTATION NOTE    Room Number:  39/39  PCP: Jo Ann Friend APRN  Independent Historians: Patient and EMS    HPI:  A complete HPI/ROS/PMH/PSH/SH/FH are unobtainable due to: Poor historian    Chronic or social conditions impacting patient care (Social Determinants of Health): None      Context: Magan Lubin is a 89 y.o. male with a medical history of hyperlipidemia, prostate cancer who presents to the ED c/o acute fall.  Family reports that the patient fell around 7 AM.  They can monitor him via video camera.  He was sitting in the chair without a shirt on which is not normal.  The patient is not able to provide much history.  He was found to have trauma to the top of his head.        Review of prior external notes (non-ED) -and- Review of prior external test results outside of this encounter:  Cardiology note dated 7/1/2024 where he was in regular rhythm with amiodarone.  He is anticoagulated.    Prescription drug monitoring program review:           PHYSICAL EXAM    I have reviewed the triage vital signs and nursing notes.    ED Triage Vitals   Temp Heart Rate Resp BP SpO2   -- 07/27/24 1242 07/27/24 1248 07/27/24 1242 07/27/24 1242    66 16 136/71 99 %      Temp src Heart Rate Source Patient Position BP Location FiO2 (%)   -- 07/27/24 1351 07/27/24 1351 -- --    Monitor Lying         Physical Exam  GENERAL: Awake, alert, no acute distress  SKIN: Warm, dry  HENT: Normocephalic, wound to the occiput.  Dried blood.  EYES: no scleral icterus  CV: regular rhythm, regular rate  RESPIRATORY: normal effort, lungs clear  ABDOMEN: soft, nontender, nondistended  MUSCULOSKELETAL: no deformity.  No extremity tenderness.  No tenderness of the hips.  No tenderness to the chest wall.  NEURO: alert, moves all extremities, follows commands            MEDICATIONS GIVEN IN ER  Medications   lidocaine 1% - EPINEPHrine 1:[id number] (XYLOCAINE W/EPI) 1 %-1:[id number] injection 10 mL (has no administration in time  range)   Tetanus-Diphth-Acell Pertussis (BOOSTRIX) injection 0.5 mL (0.5 mL Intramuscular Given 7/27/24 1431)   sodium chloride 0.9 % bolus 500 mL (0 mL Intravenous Stopped 7/27/24 1635)         ORDERS PLACED DURING THIS VISIT:  Orders Placed This Encounter   Procedures    Respiratory Panel PCR w/COVID-19(SARS-CoV-2) [facility] In-House, NP Swab in UTM/VTM, 2 HR TAT - Swab, Nasopharynx    XR Chest 1 View    CT Head Without Contrast    CT Cervical Spine Without Contrast    XR Knee 1 or 2 View Right    Comprehensive Metabolic Panel    Urinalysis With Microscopic If Indicated (No Culture) - Urine, Clean Catch    High Sensitivity Troponin T    CBC Auto Differential    Manual Differential    High Sensitivity Troponin T 2Hr    Procalcitonin    CK    Monitor Blood Pressure    Continuous Pulse Oximetry    [facility] (on-call MD unless specified) Details    ECG 12 Lead Syncope    Initiate Observation Status    CBC & Differential         PROCEDURES  Procedures            PROGRESS, DATA ANALYSIS, CONSULTS, AND MEDICAL DECISION MAKING  All labs have been independently interpreted by me.  All radiology studies have been reviewed by me. All EKG's have been independently viewed and interpreted by me.  Discussion below represents my analysis of pertinent findings related to patient's condition, differential diagnosis, treatment plan and final disposition.    Differential diagnosis includes but is not limited to intracranial hemorrhage, UTI, sepsis, pneumonia, rhabdomyolysis, renal failure, spine fracture.    Clinical Scores:            Total (NIH Stroke Scale): 0      ED Course as of 07/27/24 1739   Sat Jul 27, 2024   1354 XR Chest 1 View  My independent interpretation of the imaging study is no pneumothorax [TR]   1525 EKG          EKG time: 1316  Rhythm/Rate: Normal sinus, rate 68  P waves and IA: Normal P, normal IA  QRS, axis: Right bundle branch block  ST and T waves: No acute    Independently Interpreted by me  Not  significantly changed compared to prior 7/25/2023   [TR]   1738 Discussing with Dr. Abad with A.  She agrees to admit. [TR]      ED Course User Index  [TR] Luis Andrews MD       MDM: Plan to obtain chemistries and blood counts as well as imaging of his head and cervical spine.  He will need wound closure.      COMPLEXITY OF CARE  The patient requires admission.    Please note that portions of this document were completed with a voice recognition program.    Note Disclaimer: At [facility], we believe that sharing information builds trust and better relationships. You are receiving this note because you recently visited [facility]. It is possible you will see health information before a provider has talked with you about it. This kind of information can be easy to misunderstand. To help you fully understand what it means for your health, we urge you to discuss this note with your provider.         Luis Andrews MD  07/27/24 4698

## 2024-07-27 NOTE — ED PROVIDER NOTES
EMERGENCY DEPARTMENT ENCOUNTER  Room Number:  39/39  PCP: Jo Ann Friend APRN  Independent Historians: Patient and Family      HPI:  Chief Complaint: had concerns including Fall ([place] [id number]; unwitnessed fall found bound family at home lives with wife).       A complete HPI/ROS/PMH/PSH/SH/FH are unobtainable due to: Poor historian and Dementia    Chronic or social conditions impacting patient care (Social Determinants of Health): None      Context: Magan Lubin is a 89 y.o. male with a medical history of atrial fibrillation on anticoagulation, dementia who presents to the ED c/o acute unwitnessed fall and head injury.  He has a history of dementia, lives at home with his wife.  His daughters have cameras in the home.  They noted around 11 AM that he was sitting in a chair with a towel wrapped around his head.  They checked on him and he had had head trauma.  He does not know what happened, does not remember, this is his baseline.  He denies any headache, does have neck pain, denies any back chest or abdominal pain.  He does have some pain in his right knee.  He is at his mental baseline per daughter.  She states he was incontinent of urine while she was there.      Review of prior external notes (non-ED) -and- Review of prior external test results outside of this encounter:  Patient was admitted to [facility] in June 2020 for for small bowel obstruction due to an incarcerated hernia, tolerated the procedure well, developed A-fib postoperatively as well as postoperative ileus.        PAST MEDICAL HISTORY  Active Ambulatory Problems     Diagnosis Date Noted    Bilateral carotid artery stenosis 04/06/2022    History of prostate cancer 08/25/2022    Benign prostatic hyperplasia without lower urinary tract symptoms 08/25/2022    Left upper extremity numbness 07/25/2023    Symptomatic carotid artery stenosis 07/26/2023    Ischemic stroke 07/26/2023    Left upper lobe pulmonary nodule 07/28/2023     Internal carotid artery stent present 2023    Former smoker 2023    Gastroesophageal reflux disease without esophagitis 2023    History of stroke 2023    Generalized weakness 2023    Localized edema 2024    Acquired hypothyroidism 2024    Confusion 2024    Memory loss 2024    Medicare annual wellness visit, subsequent 2024    Impacted cerumen of right ear 2024    Paroxysmal atrial fibrillation 2024    Ileus 2024    History of stroke within last year 2024    Scalp lesion 2024     Resolved Ambulatory Problems     Diagnosis Date Noted    Acute nonintractable headache 2022    Asymptomatic carotid artery stenosis 2022    Peripheral vascular disease with claudication 2022    Benign prostatic hyperplasia with lower urinary tract symptoms 2022    Prostate cancer 2022    Acute thyroiditis 2023    Elevated TSH 2023    Need for influenza vaccination 2023     Past Medical History:   Diagnosis Date    Hyperlipidemia     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     Personal history of transient ischemic attack (TIA), and cerebral infarction without residual deficits     Squamous cell carcinoma of head and neck          PAST SURGICAL HISTORY  Past Surgical History:   Procedure Laterality Date    APPENDECTOMY      HERNIA REPAIR      KNEE ARTHROPLASTY, PARTIAL REPLACEMENT Bilateral     OTHER SURGICAL HISTORY  2023    Carotid artery on 2023 TCAR         FAMILY HISTORY  Family History   Problem Relation Age of Onset    Heart disease Mother     Cancer Father          SOCIAL HISTORY  Social History     Socioeconomic History    Marital status:    Tobacco Use    Smoking status: Former     Current packs/day: 0.00     Average packs/day: 1 pack/day for 58.0 years (58.0 ttl pk-yrs)     Types: Cigarettes     Start date:      Quit date:      Years since quittin.5      Passive exposure: Past    Smokeless tobacco: Never   Vaping Use    Vaping status: Never Used   Substance and Sexual Activity    Alcohol use: Yes     Comment: occasional    Drug use: Defer    Sexual activity: Defer         ALLERGIES  Penicillins      REVIEW OF SYSTEMS  Review of Systems  Included in HPI  All systems reviewed and negative except for those discussed in HPI.      PHYSICAL EXAM    I have reviewed the triage vital signs and nursing notes.    ED Triage Vitals   Temp Heart Rate Resp BP SpO2   -- 07/27/24 1242 07/27/24 1248 07/27/24 1242 07/27/24 1242    66 16 136/71 99 %      Temp src Heart Rate Source Patient Position BP Location FiO2 (%)   -- -- -- -- --              Physical Exam  GENERAL: alert, no acute distress  SKIN: Warm, dry  HENT: Normocephalic, large amount of dried blood on the scalp and in the hair, appears to be a scalp wound in the right posterior parietal area.  No hemotympanum Clancy sign raccoon eyes septal hematoma otorrhea or rhinorrhea.  EYES: no scleral icterus, PERRL, extraocular movements intact  CV: regular rhythm, regular rate  RESPIRATORY: normal effort, lungs clear, no chest wall tenderness  ABDOMEN: nondistended soft nontender normal bowel sounds no guarding or rigidity  MUSCULOSKELETAL: no deformity, no C, T, L-spine tenderness.  He has some mild pain with range of motion of the knee but is able to perform, no knee deformity ecchymosis effusion or tenderness.  Remaining extremities are atraumatic and nontender with age-appropriate range of motion  NEURO: alert, moves all extremities, follows commands            LAB RESULTS  Labs Reviewed   COMPREHENSIVE METABOLIC PANEL - Abnormal; Notable for the following components:       Result Value    Glucose 113 (*)     CO2 20.3 (*)     All other components within normal limits    Narrative:     GFR Normal >60  Chronic Kidney Disease <60  Kidney Failure <15    The GFR formula is only valid for adults with stable renal function between  ages 18 and 70.   URINALYSIS W/ MICROSCOPIC IF INDICATED (NO CULTURE) - Abnormal; Notable for the following components:    Ketones, UA Trace (*)     Blood, UA Trace (*)     All other components within normal limits   TROPONIN - Abnormal; Notable for the following components:    HS Troponin T 35 (*)     All other components within normal limits    Narrative:     High Sensitive Troponin T Reference Range:  <14.0 ng/L- Negative Female for AMI  <22.0 ng/L- Negative Male for AMI  >=14 - Abnormal Female indicating possible myocardial injury.  >=22 - Abnormal Male indicating possible myocardial injury.   Clinicians would have to utilize clinical acumen, EKG, Troponin, and serial changes to determine if it is an Acute Myocardial Infarction or myocardial injury due to an underlying chronic condition.        CBC WITH AUTO DIFFERENTIAL - Abnormal; Notable for the following components:    RBC 2.87 (*)     Hemoglobin 10.1 (*)     Hematocrit 30.4 (*)     .9 (*)     MCH 35.2 (*)     All other components within normal limits   MANUAL DIFFERENTIAL - Abnormal; Notable for the following components:    Neutrophil % 87.0 (*)     Lymphocyte % 9.0 (*)     Monocyte % 4.0 (*)     Eosinophil % 0.0 (*)     Lymphocytes Absolute 0.62 (*)     All other components within normal limits   HIGH SENSITIVITIY TROPONIN T 2HR - Abnormal; Notable for the following components:    HS Troponin T 25 (*)     Troponin T Delta -10 (*)     All other components within normal limits    Narrative:     High Sensitive Troponin T Reference Range:  <14.0 ng/L- Negative Female for AMI  <22.0 ng/L- Negative Male for AMI  >=14 - Abnormal Female indicating possible myocardial injury.  >=22 - Abnormal Male indicating possible myocardial injury.   Clinicians would have to utilize clinical acumen, EKG, Troponin, and serial changes to determine if it is an Acute Myocardial Infarction or myocardial injury due to an underlying chronic condition.        BASIC METABOLIC  PANEL - Abnormal; Notable for the following components:    Glucose 108 (*)     Creatinine 0.67 (*)     Chloride 111 (*)     BUN/Creatinine Ratio 28.4 (*)     All other components within normal limits    Narrative:     GFR Normal >60  Chronic Kidney Disease <60  Kidney Failure <15    The GFR formula is only valid for adults with stable renal function between ages 18 and 70.   CBC (NO DIFF) - Abnormal; Notable for the following components:    RBC 2.37 (*)     Hemoglobin 8.1 (*)     Hematocrit 24.9 (*)     .1 (*)     MCH 34.2 (*)     All other components within normal limits   TSH - Abnormal; Notable for the following components:    TSH 6.810 (*)     All other components within normal limits   HEMOGLOBIN A1C - Abnormal; Notable for the following components:    Hemoglobin A1C 5.70 (*)     All other components within normal limits    Narrative:     Hemoglobin A1C Ranges:    Increased Risk for Diabetes  5.7% to 6.4%  Diabetes                     >= 6.5%  Diabetic Goal                < 7.0%   FERRITIN - Abnormal; Notable for the following components:    Ferritin 446.00 (*)     All other components within normal limits    Narrative:     Results may be falsely decreased if patient taking Biotin.     IRON PROFILE - Abnormal; Notable for the following components:    Iron 50 (*)     Transferrin 148 (*)     TIBC 221 (*)     All other components within normal limits   RETICULOCYTES - Abnormal; Notable for the following components:    Reticulocyte % 2.22 (*)     All other components within normal limits   RESPIRATORY PANEL PCR W/ COVID-19 (SARS-COV-2), NP SWAB IN UTM/VTP, 2 HR TAT - Normal    Narrative:     In the setting of a positive respiratory panel with a viral infection PLUS a negative procalcitonin without other underlying concern for bacterial infection, consider observing off antibiotics or discontinuation of antibiotics and continue supportive care. If the respiratory panel is positive for atypical bacterial  "infection (Bordetella pertussis, Chlamydophila pneumoniae, or Mycoplasma pneumoniae), consider antibiotic de-escalation to target atypical bacterial infection.   PROCALCITONIN - Normal    Narrative:     As a Marker for Sepsis (Non-Neonates):    1. <0.5 ng/mL represents a low risk of severe sepsis and/or septic shock.  2. >2 ng/mL represents a high risk of severe sepsis and/or septic shock.    As a Marker for Lower Respiratory Tract Infections that require antibiotic therapy:    PCT on Admission    Antibiotic Therapy       6-12 Hrs later    >0.5                Strongly Recommended  >0.25 - <0.5        Recommended   0.1 - 0.25          Discouraged              Remeasure/reassess PCT  <0.1                Strongly Discouraged     Remeasure/reassess PCT    As 28 day mortality risk marker: \"Change in Procalcitonin Result\" (>80% or <=80%) if Day 0 (or Day 1) and Day 4 values are available. Refer to http://[email or web address]-calculator.[email or web address]    Change in PCT <=80%  A decrease of PCT levels below or equal to 80% defines a positive change in PCT test result representing a higher risk for 28-day all-cause mortality of patients diagnosed with severe sepsis for septic shock.    Change in PCT >80%  A decrease of PCT levels of more than 80% defines a negative change in PCT result representing a lower risk for 28-day all-cause mortality of patients diagnosed with severe sepsis or septic shock.      CK - Normal   VITAMIN B12 - Normal    Narrative:     Results may be falsely increased if patient taking Biotin.     FOLATE - Normal    Narrative:     Results may be falsely increased if patient taking Biotin.     HAPTOGLOBIN - Normal   LACTATE DEHYDROGENASE - Normal   URINALYSIS, MICROSCOPIC ONLY   OCCULT BLOOD X 1, STOOL   CBC AND DIFFERENTIAL    Narrative:     The following orders were created for panel order CBC & Differential.  Procedure                               Abnormality         Status                     ---------                  "              -----------         ------                     CBC Auto Differential[[id number]]        Abnormal            Final result                 Please view results for these tests on the individual orders.           RADIOLOGY  CT Cervical Spine Without Contrast    Result Date: 7/27/2024  Narrative: The dictation for this CT examination of the cervical spine was included in the report for the CT examination of the head.    Radiation dose reduction techniques were utilized, including automated exposure control and exposure modulation based on body size.   This report was finalized on 7/27/2024 3:20 PM by Dr. Aleksandr Lucas M.D on Workstation: [id number]      CT Head Without Contrast    Result Date: 7/27/2024  Narrative: CT HEAD WO CONTRAST-, CT CERVICAL SPINE WITHOUT CONTRAST  HISTORY:  fall/syncope, head injury on anticoagulation, neck pain  COMPARISON: MRI brain and cervical spine 7/25/2023 and CT head 7/25/2023.  CT HEAD WITHOUT CONTRAST  There is age-appropriate atrophy. A remote infarct is appreciated involving the periventricular white matter left frontal lobe and lateral aspect of the head of the caudate nucleus on the left, present previously. There is no evidence of fracture, intracranial hemorrhage or of acute infarction/contusion. Moderate to severe vascular calcification and mild to moderate small vessel ischemic disease is noted.  CT CERVICAL SPINE WITHOUT CONTRAST:  There is partial fusion of the C3 and C4 vertebral bodies. There is solid osseous fusion of the facets to the right at C4-5 and to the left at C3-4. There is no evidence of fracture.  C2-3: Moderate to severe facet degenerative disease is present on the left.  C3-4: Mild central disc osteophyte complex is present with evidence of herniation.  C4-5: There is no evidence of disc bulge or herniation.  C5-6: A far lateral disc osteophyte complex is present to the left.  C6-7: There is no evidence of disc bulge or herniation.  C7-T1: There  is no evidence of a disc bulge or herniation.  This report was finalized on 7/27/2024 3:04 PM by Dr. Aleksandr Lucas M.D on Workstation: [id number]      XR Knee 1 or 2 View Right    Result Date: 7/27/2024  Narrative: RIGHT KNEE  HISTORY: Fall, pain.  COMPARISON: None.  FINDINGS: 2 views of the right knee demonstrates a right knee prosthesis in place with evidence of fracture or of a suprapatellar fluid collection. Moderate to severe vascular calcification is noted.  This report was finalized on 7/27/2024 1:49 PM by Dr. Aleksandr Lucas M.D on Workstation: [id number]      XR Chest 1 View    Result Date: 7/27/2024  Narrative: XR CHEST 1 VW-  CLINICAL HISTORY:  syncope  COMPARISON: Chest x-ray 7/25/2023  FINDINGS: A single portable view of the chest demonstrates the heart to be within normal limits in size. There is mild bibasilar atelectasis/infiltrate appreciated which is new versus prior examination. There is no evidence of consolidation, gross effusion or of congestive failure.    This report was finalized on 7/27/2024 1:15 PM by Dr. Aleksandr Lucas M.D on Workstation: [id number]           MEDICATIONS GIVEN IN ER  Medications   Tetanus-Diphth-Acell Pertussis (BOOSTRIX) injection 0.5 mL (has no administration in time range)   lidocaine 1% - EPINEPHrine 1:289760 (XYLOCAINE W/EPI) 1 %-1:009196 injection 10 mL (has no administration in time range)         ORDERS PLACED DURING THIS VISIT:  Orders Placed This Encounter   Procedures    XR Chest 1 View    CT Head Without Contrast    CT Cervical Spine Without Contrast    Comprehensive Metabolic Panel    Urinalysis With Microscopic If Indicated (No Culture) - Urine, Clean Catch    High Sensitivity Troponin T    CBC Auto Differential    Monitor Blood Pressure    Continuous Pulse Oximetry    ECG 12 Lead Syncope    CBC & Differential         OUTPATIENT MEDICATION MANAGEMENT:  Current Facility-Administered Medications Ordered in Epic   Medication Dose Route Frequency Provider  Last Rate Last Admin    lidocaine 1% - EPINEPHrine 1:265377 (XYLOCAINE W/EPI) 1 %-1:520962 injection 10 mL  10 mL Injection Once Licha Maynard PA-C        Tetanus-Diphth-Acell Pertussis (BOOSTRIX) injection 0.5 mL  0.5 mL Intramuscular Once Licha Maynard PA-C         Current Outpatient Medications Ordered in Epic   Medication Sig Dispense Refill    amiodarone (PACERONE) 200 MG tablet Take 1 tablet by mouth Daily.      apixaban (ELIQUIS) 5 MG tablet tablet Take 1 tablet by mouth 2 (Two) Times a Day.      aspirin 81 MG EC tablet Take 1 tablet by mouth Daily.      atorvastatin (LIPITOR) 80 MG tablet Take 1 tablet by mouth Every Night. 90 tablet 1    clopidogrel (PLAVIX) 75 MG tablet Take 1 tablet by mouth Daily. 30 tablet 11    docusate sodium 100 MG capsule Take 1 capsule by mouth 2 (Two) Times a Day. (Patient not taking: Reported on 5/28/2024) 30 capsule 0    escitalopram (Lexapro) 10 MG tablet Take 1 tablet by mouth Daily. 30 tablet 5    furosemide (Lasix) 20 MG tablet Take 1 tablet by mouth Daily As Needed (edema). 60 tablet 1    levothyroxine (SYNTHROID, LEVOTHROID) 25 MCG tablet Take 1 tablet by mouth Every Morning. 90 tablet 1    megestrol (MEGACE) 20 MG tablet Take 1 tablet by mouth Daily.      ondansetron ODT (ZOFRAN-ODT) 4 MG disintegrating tablet Take 1 tablet by mouth.      pantoprazole (PROTONIX) 40 MG EC tablet Take 1 tablet by mouth Every Morning. 90 tablet 1    potassium chloride 10 MEQ CR tablet Take 2 tablets once daily prn on  days that lasix is taken 60 tablet 1    terazosin (HYTRIN) 2 MG capsule Take 1 capsule by mouth Daily. 90 capsule 1         PROCEDURES  Laceration Repair    Date/Time: 7/27/2024 5:54 PM    Performed by: Licha Maynard PA-C  Authorized by: Luis Andrews MD    Consent:     Consent obtained:  Verbal    Consent given by:  Patient    Risks discussed:  Pain and poor cosmetic result  Anesthesia:     Anesthesia method:  Local infiltration    Local anesthetic:  Lidocaine 1%  WITH epi  Laceration details:     Location:  Scalp    Scalp location:  R parietal    Length (cm):  3  Pre-procedure details:     Preparation:  Patient was prepped and draped in usual sterile fashion  Exploration:     Limited defect created (wound extended): no      Hemostasis achieved with:  Epinephrine and direct pressure    Wound exploration: entire depth of wound visualized      Wound extent: no fascia violation noted, no foreign bodies/material noted and no underlying fracture noted      Contaminated: no    Treatment:     Area cleansed with:  Chlorhexidine    Amount of cleaning:  Extensive    Irrigation solution:  Sterile saline    Irrigation method:  Syringe  Skin repair:     Repair method:  Staples    Number of staples:  5  Approximation:     Approximation:  Close  Repair type:     Repair type:  Simple  Post-procedure details:     Dressing:  Open (no dressing)    Procedure completion:  Tolerated              PROGRESS, DATA ANALYSIS, CONSULTS, AND MEDICAL DECISION MAKING  All labs have been independently interpreted by me.  All radiology studies have been reviewed by me. All EKG's have been independently viewed and interpreted by me.  Discussion below represents my analysis of pertinent findings related to patient's condition, differential diagnosis, treatment plan and final disposition.    DIFFERENTIAL    Differential diagnosis includes was not limited to mechanical fall, syncope, intracranial hemorrhage, head contusion, occult C-spine fracture, cervical strain    Clinical Scores:           Total (NIH Stroke Scale): 0      ED Course as of 07/27/24 1753   Sat Jul 27, 2024   1354 XR Chest 1 View  My independent interpretation of the imaging study is no pneumothorax [TR]   1525 EKG          EKG time: 1316  Rhythm/Rate: Normal sinus, rate 68  P waves and NH: Normal P, normal NH  QRS, axis: Right bundle branch block  ST and T waves: No acute    Independently Interpreted by me  Not significantly changed compared to  prior 7/25/2023   [TR]   1738 Discussing with Dr. Abad with A.  She agrees to admit. [TR]      ED Course User Index  [TR] Lius Andrews MD             AS OF 13:11 EDT VITALS:    BP - 136/71  HR - 71  TEMP -    O2 SATS - 99%    COMPLEXITY OF CARE  The patient requires admission.      DIAGNOSIS  Final diagnoses:   Closed head injury, initial encounter   Laceration of scalp, initial encounter   Altered mental status, unspecified altered mental status type         DISPOSITION  ED Disposition       ED Disposition   Decision to Admit    Condition   --    Comment   Level of Care: Telemetry [5]   Diagnosis: Altered mental status [780.97.ICD-9-CM]   Admitting Physician: SIVA ABAD [[id number]]   Attending Physician: SIVA ABAD [[id number]]                            Please note that portions of this document were completed with a voice recognition program.    Note Disclaimer: At [facility], we believe that sharing information builds trust and better relationships. You are receiving this note because you recently visited [facility]. It is possible you will see health information before a provider has talked with you about it. This kind of information can be easy to misunderstand. To help you fully understand what it means for your health, we urge you to discuss this note with your provider.         Licha Maynard PA-C  07/28/24 [id number]

## 2024-07-27 NOTE — Clinical Note
Level of Care: Telemetry [5]   Diagnosis: Closed head injury [496040]   Admitting Physician: SIVA AMBRIZ [[id number]]   Attending Physician: SIVA AMBRIZ [[id number]]
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
## 2024-07-28 PROBLEM — E78.5 HYPERLIPIDEMIA: Status: ACTIVE | Noted: 2024-07-28

## 2024-07-28 PROBLEM — Z85.46 HISTORY OF PROSTATE CANCER: Status: ACTIVE | Noted: 2024-07-28

## 2024-07-28 PROBLEM — F03.90 DEMENTIA WITHOUT BEHAVIORAL DISTURBANCE: Status: ACTIVE | Noted: 2024-07-28

## 2024-07-28 PROBLEM — I10 HYPERTENSION: Status: ACTIVE | Noted: 2024-07-28

## 2024-07-28 PROBLEM — I48.91 ATRIAL FIBRILLATION: Status: ACTIVE | Noted: 2024-06-17

## 2024-07-28 LAB
ANION GAP SERPL CALCULATED.3IONS-SCNC: 7 MMOL/L (ref 5–15)
B PARAPERT DNA SPEC QL NAA+PROBE: NOT DETECTED
B PERT DNA SPEC QL NAA+PROBE: NOT DETECTED
BUN SERPL-MCNC: 19 MG/DL (ref 8–23)
BUN/CREAT SERPL: 28.4 (ref 7–25)
C PNEUM DNA NPH QL NAA+NON-PROBE: NOT DETECTED
CALCIUM SPEC-SCNC: 8.6 MG/DL (ref 8.6–10.5)
CHLORIDE SERPL-SCNC: 111 MMOL/L (ref 98–107)
CO2 SERPL-SCNC: 22 MMOL/L (ref 22–29)
CREAT SERPL-MCNC: 0.67 MG/DL (ref 0.76–1.27)
DEPRECATED RDW RBC AUTO: 50.9 FL (ref 37–54)
EGFRCR SERPLBLD CKD-EPI 2021: 90.4 ML/MIN/1.73
ERYTHROCYTE [DISTWIDTH] IN BLOOD BY AUTOMATED COUNT: 13.2 % (ref 12.3–15.4)
FERRITIN SERPL-MCNC: 446 NG/ML (ref 30–400)
FLUAV SUBTYP SPEC NAA+PROBE: NOT DETECTED
FLUBV RNA ISLT QL NAA+PROBE: NOT DETECTED
FOLATE SERPL-MCNC: 5.54 NG/ML (ref 4.78–24.2)
GLUCOSE SERPL-MCNC: 108 MG/DL (ref 65–99)
HADV DNA SPEC NAA+PROBE: NOT DETECTED
HAPTOGLOB SERPL-MCNC: 117 MG/DL (ref 30–200)
HBA1C MFR BLD: 5.7 % (ref 4.8–5.6)
HCOV 229E RNA SPEC QL NAA+PROBE: NOT DETECTED
HCOV HKU1 RNA SPEC QL NAA+PROBE: NOT DETECTED
HCOV NL63 RNA SPEC QL NAA+PROBE: NOT DETECTED
HCOV OC43 RNA SPEC QL NAA+PROBE: NOT DETECTED
HCT VFR BLD AUTO: 24.9 % (ref 37.5–51)
HGB BLD-MCNC: 8.1 G/DL (ref 13–17.7)
HMPV RNA NPH QL NAA+NON-PROBE: NOT DETECTED
HPIV1 RNA ISLT QL NAA+PROBE: NOT DETECTED
HPIV2 RNA SPEC QL NAA+PROBE: NOT DETECTED
HPIV3 RNA NPH QL NAA+PROBE: NOT DETECTED
HPIV4 P GENE NPH QL NAA+PROBE: NOT DETECTED
IRON 24H UR-MRATE: 50 MCG/DL (ref 59–158)
IRON SATN MFR SERPL: 23 % (ref 20–50)
LDH SERPL-CCNC: 156 U/L (ref 135–225)
M PNEUMO IGG SER IA-ACNC: NOT DETECTED
MCH RBC QN AUTO: 34.2 PG (ref 26.6–33)
MCHC RBC AUTO-ENTMCNC: 32.5 G/DL (ref 31.5–35.7)
MCV RBC AUTO: 105.1 FL (ref 79–97)
PLATELET # BLD AUTO: 220 10*3/MM3 (ref 140–450)
PMV BLD AUTO: 9.2 FL (ref 6–12)
POTASSIUM SERPL-SCNC: 3.7 MMOL/L (ref 3.5–5.2)
QT INTERVAL: 454 MS
QTC INTERVAL: 483 MS
RBC # BLD AUTO: 2.37 10*6/MM3 (ref 4.14–5.8)
RETICS # AUTO: 0.05 10*6/MM3 (ref 0.02–0.13)
RETICS/RBC NFR AUTO: 2.22 % (ref 0.7–1.9)
RHINOVIRUS RNA SPEC NAA+PROBE: NOT DETECTED
RSV RNA NPH QL NAA+NON-PROBE: NOT DETECTED
SARS-COV-2 RNA NPH QL NAA+NON-PROBE: NOT DETECTED
SODIUM SERPL-SCNC: 140 MMOL/L (ref 136–145)
TIBC SERPL-MCNC: 221 MCG/DL (ref 298–536)
TRANSFERRIN SERPL-MCNC: 148 MG/DL (ref 200–360)
TSH SERPL DL<=0.05 MIU/L-ACNC: 6.81 UIU/ML (ref 0.27–4.2)
VIT B12 BLD-MCNC: 388 PG/ML (ref 211–946)
WBC NRBC COR # BLD AUTO: 7.02 10*3/MM3 (ref 3.4–10.8)

## 2024-07-28 PROCEDURE — 83540 ASSAY OF IRON: CPT | Performed by: INTERNAL MEDICINE

## 2024-07-28 PROCEDURE — 82746 ASSAY OF FOLIC ACID SERUM: CPT | Performed by: INTERNAL MEDICINE

## 2024-07-28 PROCEDURE — 97166 OT EVAL MOD COMPLEX 45 MIN: CPT

## 2024-07-28 PROCEDURE — 84466 ASSAY OF TRANSFERRIN: CPT | Performed by: INTERNAL MEDICINE

## 2024-07-28 PROCEDURE — G0378 HOSPITAL OBSERVATION PER HR: HCPCS

## 2024-07-28 PROCEDURE — 97530 THERAPEUTIC ACTIVITIES: CPT

## 2024-07-28 PROCEDURE — 82728 ASSAY OF FERRITIN: CPT | Performed by: INTERNAL MEDICINE

## 2024-07-28 PROCEDURE — 80048 BASIC METABOLIC PNL TOTAL CA: CPT | Performed by: INTERNAL MEDICINE

## 2024-07-28 PROCEDURE — 85027 COMPLETE CBC AUTOMATED: CPT | Performed by: INTERNAL MEDICINE

## 2024-07-28 PROCEDURE — 85045 AUTOMATED RETICULOCYTE COUNT: CPT | Performed by: INTERNAL MEDICINE

## 2024-07-28 PROCEDURE — 83036 HEMOGLOBIN GLYCOSYLATED A1C: CPT | Performed by: INTERNAL MEDICINE

## 2024-07-28 PROCEDURE — 82607 VITAMIN B-12: CPT | Performed by: INTERNAL MEDICINE

## 2024-07-28 PROCEDURE — 83010 ASSAY OF HAPTOGLOBIN QUANT: CPT | Performed by: INTERNAL MEDICINE

## 2024-07-28 PROCEDURE — 97162 PT EVAL MOD COMPLEX 30 MIN: CPT

## 2024-07-28 PROCEDURE — 83615 LACTATE (LD) (LDH) ENZYME: CPT | Performed by: INTERNAL MEDICINE

## 2024-07-28 PROCEDURE — 84443 ASSAY THYROID STIM HORMONE: CPT | Performed by: INTERNAL MEDICINE

## 2024-07-28 PROCEDURE — 97535 SELF CARE MNGMENT TRAINING: CPT

## 2024-07-28 PROCEDURE — 36415 COLL VENOUS BLD VENIPUNCTURE: CPT | Performed by: INTERNAL MEDICINE

## 2024-07-28 PROCEDURE — 0202U NFCT DS 22 TRGT SARS-COV-2: CPT | Performed by: PHYSICIAN ASSISTANT

## 2024-07-28 RX ORDER — MEGESTROL ACETATE 20 MG/1
20 TABLET ORAL DAILY
Status: DISCONTINUED | OUTPATIENT
Start: 2024-07-28 | End: 2024-07-29 | Stop reason: HOSPADM

## 2024-07-28 RX ORDER — TERAZOSIN 2 MG/1
2 CAPSULE ORAL DAILY
Status: DISCONTINUED | OUTPATIENT
Start: 2024-07-28 | End: 2024-07-28

## 2024-07-28 RX ORDER — TERAZOSIN 2 MG/1
2 CAPSULE ORAL NIGHTLY
Status: DISCONTINUED | OUTPATIENT
Start: 2024-07-28 | End: 2024-07-29 | Stop reason: HOSPADM

## 2024-07-28 RX ORDER — ESCITALOPRAM OXALATE 10 MG/1
10 TABLET ORAL DAILY
Status: DISCONTINUED | OUTPATIENT
Start: 2024-07-28 | End: 2024-07-29 | Stop reason: HOSPADM

## 2024-07-28 RX ORDER — LEVOTHYROXINE SODIUM 0.03 MG/1
25 TABLET ORAL
Status: DISCONTINUED | OUTPATIENT
Start: 2024-07-29 | End: 2024-07-29 | Stop reason: HOSPADM

## 2024-07-28 RX ADMIN — ATORVASTATIN CALCIUM 80 MG: 80 TABLET, FILM COATED ORAL at 00:04

## 2024-07-28 RX ADMIN — MEGESTROL ACETATE 20 MG: 20 TABLET ORAL at 16:24

## 2024-07-28 RX ADMIN — PANTOPRAZOLE SODIUM 40 MG: 40 TABLET, DELAYED RELEASE ORAL at 06:25

## 2024-07-28 RX ADMIN — AMIODARONE HYDROCHLORIDE 200 MG: 200 TABLET ORAL at 08:25

## 2024-07-28 RX ADMIN — TERAZOSIN 2 MG: 2 CAPSULE ORAL at 21:12

## 2024-07-28 RX ADMIN — ATORVASTATIN CALCIUM 80 MG: 80 TABLET, FILM COATED ORAL at 21:12

## 2024-07-28 NOTE — THERAPY EVALUATION
Patient Name: Magan Lubin  : 1935    MRN: [id number]                              Today's Date: 2024       Admit Date: 2024    Visit Dx:     ICD-10-CM ICD-9-CM   1. Closed head injury, initial encounter  S09.90XA 959.01   2. Laceration of scalp, initial encounter  S01.01XA 873.0   3. Altered mental status, unspecified altered mental status type  R41.82 780.97     Patient Active Problem List   Diagnosis    Bilateral carotid artery stenosis    History of prostate cancer    Benign prostatic hyperplasia without lower urinary tract symptoms    Left upper extremity numbness    Symptomatic carotid artery stenosis    Ischemic stroke    Left upper lobe pulmonary nodule    Internal carotid artery stent present    Former smoker    Gastroesophageal reflux disease without esophagitis    History of stroke    Generalized weakness    Localized edema    Acquired hypothyroidism    Confusion    Memory loss    Medicare annual wellness visit, subsequent    Impacted cerumen of right ear    Paroxysmal atrial fibrillation    Ileus    History of stroke within last year    Scalp lesion    Closed head injury    Altered mental status     Past Medical History:   Diagnosis Date    Hyperlipidemia     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     2023    Personal history of transient ischemic attack (TIA), and cerebral infarction without residual deficits     2023    Prostate cancer     Squamous cell carcinoma of head and neck      Past Surgical History:   Procedure Laterality Date    APPENDECTOMY      HERNIA REPAIR      KNEE ARTHROPLASTY, PARTIAL REPLACEMENT Bilateral     OTHER SURGICAL HISTORY  2023    Carotid artery on 2023 TCAR      General Information       Row Name 24 1050          OT Time and Intention    Document Type evaluation  -JW     Mode of Treatment occupational therapy  -JW       Row Name 24 1050          General Information    Patient Profile Reviewed yes  -JW      "Prior Level of Function independent:;ADL's;min assist:;all household mobility  reports being \"mostly\" independent with ADLs, but wife assists occasionally. He uses RW for mobility, denies any other falls  -     Existing Precautions/Restrictions fall  -     Barriers to Rehab none identified  -[facility] Name 07/28/24 1050          Living Environment    People in Home spouse  -[facility] Name 07/28/24 1050          Cognition    Orientation Status (Cognition) oriented x 3  -[facility] Name 07/28/24 1050          Safety Issues, Functional Mobility    Safety Issues Affecting Function (Mobility) judgment;problem-solving  -     Impairments Affecting Function (Mobility) balance;endurance/activity tolerance;strength  -               User Key  (r) = Recorded By, (t) = Taken By, (c) = Cosigned By      Initials Name Provider Type     Sandra Last OT Occupational Therapist                     Mobility/ADL's       Row Name 07/28/24 1051          Bed Mobility    Bed Mobility supine-sit;sit-supine  -     Supine-Sit [place] (Bed Mobility) standby assist;verbal cues  -     Sit-Supine [place] (Bed Mobility) standby assist;verbal cues  -     Assistive Device (Bed Mobility) bed rails;head of bed elevated  -[facility] Name 07/28/24 1051          Transfers    Transfers sit-stand transfer;toilet transfer  -[facility] Name 07/28/24 1051          Sit-Stand Transfer    Sit-Stand [place] (Transfers) contact guard  [facility]     Assistive Device (Sit-Stand Transfers) walker, front-wheeled  -     Comment, (Sit-Stand Transfer) posterior leaning initially in standing  -JW       Row Name 07/28/24 1051          Toilet Transfer    Type (Toilet Transfer) sit-stand;stand-sit  [facility]     [place] Level (Toilet Transfer) contact guard  [facility]     Assistive Device (Toilet Transfer) commode;grab bars/safety frame;walker, front-wheeled  -[facility] Name 07/28/24 1051          Functional Mobility    Functional Mobility- " "Comment fxl ambulation to/from bathroom wiuth CGA using RW. Somewhat unsteady but no overt LOB.  -[facility] Name 07/28/24 1051          Activities of Daily Living    BADL Assessment/Intervention toileting;lower body dressing;grooming  -JW       Row Name 07/28/24 1051          Toileting Assessment/Training    [place] Level (Toileting) standby assist;verbal cues  -     Assistive Devices (Toileting) commode;grab bar/safety frame  -     Comment, (Toileting) cues for sequencing. Reports feeling \"thrown off\" not being in his home environment. CGA for balance with clothing mgt  -[facility] Name 07/28/24 1051          Lower Body Dressing Assessment/Training    [place] Level (Lower Body Dressing) doff;don;socks;supervision  -     Position (Lower Body Dressing) edge of bed sitting  -     Comment, (Lower Body Dressing) increased time to complete  -JW       Row Name 07/28/24 1051          Grooming Assessment/Training    [place] Level (Grooming) wash face, hands;set up;supervision  -     Position (Grooming) sink side;supported standing  -               User Key  (r) = Recorded By, (t) = Taken By, (c) = Cosigned By      Initials Name Provider Type     Sandra Last OT Occupational Therapist                   Obj/Interventions       [facility] Name 07/28/24 1053          Sensory Assessment (Somatosensory)    Sensory Assessment (Somatosensory) sensation intact  -JW       Row Name 07/28/24 1053          Vision Assessment/Intervention    Vision Assessment Comment denies acute changes in vision  -JW       Row Name 07/28/24 1053          Range of Motion Comprehensive    General Range of Motion bilateral upper extremity ROM WNL  -JW       Row Name 07/28/24 1053          Strength Comprehensive (MMT)    Comment, General Manual Muscle Testing (MMT) Assessment generalized weakness. UEs 4/5 grossly  -JW       Row Name 07/28/24 1053          Balance    Balance Assessment sitting static balance;sitting dynamic " balance;standing static balance;standing dynamic balance  -     Static Sitting Balance supervision  -     Dynamic Sitting Balance supervision  -     Position, Sitting Balance sitting edge of bed  -     Static Standing Balance standby assist;contact guard  -     Dynamic Standing Balance contact guard;minimal assist  -     Position/Device Used, Standing Balance supported;walker, front-wheeled  -     Balance Interventions sitting;standing;occupation based/functional task  -     Comment, Balance posterior leaning initially but able to correct. Unsteady but no overt LOB. some cueing provided for safety  -               User Key  (r) = Recorded By, (t) = Taken By, (c) = Cosigned By      Initials Name Provider Type     Sandra Last, OT Occupational Therapist                   Goals/Plan       Row Name 07/28/24 1057          Transfer Goal 1 (OT)    Activity/Assistive Device (Transfer Goal 1, OT) transfers, all  -     [place] Level/Cues Needed (Transfer Goal 1, OT) supervision required  -     Time Frame (Transfer Goal 1, OT) short term goal (STG);2 weeks  -     Progress/Outcome (Transfer Goal 1, OT) goal ongoing  -       Row Name 07/28/24 1057          Toileting Goal 1 (OT)    Activity/Device (Toileting Goal 1, OT) toileting skills, all  -     [place] Level/Cues Needed (Toileting Goal 1, OT) set-up required  -     Time Frame (Toileting Goal 1, OT) short term goal (STG);2 weeks  -     Progress/Outcome (Toileting Goal 1, OT) goal ongoing  -       Row Name 07/28/24 1057          Grooming Goal 1 (OT)    Activity/Device (Grooming Goal 1, OT) grooming skills, all  -     [place] (Grooming Goal 1, OT) modified independence  -     Time Frame (Grooming Goal 1, OT) short term goal (STG);2 weeks  -     Progress/Outcome (Grooming Goal 1, OT) goal ongoing  -       Row Name 07/28/24 1057          Therapy Assessment/Plan (OT)    Planned Therapy Interventions (OT) activity tolerance  "training;BADL retraining;functional balance retraining;occupation/activity based interventions;patient/caregiver education/training;transfer/mobility retraining;strengthening exercise  -               User Key  (r) = Recorded By, (t) = Taken By, (c) = Cosigned By      Initials Name Provider Type    Sandra Landon OT Occupational Therapist                   Clinical Impression       Row Name 07/28/24 1054          Pain Assessment    Pretreatment Pain Rating 2/10  -     Posttreatment Pain Rating 2/10  -     Pain Location - head  -       Row Name 07/28/24 1054          Plan of Care Review    Plan of Care Reviewed With patient  -     Outcome Evaluation Pt is an 89 y/o M admitted after fall +hitting head resulting in closed head injury. Hx of dementia. Pt lives with his wife and reports being \"mostly\" independent with ADLs, use of RW for mobility. Today he performs fxl ambulation to/from bathroom using RW with CGA, somewhat unsteady but no overt LOB. Some cueing for safety. He completes toileting and grooming with sba, CGA for balance, LB dressing with spv. OT will cont to follow, recommending d/c home with HH vs SNF pending progress and assist available at home.  -       Row Name 07/28/24 1054          Therapy Assessment/Plan (OT)    Rehab Potential (OT) good, to achieve stated therapy goals  -     Criteria for Skilled Therapeutic Interventions Met (OT) yes;skilled treatment is necessary  -     Therapy Frequency (OT) 5 times/wk  -       Row Name 07/28/24 1054          Therapy Plan Review/Discharge Plan (OT)    Anticipated Discharge Disposition (OT) home with assist;home with home health;skilled nursing facility  -       Row Name 07/28/24 1054          Positioning and Restraints    Pre-Treatment Position in bed  -     Post Treatment Position bed  -JW     In Bed notified nsg;fowlers;call light within reach;encouraged to call for assist;exit alarm on  -               User Key  (r) = Recorded By, " (t) = Taken By, (c) = Cosigned By      Initials Name Provider Type    Sandra Landon OT Occupational Therapist                   Outcome Measures       Row Name 07/28/24 1058          How much help from another is currently needed...    Putting on and taking off regular lower body clothing? 3  -JW     Bathing (including washing, rinsing, and drying) 3  -JW     Toileting (which includes using toilet bed pan or urinal) 3  -JW     Putting on and taking off regular upper body clothing 3  -JW     Taking care of personal grooming (such as brushing teeth) 3  -JW     Eating meals 4  -JW     AM-PAC 6 Clicks Score (OT) 19  -       Row Name 07/28/24 0825          How much help from another person do you currently need...    Turning from your back to your side while in flat bed without using bedrails? 3  -EB     Moving from lying on back to sitting on the side of a flat bed without bedrails? 3  -EB     Moving to and from a bed to a chair (including a wheelchair)? 3  -EB     Standing up from a chair using your arms (e.g., wheelchair, bedside chair)? 3  -EB     Climbing 3-5 steps with a railing? 1  -EB     To walk in hospital room? 2  -EB     AM-PAC 6 Clicks Score (PT) 15  -EB     Highest Level of Mobility Goal 4 --> Transfer to chair/commode  -       Row Name 07/28/24 1058          Modified Gallia Scale    Modified Gallia Scale 4 - Moderately severe disability.  Unable to walk without assistance, and unable to attend to own bodily needs without assistance.  -       Row Name 07/28/24 1058          Functional Assessment    Outcome Measure Options AM-PAC 6 Clicks Daily Activity (OT);Modified [place]  -               User Key  (r) = Recorded By, (t) = Taken By, (c) = Cosigned By      Initials Name Provider Type    Clarita Liu, RN Registered Nurse    Sandra Landon OT Occupational Therapist                    Occupational Therapy Education       Title: PT OT SLP Therapies (In Progress)       Topic: Occupational Therapy  "(In Progress)       Point: ADL training (Done)       Description:   Instruct learner(s) on proper safety adaptation and remediation techniques during self care or transfers.   Instruct in proper use of assistive devices.                  Learning Progress Summary             Patient Acceptance, E, VU by  at 7/28/2024 1058                         Point: Home exercise program (Not Started)       Description:   Instruct learner(s) on appropriate technique for monitoring, assisting and/or progressing therapeutic exercises/activities.                  Learner Progress:  Not documented in this visit.              Point: Precautions (Done)       Description:   Instruct learner(s) on prescribed precautions during self-care and functional transfers.                  Learning Progress Summary             Patient Acceptance, E, VU by  at 7/28/2024 1058                         Point: Body mechanics (Done)       Description:   Instruct learner(s) on proper positioning and spine alignment during self-care, functional mobility activities and/or exercises.                  Learning Progress Summary             Patient Acceptance, E, VU by  at 7/28/2024 1058                                         User Key       Initials Effective Dates Name Provider Type Discipline     06/10/21 -  Sandra Last OT Occupational Therapist OT                  OT Recommendation and Plan  Planned Therapy Interventions (OT): activity tolerance training, BADL retraining, functional balance retraining, occupation/activity based interventions, patient/caregiver education/training, transfer/mobility retraining, strengthening exercise  Therapy Frequency (OT): 5 times/wk  Plan of Care Review  Plan of Care Reviewed With: patient  Outcome Evaluation: Pt is an 88 y/o M admitted after fall +hitting head resulting in closed head injury. Hx of dementia. Pt lives with his wife and reports being \"mostly\" independent with ADLs, use of RW for mobility. Today he " performs fxl ambulation to/from bathroom using RW with CGA, somewhat unsteady but no overt LOB. Some cueing for safety. He completes toileting and grooming with sba, CGA for balance, LB dressing with spv. OT will cont to follow, recommending d/c home with HH vs SNF pending progress and assist available at home.     Time Calculation:   Evaluation Complexity (OT)  Review Occupational Profile/Medical/Therapy History Complexity: expanded/moderate complexity  Assessment, Occupational Performance/Identification of Deficit Complexity: 3-5 performance deficits  Clinical Decision Making Complexity (OT): detailed assessment/moderate complexity  Overall Complexity of Evaluation (OT): moderate complexity     Time Calculation- OT       Row Name 07/28/24 1058             Time Calculation- OT    OT Start Time 0945  -      OT Stop Time 1011  -      OT Time Calculation (min) 26 min  -      Total Timed Code Minutes- OT 16 minute(s)  -JW      OT Received On 07/28/24  -      OT - Next Appointment 07/29/24  -      OT Goal Re-Cert Due Date 08/09/24  -         Timed Charges    06921 - OT Self Care/Mgmt Minutes 16  -         Untimed Charges    OT Eval/Re-eval Minutes 10  -JW         Total Minutes    Timed Charges Total Minutes 16  -      Untimed Charges Total Minutes 10  -JW       Total Minutes 26  -JW                User Key  (r) = Recorded By, (t) = Taken By, (c) = Cosigned By      Initials Name Provider Type    Sandra Landon OT Occupational Therapist                  Therapy Charges for Today       Code Description Service Date Service Provider Modifiers Qty    [id number]  OT SELF CARE/MGMT/TRAIN EA 15 MIN 7/28/2024 Sandra Last OT GO 1    [id number]  OT EVAL MOD COMPLEXITY 3 7/28/2024 Sandra Last OT GO 1                 Sandra Last OT  7/28/2024

## 2024-07-28 NOTE — PROGRESS NOTES
Name: Magan Lubin ADMIT: 2024   : 1935  PCP: Jo Ann Friend APRN    MRN: [id number] LOS: 0 days   AGE/SEX: 89 y.o. male  ROOM: [facility]     Subjective   Subjective   Patient reports no headache.  No dizziness.  No unilateral numbness or weakness.  No double vision.  No loss of the vision.  No slurring of the speech.  No seizures.  No loss of consciousness.    Review of Systems  Cardiovascular/respiratory.  No chest pain/no palpitations/no shortness of breath  GI.  No abdominal pain or nausea or vomiting.  Normal bowel habits today without constipation/diarrhea/bleeding per rectum/melena  .  No dysuria or hematuria.  Constitutional.  No fever or chills.  Musculoskeletal.  Reports no back pain or joint pain.     Objective   Objective   Vital Signs  Temp:  [98 °F (36.7 °C)-98.8 °F (37.1 °C)] 98.1 °F (36.7 °C)  Heart Rate:  [55-79] 56  Resp:  [18] 18  BP: ()/(51-62) 93/54  SpO2:  [96 %-99 %] 97 %  on   ;   Device (Oxygen Therapy): room air    Intake/Output Summary (Last 24 hours) at 2024 1353  Last data filed at 2024 0916  Gross per 24 hour   Intake 500 ml   Output 850 ml   Net -350 ml     Body mass index is 22.27 kg/m².      24  1403 24  0521   Weight: 73.1 kg (161 lb 2.5 oz) 74.5 kg (164 lb 3.9 oz)     Physical Exam  General.  Elderly gentleman.  He is alert.  Oriented times 2 out of 4.  In no apparent pain/distress/diaphoresis.  Normal mood and affect.  HEENT.  Stapled laceration on the left parietal region with mild hematoma.  Pupils equal round and reactive.  Intact extraocular musculature.  No pallor or jaundice.  Moist mucous membrane.  Neck.  No C-spine tenderness.  Neck is supple.  No JVD.  No lymphadenopathy or thyromegaly.  Cardiovascular.  Regular rate and rhythm and grade 2 systolic murmur.  Chest.  Clear to auscultation bilaterally with no added sounds.  Abdomen.  Soft lax.  No tenderness.  No organomegaly.  No guarding or rebound.  Extremities.  No  "clubbing/cyanosis.  +1 bilateral lower extremity edema.  Musculoskeletal.  No spine tenderness.  Scars on the anterior aspect of both knees.  No localized joint tenderness with normal range of movement of all joints.  CNS.  No acute focal neurological deficits    Results Review:      Results from last 7 days   Lab Units 07/28/24  0447 07/27/24  1300   SODIUM mmol/L 140 139   POTASSIUM mmol/L 3.7 4.2   CHLORIDE mmol/L 111* 106   CO2 mmol/L 22.0 20.3*   BUN mg/dL 19 18   CREATININE mg/dL 0.67* 0.87   GLUCOSE mg/dL 108* 113*   CALCIUM mg/dL 8.6 9.1   AST (SGOT) U/L  --  18   ALT (SGPT) U/L  --  18     Estimated Creatinine Clearance: 81.9 mL/min (A) (by C-G formula based on SCr of 0.67 mg/dL (L)).          Results from last 7 days   Lab Units 07/27/24  1635 07/27/24  1300   CK TOTAL U/L 83  --    HSTROP T ng/L 25* 35*                       Invalid input(s): \"LDLCALC\"  Results from last 7 days   Lab Units 07/28/24  0447 07/27/24  1300   WBC 10*3/mm3 7.02 6.84   HEMOGLOBIN g/dL 8.1* 10.1*   HEMATOCRIT % 24.9* 30.4*   PLATELETS 10*3/mm3 220 229   MCV fL 105.1* 105.9*   MCH pg 34.2* 35.2*   MCHC g/dL 32.5 33.2   RDW % 13.2 13.1   RDW-SD fl 50.9 51.2   MPV fL 9.2 9.1   NEUTROS ABS 10*3/mm3  --  5.95   EOS ABS 10*3/mm3  --  0.00   BASOS ABS 10*3/mm3  --  0.00   NRBC /100 WBC  --  0.0             Results from last 7 days   Lab Units 07/27/24  1635   PROCALCITONIN ng/mL 0.07                 Results from last 7 days   Lab Units 07/28/24  0003   ADENOVIRUS DETECTION BY PCR  Not Detected   CORONAVIRUS 229E  Not Detected   CORONAVIRUS HKU1  Not Detected   CORONAVIRUS NL63  Not Detected   CORONAVIRUS OC43  Not Detected   HUMAN METAPNEUMOVIRUS  Not Detected   HUMAN RHINOVIRUS/ENTEROVIRUS  Not Detected   INFLUENZA B PCR  Not Detected   PARAINFLUENZA 1  Not Detected   PARAINFLUENZA VIRUS 2  Not Detected   PARAINFLUENZA VIRUS 3  Not Detected   PARAINFLUENZA VIRUS 4  Not Detected   BORDETELLA PERTUSSIS PCR  Not Detected   CHLAMYDOPHILA " PNEUMONIAE PCR  Not Detected   MYCOPLAMA PNEUMO PCR  Not Detected   INFLUENZA A PCR  Not Detected   RSV, PCR  Not Detected     Results from last 7 days   Lab Units 07/27/24  2120   NITRITE UA  Negative   WBC UA /HPF 0-2   BACTERIA UA /HPF None Seen   SQUAM EPITHEL UA /HPF 0-2           Imaging:  Imaging Results (Last 24 Hours)       Procedure Component Value Units Date/Time    CT Cervical Spine Without Contrast [[id number]] Collected: 07/27/24 1520     Updated: 07/27/24 1523    Narrative:      The dictation for this CT examination of the cervical spine was included  in the report for the CT examination of the head.           Radiation dose reduction techniques were utilized, including automated  exposure control and exposure modulation based on body size.        This report was finalized on 7/27/2024 3:20 PM by Dr. Aleksandr Lucas M.D  on Workstation: [id number]       CT Head Without Contrast [[id number]] Collected: 07/27/24 1444     Updated: 07/27/24 1507    Narrative:      CT HEAD WO CONTRAST-, CT CERVICAL SPINE WITHOUT CONTRAST     HISTORY:  fall/syncope, head injury on anticoagulation, neck pain     COMPARISON: MRI brain and cervical spine 7/25/2023 and CT head  7/25/2023.     CT HEAD WITHOUT CONTRAST     There is age-appropriate atrophy. A remote infarct is appreciated  involving the periventricular white matter left frontal lobe and lateral  aspect of the head of the caudate nucleus on the left, present  previously. There is no evidence of fracture, intracranial hemorrhage or  of acute infarction/contusion. Moderate to severe vascular calcification  and mild to moderate small vessel ischemic disease is noted.     CT CERVICAL SPINE WITHOUT CONTRAST:     There is partial fusion of the C3 and C4 vertebral bodies. There is  solid osseous fusion of the facets to the right at C4-5 and to the left  at C3-4. There is no evidence of fracture.     C2-3: Moderate to severe facet degenerative disease is present on  the  left.     C3-4: Mild central disc osteophyte complex is present with evidence of  herniation.     C4-5: There is no evidence of disc bulge or herniation.     C5-6: A far lateral disc osteophyte complex is present to the left.     C6-7: There is no evidence of disc bulge or herniation.     C7-T1: There is no evidence of a disc bulge or herniation.     This report was finalized on 7/27/2024 3:04 PM by Dr. Aleksandr Lucas M.D  on Workstation: [id number]                  I reviewed the patient's new clinical results / labs / tests / procedures      Assessment/Plan     Active Hospital Problems    Diagnosis  POA    **Closed head injury [S09.90XA]  Yes    Dementia without behavioral disturbance [F03.90]  Yes    Fall [W19.XXXA]  Yes    Hyperlipidemia [E78.5]  Yes    Hypertension [I10]  Yes    History of prostate cancer [Z85.46]  Not Applicable    Altered mental status [R41.82]  Yes    History of TIA (transient ischemic attack) [Z86.73]  Not Applicable    Atrial fibrillation [I48.91]  Yes    Hypothyroidism [E03.9]  Yes    History of CVA (cerebrovascular accident) [Z86.73]  Not Applicable    GERD (gastroesophageal reflux disease) [K21.9]  Yes    Carotid stenosis [I65.29]  Yes      Resolved Hospital Problems   No resolved problems to display.           Fall leading to head trauma in a patient with a history of dementia/TIA/CVA.  Neurological and musculoskeletal examination without acute abnormalities.  CT scan of the brain revealed old CVA/atrophy/small vessel disease.  CT scan of the neck with degenerative and osteoarthritic changes but no fractures.  Aspirin/Plavix/Eliquis on hold.  I will repeat CAT scan of the brain to assure no new intracranial bleeds.  There is no evidence of infection with negative respiratory PCR panel, UA and troponin.  Chest x-ray with atelectasis versus infiltrate but I believe this is mostly atelectasis.  CK is normal.  Will check TSH/B12/folate.  Will ask PT and OT to evaluate.  I believe  that his mentation is around the baseline at this time.  Will try to confirm this with the family.  Hyperlipidemia/carotid peripheral vascular disease.  Continue Lipitor.  Will resume Plavix and aspirin once repeat CAT scan is negative.  Hyperglycemia.  Check A1c.  Anemia.  Hemoglobin worse than baseline.  Baseline hemoglobin between 11 and 13.  Will workup the anemia.  History of prostate cancer and benign prostatic hypertrophy.  Continue Hytrin.  Hypothyroidism.  Continue current replacement check TSH.  GERD.  Benign GI examination.  Continue Protonix.  Hypertension/atrial fibrillation.  Patient in normal sinus rhythm.  Blood pressure is on the soft side.  Will continue amiodarone/Lipitor.  Continue holding Eliquis/aspirin/Plavix until we assure that there is no acute intracranial bleed.  EKG with normal sinus rhythm.  Echo on 7/20/2023 revealed a normal ejection fraction with normal diastolic function and mild pulmonary hypertension and trace MR.  There is no evidence of angina or congestive heart failure.  Will give maintenance IV fluid  VTE prophylaxis.  Sequential compression device.      Discussed my findings and plan of treatment with the patient/nurse.  Disposition.  Will depend on physical therapy evaluation home with home health versus SNF.  Hopefully can make a decision tomorrow.    Alba Calderon MD  [facility]  07/28/24  13:53 EDT

## 2024-07-28 NOTE — THERAPY EVALUATION
Patient Name: Magan Lubin  : 1935    MRN: [id number]                              Today's Date: 2024       Admit Date: 2024    Visit Dx:     ICD-10-CM ICD-9-CM   1. Closed head injury, initial encounter  S09.90XA 959.01   2. Laceration of scalp, initial encounter  S01.01XA 873.0   3. Altered mental status, unspecified altered mental status type  R41.82 780.97     Patient Active Problem List   Diagnosis    Bilateral carotid artery stenosis    History of prostate cancer    Benign prostatic hyperplasia without lower urinary tract symptoms    Left upper extremity numbness    Carotid stenosis    Ischemic stroke    Left upper lobe pulmonary nodule    Internal carotid artery stent present    Former smoker    GERD (gastroesophageal reflux disease)    History of CVA (cerebrovascular accident)    Generalized weakness    Localized edema    Hypothyroidism    Confusion    Memory loss    Medicare annual wellness visit, subsequent    Impacted cerumen of right ear    Atrial fibrillation    Ileus    History of TIA (transient ischemic attack)    Scalp lesion    Closed head injury    Altered mental status    Dementia without behavioral disturbance    Fall    Hyperlipidemia    Hypertension    History of prostate cancer     Past Medical History:   Diagnosis Date    Hyperlipidemia     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     2023    Personal history of transient ischemic attack (TIA), and cerebral infarction without residual deficits     2023    Prostate cancer     Squamous cell carcinoma of head and neck      Past Surgical History:   Procedure Laterality Date    APPENDECTOMY      HERNIA REPAIR      KNEE ARTHROPLASTY, PARTIAL REPLACEMENT Bilateral     OTHER SURGICAL HISTORY  2023    Carotid artery on 2023 TCAR      General Information       Row Name 24 1323          Physical Therapy Time and Intention    Document Type evaluation  -CN     Mode of Treatment physical  therapy  -CN       Row Name 07/28/24 1323          General Information    Patient Profile Reviewed yes  -CN     Prior Level of Function independent:;min assist:;all household mobility  -CN     Existing Precautions/Restrictions fall  -CN       Row Name 07/28/24 1323          Living Environment    People in Home spouse  -CN       Row Name 07/28/24 1323          Home Main Entrance    Number of Stairs, Main Entrance two  -CN     Stair Railings, Main Entrance railings on both sides of stairs  -CN       Row Name 07/28/24 1323          Stairs Within Home, Primary    Number of Stairs, Within Home, Primary none  -CN       Row Name 07/28/24 1323          Cognition    Orientation Status (Cognition) oriented x 3  -CN       Row Name 07/28/24 1323          Safety Issues, Functional Mobility    Impairments Affecting Function (Mobility) balance;endurance/activity tolerance;strength  -CN               User Key  (r) = Recorded By, (t) = Taken By, (c) = Cosigned By      Initials Name Provider Type    Cielo Randall, PT Physical Therapist                   Mobility       Row Name 07/28/24 1324          Bed Mobility    Bed Mobility supine-sit;sit-supine  -CN     Supine-Sit [place] (Bed Mobility) contact guard  -CN     Sit-Supine [place] (Bed Mobility) minimum assist (75% patient effort)  -CN     Assistive Device (Bed Mobility) bed rails;head of bed elevated  -CN       Row Name 07/28/24 1324          Sit-Stand Transfer    Sit-Stand [place] (Transfers) minimum assist (75% patient effort);verbal cues  -CN     Assistive Device (Sit-Stand Transfers) walker, front-wheeled  -CN       Row Name 07/28/24 1324          Gait/Stairs (Locomotion)    [place] Level (Gait) contact guard;verbal cues  -CN     Assistive Device (Gait) walker, front-wheeled  -CN     Distance in Feet (Gait) 20  -CN     Deviations/Abnormal Patterns (Gait) gait speed decreased;festinating/shuffling  -CN     Bilateral Gait Deviations forward  flexed posture;heel strike decreased  -CN     Comment, (Gait/Stairs) Pt with forward flexed posture and required cues for safety with Rwx  -CN               User Key  (r) = Recorded By, (t) = Taken By, (c) = Cosigned By      Initials Name Provider Type    Cielo Randall, PT Physical Therapist                   Obj/Interventions       Row Name 07/28/24 1436          Range of Motion Comprehensive    General Range of Motion bilateral upper extremity ROM WNL  -CN       Row Name 07/28/24 1436          Strength Comprehensive (MMT)    General Manual Muscle Testing (MMT) Assessment lower extremity strength deficits identified  -CN     Comment, General Manual Muscle Testing (MMT) Assessment Functional LE strength rated 4/5  -CN               User Key  (r) = Recorded By, (t) = Taken By, (c) = Cosigned By      Initials Name Provider Type    Cielo Randall, PT Physical Therapist                   Goals/Plan       Row Name 07/28/24 1440          Bed Mobility Goal 1 (PT)    Activity/Assistive Device (Bed Mobility Goal 1, PT) bed mobility activities, all  -CN     [place] Level/Cues Needed (Bed Mobility Goal 1, PT) modified independence  -CN     Time Frame (Bed Mobility Goal 1, PT) 1 week  -CN       Row Name 07/28/24 1440          Transfer Goal 1 (PT)    Activity/Assistive Device (Transfer Goal 1, PT) transfers, all  -CN     [place] Level/Cues Needed (Transfer Goal 1, PT) modified independence  -CN     Time Frame (Transfer Goal 1, PT) 1 week  -CN       Row Name 07/28/24 1440          Gait Training Goal 1 (PT)    Activity/Assistive Device (Gait Training Goal 1, PT) gait (walking locomotion);assistive device use  -CN     [place] Level (Gait Training Goal 1, PT) modified independence  -CN     Distance (Gait Training Goal 1, PT) 100  -CN     Time Frame (Gait Training Goal 1, PT) 1 week  -CN       Row Name 07/28/24 1440          Therapy Assessment/Plan (PT)    Planned Therapy Interventions (PT)  balance training;bed mobility training;gait training;home exercise program;neuromuscular re-education;transfer training;stair training;strengthening;patient/family education  -CN               User Key  (r) = Recorded By, (t) = Taken By, (c) = Cosigned By      Initials Name Provider Type    Cielo Randall, PT Physical Therapist                   Clinical Impression       Row Name 07/28/24 1437          Pain    Pretreatment Pain Rating 0/10 - no pain  -CN     Posttreatment Pain Rating 0/10 - no pain  -CN       Row Name 07/28/24 1437          Plan of Care Review    Plan of Care Reviewed With patient  -CN     Outcome Evaluation Pt admitted to [facility] s/p fall and laceration to head. Pt with history of dementia and states he lives with his wife with family support nearby, however per nsg unsure if history is accurate. Pt states he is I with ADLs and has a Rwx at home although does not typically use. Pt required Gabriella/CGA with mobility this date and as well as cues for safety with ambulation due to Rwx navigation. Pt would benefit from D/C with HHPT vs SNF pending family assist and progress during admission.  -CN       Row Name 07/28/24 1437          Therapy Assessment/Plan (PT)    Rehab Potential (PT) good, to achieve stated therapy goals  -CN     Criteria for Skilled Interventions Met (PT) yes;meets criteria;skilled treatment is necessary  -CN     Therapy Frequency (PT) 5 times/wk  -CN       Row Name 07/28/24 1437          Positioning and Restraints    Pre-Treatment Position in bed  -CN     Post Treatment Position bed  -CN     In Bed fowlers;call light within reach;encouraged to call for assist;exit alarm on;legs elevated  -CN               User Key  (r) = Recorded By, (t) = Taken By, (c) = Cosigned By      Initials Name Provider Type    Cielo Randall, PT Physical Therapist                   Outcome Measures       Row Name 07/28/24 1441 07/28/24 0840       How much help from another person do you  currently need...    Turning from your back to your side while in flat bed without using bedrails? 3  -CN 3  -EB    Moving from lying on back to sitting on the side of a flat bed without bedrails? 3  -CN 3  -EB    Moving to and from a bed to a chair (including a wheelchair)? 3  -CN 3  -EB    Standing up from a chair using your arms (e.g., wheelchair, bedside chair)? 3  -CN 3  -EB    Climbing 3-5 steps with a railing? 2  -CN 1  -EB    To walk in hospital room? 3  -CN 2  -EB    AM-PAC 6 Clicks Score (PT) 17  -CN 15  -EB    Highest Level of Mobility Goal 5 --> Static standing  -CN 4 --> Transfer to chair/commode  -EB      Row Name 07/28/24 1058          Modified [place] Scale    Modified [place] Scale 4 - Moderately severe disability.  Unable to walk without assistance, and unable to attend to own bodily needs without assistance.  -JW       Row Name 07/28/24 1441 07/28/24 1058       Functional Assessment    Outcome Measure Options AM-PAC 6 Clicks Basic Mobility (PT)  -CN AM-PAC 6 Clicks Daily Activity (OT);Modified [place]  -JW              User Key  (r) = Recorded By, (t) = Taken By, (c) = Cosigned By      Initials Name Provider Type    Cielo Randall, PT Physical Therapist    Clarita Lui, RN Registered Nurse    Sandra Landon OT Occupational Therapist                                 Physical Therapy Education       Title: PT OT SLP Therapies (In Progress)       Topic: Physical Therapy (Done)       Point: Mobility training (Done)       Learning Progress Summary             Patient Acceptance, E, VU,NR by CN at 7/28/2024 1441                         Point: Home exercise program (Done)       Learning Progress Summary             Patient Acceptance, E, VU,NR by CN at 7/28/2024 1441                         Point: Body mechanics (Done)       Learning Progress Summary             Patient Acceptance, E, VU,NR by CN at 7/28/2024 1441                         Point: Precautions (Done)       Learning Progress  Summary             Patient Acceptance, E, VU,NR by CN at 7/28/2024 1441                                         User Key       Initials Effective Dates Name Provider Type Discipline     06/16/21 -  Cielo Lee PT Physical Therapist PT                  PT Recommendation and Plan  Planned Therapy Interventions (PT): balance training, bed mobility training, gait training, home exercise program, neuromuscular re-education, transfer training, stair training, strengthening, patient/family education  Plan of Care Reviewed With: patient  Outcome Evaluation: Pt admitted to [facility] s/p fall and laceration to head. Pt with history of dementia and states he lives with his wife with family support nearby, however per nsg unsure if history is accurate. Pt states he is I with ADLs and has a Rwx at home although does not typically use. Pt required Gabriella/CGA with mobility this date and as well as cues for safety with ambulation due to Rwx navigation. Pt would benefit from D/C with HHPT vs SNF pending family assist and progress during admission.     Time Calculation:         PT Charges       Row Name 07/28/24 1441             Time Calculation    Start Time 1017  -CN      Stop Time 1031  -CN      Time Calculation (min) 14 min  -CN      PT Received On 07/28/24  -CN      PT - Next Appointment 07/29/24  -CN         Timed Charges    98470 - PT Therapeutic Activity Minutes 10  -CN         Total Minutes    Timed Charges Total Minutes 10  -CN       Total Minutes 10  -CN                User Key  (r) = Recorded By, (t) = Taken By, (c) = Cosigned By      Initials Name Provider Type    CN Cielo Lee, SLAVA Physical Therapist                  Therapy Charges for Today       Code Description Service Date Service Provider Modifiers Qty    [id number]  PT THERAPEUTIC ACT EA 15 MIN 7/28/2024 Cielo Lee, PT GP 1    [id number]  PT EVAL MOD COMPLEXITY 1 7/28/2024 Cielo Lee, PT GP 1            PT  G-Codes  Outcome Measure Options: AM-PAC 6 Clicks Basic Mobility (PT)  AM-PAC 6 Clicks Score (PT): 17  AM-PAC 6 Clicks Score (OT): 19  Modified [place] Scale: 4 - Moderately severe disability.  Unable to walk without assistance, and unable to attend to own bodily needs without assistance.  PT Discharge Summary  Anticipated Discharge Disposition (PT): home with home health, home with assist, skilled nursing facility    Cielo Lee, PT  7/28/2024

## 2024-07-29 ENCOUNTER — READMISSION MANAGEMENT (OUTPATIENT)
Dept: CALL CENTER | Facility: HOSPITAL | Age: 89
End: 2024-07-29
Payer: MEDICARE

## 2024-07-29 ENCOUNTER — APPOINTMENT (OUTPATIENT)
Dept: CT IMAGING | Facility: HOSPITAL | Age: 89
End: 2024-07-29
Payer: MEDICARE

## 2024-07-29 VITALS
RESPIRATION RATE: 20 BRPM | HEART RATE: 63 BPM | WEIGHT: 164.24 LBS | SYSTOLIC BLOOD PRESSURE: 132 MMHG | OXYGEN SATURATION: 100 % | TEMPERATURE: 98.2 F | BODY MASS INDEX: 22.25 KG/M2 | HEIGHT: 72 IN | DIASTOLIC BLOOD PRESSURE: 58 MMHG

## 2024-07-29 PROBLEM — E78.5 HYPERLIPIDEMIA: Status: RESOLVED | Noted: 2024-07-28 | Resolved: 2024-07-29

## 2024-07-29 LAB
ANION GAP SERPL CALCULATED.3IONS-SCNC: 10.9 MMOL/L (ref 5–15)
BASOPHILS # BLD AUTO: 0.02 10*3/MM3 (ref 0–0.2)
BASOPHILS NFR BLD AUTO: 0.4 % (ref 0–1.5)
BUN SERPL-MCNC: 15 MG/DL (ref 8–23)
BUN/CREAT SERPL: 19.2 (ref 7–25)
CALCIUM SPEC-SCNC: 8.6 MG/DL (ref 8.6–10.5)
CHLORIDE SERPL-SCNC: 107 MMOL/L (ref 98–107)
CO2 SERPL-SCNC: 21.1 MMOL/L (ref 22–29)
CREAT SERPL-MCNC: 0.78 MG/DL (ref 0.76–1.27)
DEPRECATED RDW RBC AUTO: 50.5 FL (ref 37–54)
EGFRCR SERPLBLD CKD-EPI 2021: 86.3 ML/MIN/1.73
EOSINOPHIL # BLD AUTO: 0.12 10*3/MM3 (ref 0–0.4)
EOSINOPHIL NFR BLD AUTO: 2.4 % (ref 0.3–6.2)
ERYTHROCYTE [DISTWIDTH] IN BLOOD BY AUTOMATED COUNT: 13.1 % (ref 12.3–15.4)
GLUCOSE SERPL-MCNC: 95 MG/DL (ref 65–99)
HCT VFR BLD AUTO: 22.8 % (ref 37.5–51)
HGB BLD-MCNC: 7.6 G/DL (ref 13–17.7)
IMM GRANULOCYTES # BLD AUTO: 0.02 10*3/MM3 (ref 0–0.05)
IMM GRANULOCYTES NFR BLD AUTO: 0.4 % (ref 0–0.5)
LYMPHOCYTES # BLD AUTO: 1.07 10*3/MM3 (ref 0.7–3.1)
LYMPHOCYTES NFR BLD AUTO: 21.8 % (ref 19.6–45.3)
MCH RBC QN AUTO: 34.9 PG (ref 26.6–33)
MCHC RBC AUTO-ENTMCNC: 33.3 G/DL (ref 31.5–35.7)
MCV RBC AUTO: 104.6 FL (ref 79–97)
MONOCYTES # BLD AUTO: 0.79 10*3/MM3 (ref 0.1–0.9)
MONOCYTES NFR BLD AUTO: 16.1 % (ref 5–12)
NEUTROPHILS NFR BLD AUTO: 2.89 10*3/MM3 (ref 1.7–7)
NEUTROPHILS NFR BLD AUTO: 58.9 % (ref 42.7–76)
NRBC BLD AUTO-RTO: 0 /100 WBC (ref 0–0.2)
PLATELET # BLD AUTO: 197 10*3/MM3 (ref 140–450)
PMV BLD AUTO: 9 FL (ref 6–12)
POTASSIUM SERPL-SCNC: 3.6 MMOL/L (ref 3.5–5.2)
RBC # BLD AUTO: 2.18 10*6/MM3 (ref 4.14–5.8)
SODIUM SERPL-SCNC: 139 MMOL/L (ref 136–145)
WBC NRBC COR # BLD AUTO: 4.91 10*3/MM3 (ref 3.4–10.8)

## 2024-07-29 PROCEDURE — 80048 BASIC METABOLIC PNL TOTAL CA: CPT | Performed by: INTERNAL MEDICINE

## 2024-07-29 PROCEDURE — G0378 HOSPITAL OBSERVATION PER HR: HCPCS

## 2024-07-29 PROCEDURE — 70450 CT HEAD/BRAIN W/O DYE: CPT

## 2024-07-29 PROCEDURE — 85025 COMPLETE CBC W/AUTO DIFF WBC: CPT | Performed by: INTERNAL MEDICINE

## 2024-07-29 PROCEDURE — 97530 THERAPEUTIC ACTIVITIES: CPT

## 2024-07-29 RX ORDER — ACETAMINOPHEN 325 MG/1
650 TABLET ORAL EVERY 4 HOURS PRN
Qty: 120 TABLET | Refills: 3 | Status: SHIPPED | OUTPATIENT
Start: 2024-07-29

## 2024-07-29 RX ADMIN — AMIODARONE HYDROCHLORIDE 200 MG: 200 TABLET ORAL at 09:15

## 2024-07-29 RX ADMIN — LEVOTHYROXINE SODIUM 25 MCG: 25 TABLET ORAL at 06:19

## 2024-07-29 RX ADMIN — PANTOPRAZOLE SODIUM 40 MG: 40 TABLET, DELAYED RELEASE ORAL at 06:19

## 2024-07-29 RX ADMIN — ESCITALOPRAM OXALATE 10 MG: 10 TABLET, FILM COATED ORAL at 09:15

## 2024-07-29 RX ADMIN — MEGESTROL ACETATE 20 MG: 20 TABLET ORAL at 09:15

## 2024-07-29 NOTE — CASE MANAGEMENT/SOCIAL WORK
Continued Stay Note  [facility]     Patient Name: Magan Lubin  MRN: [id number]  Today's Date: 7/29/2024    Admit Date: 7/27/2024    Plan: Plans dc home with assist of family and [facility].   Discharge Plan       Row Name 07/29/24 140       Plan    Plan Comments Received message from [facility] stating able to accept and patient is current. Patient, family and Abigail/RN informed.....Maria Ines Ca RN, CCP      Row Name 07/29/24 9321       Plan    Plan Plans dc home with assist of family and [facility].    Patient/Family in Agreement with Plan yes    Plan Comments Spoke with patient and his daughter Marguerite at bedside. States plan is home with family and [facility]. Referral placed and left voice message for [facility]. Await call back. Family to transport per private auto. Continue to follow......Maria Ines Ca RN, CCP                   Discharge Codes    No documentation.                 Expected Discharge Date and Time       Expected Discharge Date Expected Discharge Time    Jul 29, 2024               Maria Ines Ca, RN

## 2024-07-29 NOTE — PLAN OF CARE
"Goal Outcome Evaluation:  Plan of Care Reviewed With: patient           Outcome Evaluation: Pt is an 89 y/o M admitted after fall +hitting head resulting in closed head injury. Hx of dementia. Pt lives with his wife and reports being \"mostly\" independent with ADLs, use of RW for mobility. Today he performs fxl ambulation to/from bathroom using RW with CGA, somewhat unsteady but no overt LOB. Some cueing for safety. He completes toileting and grooming with sba, CGA for balance, LB dressing with spv. OT will cont to follow, recommending d/c home with HH vs SNF pending progress and assist available at home.      Anticipated Discharge Disposition (OT): home with assist, home with home health, skilled nursing facility                        "
Goal Outcome Evaluation:                           Discharge                     
Goal Outcome Evaluation:            Pt alert and agreeable to PT. He moved to eob with rail and cga. STS with min x1 to rwx. Pt amb 75'x2 with cga using rwx. Flexed knees and hips with shuffle pattern. Recommend up with asst at all times. Recommend stretching to achilles and hamstrings.                         
Goal Outcome Evaluation:  Plan of Care Reviewed With: patient           Outcome Evaluation: Pt admitted to [facility] s/p fall and laceration to head. Pt with history of dementia and states he lives with his wife with family support nearby, however per nsg unsure if history is accurate. Pt states he is I with ADLs and has a Rwx at home although does not typically use. Pt required Gabriella/CGA with mobility this date and as well as cues for safety with ambulation due to Rwx navigation. Pt would benefit from D/C with HHPT vs SNF pending family assist and progress during admission.      Anticipated Discharge Disposition (PT): home with home health, home with assist, skilled nursing facility                        
Goal Outcome Evaluation:  Plan of Care Reviewed With: patient        Progress: no change  Outcome Evaluation: VSS, laceration to head JUANA with staples, orientation fluctuates throughout the night, bed alarm on, resp panel negative, UA negative, call light in reach                               
Goal Outcome Evaluation:  Plan of Care Reviewed With: patient, daughter        Progress: no change  Outcome Evaluation: Pt is alert and oriented x4. Up with x1 and a walker. Takes pills whole with water. Staples on head JUANA. Eating at meal times. JOSHUA BABB. Worked with PT today. No complaints of pain. Will cont plan of care.                               
Chronic obstructive pulmonary disease, unspecified COPD type    DM (diabetes mellitus)    High cholesterol    HTN (hypertension)    Non Hodgkin's lymphoma    Pacemaker

## 2024-07-29 NOTE — OUTREACH NOTE
Prep Survey      Flowsheet Row Responses   [facility] facility patient discharged from? [facility]   Is LACE score < 7 ? No   Eligibility [facility]   Date of Admission 07/27/24   Date of Discharge 07/29/24   Discharge Disposition Home-Health Care Sv   Discharge diagnosis Closed head injury   Does the patient have one of the following disease processes/diagnoses(primary or secondary)? Other   Does the patient have Home health ordered? Yes   What is the Home health agency?  [facility]   Is there a DME ordered? No   Prep survey completed? Yes            KALYAN SNELL - Registered Nurse

## 2024-07-29 NOTE — DISCHARGE SUMMARY
Patient Name: Magan Lubin  : 1935  MRN: [id number]    Date of Admission: 2024  Date of Discharge:  2024  Primary Care Physician: Jo Ann Friend APRN      Discharge Diagnoses     Active Hospital Problems    Diagnosis  POA    **Closed head injury [S09.90XA]  Yes    Dementia without behavioral disturbance [F03.90]  Yes    Fall [W19.XXXA]  Yes    Hypertension [I10]  Yes    History of prostate cancer [Z85.46]  Not Applicable    Altered mental status [R41.82]  Yes    History of TIA (transient ischemic attack) [Z86.73]  Not Applicable    Atrial fibrillation [I48.91]  Yes    Hypothyroidism [E03.9]  Yes    History of CVA (cerebrovascular accident) [Z86.73]  Not Applicable    GERD (gastroesophageal reflux disease) [K21.9]  Yes    Carotid stenosis [I65.29]  Yes      Resolved Hospital Problems    Diagnosis Date Resolved POA    Hyperlipidemia [E78.5] 2024 Yes        Hospital Course     Brief admission history and physical.  Please refer to the H&P for full details.  A pleasant 89 years old gentleman who resides at home with his wife and has a very supportive family and children.  He has a past history of dementia/TIA/CVA/carotid peripheral vascular disease/dyslipidemia/anemia/hypertension/A-fib/benign prostatic hypertrophy who was brought to the emergency department secondary to a fall leading to head trauma and scalp laceration.  Of notation that he is on Eliquis/Plavix.  No other significant symptomatology.  His physical examination on admission included an afebrile patient with stable vital signs.  Rest of the examination is remarkable for an elderly frail gentleman with orientation times 2 out of 4.  Stapled scalp laceration and hematoma over the right parietal region.  +1 bilateral lower extremity edema.  No other significant abnormalities in the examination.  Hospital course.  The CBC was normal except a hemoglobin of 10.1.  Troponin elevated at 35.  CMP normal except a random blood sugar  of 113 and CO2 of 20.3.  CK normal.  Procalcitonin normal.  Repeat troponin is 25 with -10 delta.  UA without UTI.  Respiratory PCR panel was negative.  Chest x-ray revealed bibasilar atelectasis or infiltrate.  Right knee x-ray revealed a prosthesis with no evidence of fracture.  CT scan of the C-spine revealed no acute fractures but positive degenerative changes.  CT scan of the brain revealed atrophy with remote infarctions but no acute fracture or intracranial hemorrhage.  Hospital course will be summarized in a problem-oriented manner as below.  Fall leading to head trauma in a patient with a history of dementia/TIA/CVA.  Neurological musculoskeletal examination was without any acute abnormalities.  CT scan of the brain initially revealed old CVA and atrophy with small vessel disease.  CT scan of the neck without any acute fractures or subluxation.  Eliquis/Plavix/aspirin were held.  Repeat CAT scan of the brain next day revealed no new events or bleeds.  There was no evidence of infection with negative respiratory PCR, negative UA for UTI negative chest x-ray for infiltrates as I think this is mostly atelectasis take care changes in the chest x-ray.  CK was normal.  TSH/B12/folate were normal.  Physical therapy and Occupational Therapy also saw the patient and recommended home with home health physical therapy.  His mental status changes secondary to dementia remained at baseline.  His daughter.  I had a long discussion with the patient daughter about the risks and benefits of anticoagulation in this frail elderly gentleman with a high risk of fall and the decision was to stop anticoagulation.  Hyperlipidemia/carotid peripheral vascular disease status post stenting.  Lipitor was maintained.  Initially Plavix/aspirin/Eliquis were held and at the time of discharge the decision was to stop Eliquis after discussion of the pros and cons of anticoagulation and will resume him back on Plavix and aspirin.  He is to  follow-up with vascular surgery.  History of prostate cancer benign prostatic hypertrophy.  He was asymptomatic and UA was without UTI and Hytrin was maintained.  Hypothyroidism.  He was maintained on his current replacement.  TSH was checked during this admission and was normal.  GERD.  Remained asymptomatic with benign GI examination.  To be continued Protonix.  History of hypertension and atrial fibrillation.  Patient remained in normal sinus rhythm throughout the hospital stay.  Blood pressure remained under good control.  Lipitor and amiodarone were maintained.  After long discussion with the patient's daughter about the risks and benefits of anticoagulation and the decision was to stop Eliquis.  He will be placed back on aspirin and Plavix.  His last echo was on 7/2023 revealing a normal ejection fraction with a normal diastolic function and mild pulmonary hypertension and trace MR.    At the time of discharge he was neurologically intact except his orientation x 2 which is old.  He was hemodynamically stable.  He is to follow-up with primary MD/vascular surgery/neurology as scheduled.  Home health to follow-up with the patient.  Consultants     Consult Orders (all) (From admission, onward)       Start     Ordered    07/27/24 1734  [facility] (on-call MD unless specified) Details  Once        Specialty:  Hospitalist  Provider:  (Not yet assigned)    07/27/24 1733                  Procedures     Imaging Results (All)       Procedure Component Value Units Date/Time    CT Head Without Contrast [[id number]] Collected: 07/29/24 0746     Updated: 07/29/24 0805    Narrative:      CT HEAD WO CONTRAST-     HISTORY:  Head trauma; S09.90XA-Unspecified injury of head, initial  encounter; S01.01XA-Laceration without foreign body of scalp, initial  encounter; R41.82-Altered mental status, unspecified     COMPARISON: Comparison is made to multiple prior CT examinations of the  head with the most recent examination being the CT  examination performed  on 7/27/2024     FINDINGS: The brain and ventricles are symmetrical. The remote infarct  is appreciated involving the periventricular white matter of the left  frontal lobe extending to the anterior aspect of basal ganglia with  associated volume loss. Decreased attenuation involving the right  frontal lobe superolaterally is appreciated consistent with a remote  infarct, corresponding to larger area of acute infarction noted on MRI  examination 7/25/2023. No focal area of decreased attenuation to suggest  an acute infarct is identified. There is no evidence of hemorrhage,  hydrocephalus or midline shift. Further evaluation could be performed  with a MRI examination of the brain as indicated.     This report was finalized on 7/29/2024 8:02 AM by Dr. Aleksandr Lucas M.D  on Workstation: [id number]       CT Cervical Spine Without Contrast [[id number]] Collected: 07/27/24 1520     Updated: 07/27/24 1523    Narrative:      The dictation for this CT examination of the cervical spine was included  in the report for the CT examination of the head.           Radiation dose reduction techniques were utilized, including automated  exposure control and exposure modulation based on body size.        This report was finalized on 7/27/2024 3:20 PM by Dr. Aleksandr Lucas M.D  on Workstation: [id number]       CT Head Without Contrast [[id number]] Collected: 07/27/24 1444     Updated: 07/27/24 1507    Narrative:      CT HEAD WO CONTRAST-, CT CERVICAL SPINE WITHOUT CONTRAST     HISTORY:  fall/syncope, head injury on anticoagulation, neck pain     COMPARISON: MRI brain and cervical spine 7/25/2023 and CT head  7/25/2023.     CT HEAD WITHOUT CONTRAST     There is age-appropriate atrophy. A remote infarct is appreciated  involving the periventricular white matter left frontal lobe and lateral  aspect of the head of the caudate nucleus on the left, present  previously. There is no evidence of fracture, intracranial  hemorrhage or  of acute infarction/contusion. Moderate to severe vascular calcification  and mild to moderate small vessel ischemic disease is noted.     CT CERVICAL SPINE WITHOUT CONTRAST:     There is partial fusion of the C3 and C4 vertebral bodies. There is  solid osseous fusion of the facets to the right at C4-5 and to the left  at C3-4. There is no evidence of fracture.     C2-3: Moderate to severe facet degenerative disease is present on the  left.     C3-4: Mild central disc osteophyte complex is present with evidence of  herniation.     C4-5: There is no evidence of disc bulge or herniation.     C5-6: A far lateral disc osteophyte complex is present to the left.     C6-7: There is no evidence of disc bulge or herniation.     C7-T1: There is no evidence of a disc bulge or herniation.     This report was finalized on 7/27/2024 3:04 PM by Dr. Aleksandr Lucas M.D  on Workstation: [id number]       XR Knee 1 or 2 View Right [[id number]] Collected: 07/27/24 1349     Updated: 07/27/24 1352    Narrative:      RIGHT KNEE     HISTORY: Fall, pain.     COMPARISON: None.     FINDINGS: 2 views of the right knee demonstrates a right knee prosthesis  in place with evidence of fracture or of a suprapatellar fluid  collection. Moderate to severe vascular calcification is noted.     This report was finalized on 7/27/2024 1:49 PM by Dr. Aleksandr Lucas M.D  on Workstation: [id number]       XR Chest 1 View [[id number]] Collected: 07/27/24 1315     Updated: 07/27/24 1318    Narrative:      XR CHEST 1 VW-     CLINICAL HISTORY:  syncope     COMPARISON: Chest x-ray 7/25/2023     FINDINGS: A single portable view of the chest demonstrates the heart to  be within normal limits in size. There is mild bibasilar  atelectasis/infiltrate appreciated which is new versus prior  examination. There is no evidence of consolidation, gross effusion or of  congestive failure.           This report was finalized on 7/27/2024 1:15 PM by Dr. Brandon  "DESTINEY Lucas  on Workstation: [id number]               Pertinent Labs     Results from last 7 days   Lab Units 07/29/24  0552 07/28/24  0447 07/27/24  1300   WBC 10*3/mm3 4.91 7.02 6.84   HEMOGLOBIN g/dL 7.6* 8.1* 10.1*   PLATELETS 10*3/mm3 197 220 229     Results from last 7 days   Lab Units 07/29/24  0552 07/28/24  0447 07/27/24  1300   SODIUM mmol/L 139 140 139   POTASSIUM mmol/L 3.6 3.7 4.2   CHLORIDE mmol/L 107 111* 106   CO2 mmol/L 21.1* 22.0 20.3*   BUN mg/dL 15 19 18   CREATININE mg/dL 0.78 0.67* 0.87   GLUCOSE mg/dL 95 108* 113*   Estimated Creatinine Clearance: 70.3 mL/min (by C-G formula based on SCr of 0.78 mg/dL).  Results from last 7 days   Lab Units 07/27/24  1300   ALBUMIN g/dL 3.9   BILIRUBIN mg/dL 0.5   ALK PHOS U/L 76   AST (SGOT) U/L 18   ALT (SGPT) U/L 18     Results from last 7 days   Lab Units 07/29/24  0552 07/28/24  0447 07/27/24  1300   CALCIUM mg/dL 8.6 8.6 9.1   ALBUMIN g/dL  --   --  3.9       Results from last 7 days   Lab Units 07/27/24  1635 07/27/24  1300   CK TOTAL U/L 83  --    HSTROP T ng/L 25* 35*           Invalid input(s): \"LDLCALC\"      Imaging Results (Last 24 Hours)       Procedure Component Value Units Date/Time    CT Head Without Contrast [[id number]] Collected: 07/29/24 0746     Updated: 07/29/24 0805    Narrative:      CT HEAD WO CONTRAST-     HISTORY:  Head trauma; S09.90XA-Unspecified injury of head, initial  encounter; S01.01XA-Laceration without foreign body of scalp, initial  encounter; R41.82-Altered mental status, unspecified     COMPARISON: Comparison is made to multiple prior CT examinations of the  head with the most recent examination being the CT examination performed  on 7/27/2024     FINDINGS: The brain and ventricles are symmetrical. The remote infarct  is appreciated involving the periventricular white matter of the left  frontal lobe extending to the anterior aspect of basal ganglia with  associated volume loss. Decreased attenuation involving the " right  frontal lobe superolaterally is appreciated consistent with a remote  infarct, corresponding to larger area of acute infarction noted on MRI  examination 7/25/2023. No focal area of decreased attenuation to suggest  an acute infarct is identified. There is no evidence of hemorrhage,  hydrocephalus or midline shift. Further evaluation could be performed  with a MRI examination of the brain as indicated.     This report was finalized on 7/29/2024 8:02 AM by Dr. Aleksandr Lucas M.D  on Workstation: [id number]               Test Results Pending at Discharge         Discharge Exam   Physical Exam  Vitals.  Temperature 98.2 a pulse of 63 respirate rate of 20 and blood pressure 132/58 and O2 sats of 100% on room air.  General.  Elderly gentleman.  He is alert.  Oriented times 2 out of 4.  In no apparent pain/distress/diaphoresis.  Normal mood and affect.  HEENT.  Stapled laceration on the left parietal region with mild hematoma.  Pupils equal round and reactive.  Intact extraocular musculature.  No pallor or jaundice.  Moist mucous membrane.  Neck.  No C-spine tenderness.  Neck is supple.  No JVD.  No lymphadenopathy or thyromegaly.  Cardiovascular.  Regular rate and rhythm and grade 2 systolic murmur.  Chest.  Clear to auscultation bilaterally with no added sounds.  Abdomen.  Soft lax.  No tenderness.  No organomegaly.  No guarding or rebound.  Extremities.  No clubbing/cyanosis.  Trace bilateral lower extremity edema   CNS.  No acute focal neurological deficits  Discharge Details        Discharge Medications        New Medications        Instructions Start Date   acetaminophen 325 MG tablet  Commonly known as: TYLENOL   650 mg, Oral, Every 4 Hours PRN             Continue These Medications        Instructions Start Date   amiodarone 200 MG tablet  Commonly known as: PACERONE   200 mg, Oral, Daily      aspirin 81 MG EC tablet   81 mg, Oral, Daily      atorvastatin 80 MG tablet  Commonly known as: LIPITOR   80 mg,  Oral, Nightly      clopidogrel 75 MG tablet  Commonly known as: PLAVIX   75 mg, Oral, Daily      docusate sodium 100 MG capsule   100 mg, Oral, 2 Times Daily      escitalopram 10 MG tablet  Commonly known as: Lexapro   10 mg, Oral, Daily      furosemide 20 MG tablet  Commonly known as: Lasix   20 mg, Oral, Daily PRN      levothyroxine 25 MCG tablet  Commonly known as: SYNTHROID, LEVOTHROID   25 mcg, Oral, Every Early Morning      megestrol 20 MG tablet  Commonly known as: MEGACE   20 mg, Oral, Daily      ondansetron ODT 4 MG disintegrating tablet  Commonly known as: ZOFRAN-ODT   4 mg, Oral      pantoprazole 40 MG EC tablet  Commonly known as: PROTONIX   40 mg, Oral, Every Early Morning      potassium chloride 10 MEQ CR tablet   Take 2 tablets once daily prn on  days that lasix is taken      terazosin 2 MG capsule  Commonly known as: HYTRIN   2 mg, Oral, Daily             Stop These Medications      apixaban 5 MG tablet tablet  Commonly known as: ELIQUIS              Allergies   Allergen Reactions    Penicillins Unknown - Low Severity         Discharge Disposition:  Condition: Stable    Diet:   Diet Order   Procedures    Diet: Cardiac; Healthy Heart (2-3 Na+); Fluid Consistency: Thin (IDDSI 0)       Activity:   Activity Instructions       Activity as Tolerated      Other Activity Instructions      Activity Instructions: Home health PT and OT to evaluate and treat.  Ambulate with a walker.    Up WIth Assist              Counseling : Fall precautions    CODE STATUS:    Code Status and Medical Interventions: CPR (Attempt to Resuscitate); Full   Ordered at: 07/27/24 7020     Code Status (Patient has no pulse and is not breathing):    CPR (Attempt to Resuscitate)     Medical Interventions (Patient has pulse or is breathing):    Full       Future Appointments   Date Time Provider Department Center   9/3/2024  1:30 PM Molly Friend APRN [facility]   9/5/2024  1:00 PM Jo Ann Friend APRN [facility]      Additional Instructions for the Follow-ups that You Need to Schedule       Ambulatory Referral to Home Health   As directed      Face to Face Visit Date: 7/29/2024   Follow-up provider for Plan of Care?: I treated the patient in an acute care facility and will not continue treatment after discharge.   Follow-up provider: JO ANN MORROW [[id number]]   Reason/Clinical Findings: Falls/head trauma/dementia   Describe mobility limitations that make leaving home difficult: As above   Nursing/Therapeutic Services Requested: Physical Therapy Occupational Therapy   PT orders: Transfer training Gait Training Therapeutic exercise Strengthening Home safety assessment   Weight Bearing Status: As Tolerated   Occupational orders: Activities of daily living Strengthening Cognition Home safety assessment   Frequency: 1 Week 1        Call MD With Problems / Concerns   As directed      Instructions: Call MD or return to ER if headache/new focal neurological symptoms/change in the mental status above baseline/chest pain/shortness of breath/fever or chills    Order Comments: Instructions: Call MD or return to ER if headache/new focal neurological symptoms/change in the mental status above baseline/chest pain/shortness of breath/fever or chills         Discharge Follow-up with PCP   As directed       Currently Documented PCP:    Jo Ann Morrow, TYRONE    PCP Phone Number:    [phone]     Follow Up Details: Primary MD.  1 week.  Fall leading to head trauma/history of dementia/history of CVA and TIA/history of hyperlipidemia/history of peripheral vascular disease/anemia chronic disease/history of benign prostatic hypertrophy and prostate cancer/HTN/A-fib        Discharge Follow-up with Specified Provider: Neurology.  As scheduled.   As directed      To: Neurology.  As scheduled.   Follow Up Details: History of CVA.        Discharge Follow-up with Specified Provider: Vascular surgery.  As scheduled.   As directed      To: Vascular  surgery.  As scheduled.   Follow Up Details: Carotid peripheral vascular disease status post stenting               Follow-up Information       Jo Ann Friend, APRN .    Specialties: Nurse Practitioner, Family Medicine  Why: Primary MD.  1 week.  Fall leading to head trauma/history of dementia/history of CVA and TIA/history of hyperlipidemia/history of peripheral vascular disease/anemia chronic disease/history of benign prostatic hypertrophy and prostate cancer/HTN/A-fib  Contact information:  [address] [place] [id number] [phone]                               Time Spent on Discharge:  Greater than 30 minutes      Alba Calderon MD  [facility]  07/29/24  13:50 EDT

## 2024-07-29 NOTE — THERAPY TREATMENT NOTE
Patient Name: Magan Lubin  : 1935    MRN: [id number]                              Today's Date: 2024       Admit Date: 2024    Visit Dx:     ICD-10-CM ICD-9-CM   1. Closed head injury, initial encounter  S09.90XA 959.01   2. Laceration of scalp, initial encounter  S01.01XA 873.0   3. Altered mental status, unspecified altered mental status type  R41.82 780.97     Patient Active Problem List   Diagnosis    Bilateral carotid artery stenosis    History of prostate cancer    Benign prostatic hyperplasia without lower urinary tract symptoms    Left upper extremity numbness    Carotid stenosis    Ischemic stroke    Left upper lobe pulmonary nodule    Internal carotid artery stent present    Former smoker    GERD (gastroesophageal reflux disease)    History of CVA (cerebrovascular accident)    Generalized weakness    Localized edema    Hypothyroidism    Confusion    Memory loss    Medicare annual wellness visit, subsequent    Impacted cerumen of right ear    Atrial fibrillation    Ileus    History of TIA (transient ischemic attack)    Scalp lesion    Closed head injury    Altered mental status    Dementia without behavioral disturbance    Fall    Hypertension    History of prostate cancer     Past Medical History:   Diagnosis Date    Hyperlipidemia     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     2023    Personal history of transient ischemic attack (TIA), and cerebral infarction without residual deficits     2023    Prostate cancer     Squamous cell carcinoma of head and neck      Past Surgical History:   Procedure Laterality Date    APPENDECTOMY      HERNIA REPAIR      KNEE ARTHROPLASTY, PARTIAL REPLACEMENT Bilateral     OTHER SURGICAL HISTORY  2023    Carotid artery on 2023 TCAR      General Information       Row Name 24 1148          Physical Therapy Time and Intention    Document Type therapy note (daily note)  -SV     Mode of Treatment individual  therapy;physical therapy  -SV       Row Name 07/29/24 1148          General Information    Patient Profile Reviewed yes  -SV               User Key  (r) = Recorded By, (t) = Taken By, (c) = Cosigned By      Initials Name Provider Type    Cici Ryan, PT Physical Therapist                   Mobility       Row Name 07/29/24 1357          Bed Mobility    Supine-Sit [place] (Bed Mobility) contact guard  -SV     Assistive Device (Bed Mobility) bed rails;head of bed elevated  -SV     Comment, (Bed Mobility) labored  -SV       Row Name 07/29/24 1357          Sit-Stand Transfer    Sit-Stand [place] (Transfers) minimum assist (75% patient effort)  -SV     Assistive Device (Sit-Stand Transfers) walker, front-wheeled  -SV       Row Name 07/29/24 1357          Gait/Stairs (Locomotion)    [place] Level (Gait) contact guard  -SV     Assistive Device (Gait) walker, front-wheeled  -SV     Distance in Feet (Gait) 75  x2 after seated rest  -SV               User Key  (r) = Recorded By, (t) = Taken By, (c) = Cosigned By      Initials Name Provider Type    Cici Ryan, PT Physical Therapist                   Obj/Interventions       Row Name 07/29/24 1358          Motor Skills    Therapeutic Exercise --  educated on need for hamstring and achilles stretching , performed x2 margaret 20h  -SV       [facility] Name 07/29/24 1358          Balance    Comment, Balance posterior lean increased with laq  -SV               User Key  (r) = Recorded By, (t) = Taken By, (c) = Cosigned By      Initials Name Provider Type    Cici Ryan, PT Physical Therapist                   Goals/Plan    No documentation.                  Clinical Impression    No documentation.                  Outcome Measures       Row Name 07/29/24 1358          How much help from another person do you currently need...    Turning from your back to your side while in flat bed without using bedrails? 3  -SV     Moving from lying on back to sitting on  the side of a flat bed without bedrails? 3  -SV     Moving to and from a bed to a chair (including a wheelchair)? 3  -SV     Standing up from a chair using your arms (e.g., wheelchair, bedside chair)? 3  -SV     Climbing 3-5 steps with a railing? 3  -SV     To walk in hospital room? 3  -SV     AM-PAC 6 Clicks Score (PT) 18  -SV     Highest Level of Mobility Goal 6 --> Walk 10 steps or more  -SV               User Key  (r) = Recorded By, (t) = Taken By, (c) = Cosigned By      Initials Name Provider Type    SV Cici Ledbetter, PT Physical Therapist                                 Physical Therapy Education       Title: PT OT SLP Therapies (Resolved)       Topic: Physical Therapy (Resolved)       Point: Mobility training (Resolved)       Learning Progress Summary             Patient Acceptance, E,TB, VU by EB at 7/28/2024 1843    Acceptance, E, VU,NR by CN at 7/28/2024 1441   Family Acceptance, E,TB, VU by EB at 7/28/2024 1843                         Point: Home exercise program (Resolved)       Learning Progress Summary             Patient Acceptance, E,TB, VU by EB at 7/28/2024 1843    Acceptance, E, VU,NR by CN at 7/28/2024 1441   Family Acceptance, E,TB, VU by EB at 7/28/2024 1843                         Point: Body mechanics (Resolved)       Learning Progress Summary             Patient Acceptance, E,TB, VU by EB at 7/28/2024 1843    Acceptance, E, VU,NR by CN at 7/28/2024 1441   Family Acceptance, E,TB, VU by EB at 7/28/2024 1843                         Point: Precautions (Resolved)       Learning Progress Summary             Patient Acceptance, E,TB, VU by EB at 7/28/2024 1843    Acceptance, E, VU,NR by CN at 7/28/2024 1441   Family Acceptance, E,TB, VU by EB at 7/28/2024 1843                                         User Key       Initials Effective Dates Name Provider Type Discipline    CN 06/16/21 -  Cielo Lee, PT Physical Therapist PT    EB 03/28/22 -  Clarita Corona, RN Registered Nurse Nurse                   PT Recommendation and Plan           Time Calculation:         PT Charges       Row Name 07/29/24 1412             Time Calculation    Start Time 1148  -SV      Stop Time 1209  -SV      Time Calculation (min) 21 min  -SV      PT Received On 07/29/24  -SV      PT - Next Appointment 07/30/24  -                User Key  (r) = Recorded By, (t) = Taken By, (c) = Cosigned By      Initials Name Provider Type    SV Cici Ledbetter, PT Physical Therapist                  Therapy Charges for Today       Code Description Service Date Service Provider Modifiers Qty    18449352726 HC PT THERAPEUTIC ACT EA 15 MIN 7/29/2024 Cici Ledbetter, PT GP 1            PT G-Codes  Outcome Measure Options: AM-PAC 6 Clicks Basic Mobility (PT)  AM-PAC 6 Clicks Score (PT): 18  AM-PAC 6 Clicks Score (OT): 19  Modified [place] Scale: 4 - Moderately severe disability.  Unable to walk without assistance, and unable to attend to own bodily needs without assistance.       Cici Ledbetter, PT  7/29/2024

## 2024-07-29 NOTE — NURSING NOTE
Patient seemingly normal and cooperative while awake but frequently pulled leads and set off bed alarm overnight. Eventually was responsive to instruction to use call light but then called out every 45 minutes feeling like he needed to urinate. VSS. RA. No other issues overnight. Head laceration intact, JUANA c staples. Trace SS drainage.

## 2024-07-29 NOTE — DISCHARGE PLACEMENT REQUEST
"Magan Webb (89 y.o. Male)       Date of Birth   1935    Social Security Number       Address   [address]  [facility] [address]    Home Phone   [phone]    MRN   [id number]       Jainism   Orthodoxy    Marital Status                               Admission Date   7/27/24    Admission Type   Emergency    Admitting Provider   Ree Abad MD    Attending Provider   Alba Calderon MD    Department, Room/Bed   [address], P592/1       Discharge Date       Discharge Disposition       Discharge Destination                                 Attending Provider: Alba Calderon MD    Allergies: Penicillins    Isolation: None   Infection: None   Code Status: CPR    Ht: 182.9 cm (72.01\")   Wt: 74.5 kg (164 lb 3.9 oz)    Admission Cmt: None   Principal Problem: Closed head injury [S09.90XA]                   Active Insurance as of 7/27/2024       Primary Coverage       Payor Plan Insurance Group Employer/Plan Group    MEDICARE MEDICARE A & B        Payor Plan Address Payor Plan Phone Number Payor Plan Fax Number Effective Dates    [address] [phone]  6/1/2002 - None Entered    [place] [address]         Subscriber Name Subscriber Birth Date Member ID       MAGAN WEBB 1935 [id number]                     Emergency Contacts        (Rel.) Home Phone Work Phone Mobile Phone    JONTE (Spouse) -- -- [phone]    BerhaneRegine (Daughter) -- -- [phone]    Sonal Espana (Daughter) [phone] -- --                "

## 2024-07-29 NOTE — NURSING NOTE
Patient discharging to home with [facility]. Daughter in room with patient and she will transport patient home.  PIV removed and DC instructions reviewed with patient and daughter and understanding was verbalized.

## 2024-07-29 NOTE — CASE MANAGEMENT/SOCIAL WORK
Continued Stay Note  [facility]     Patient Name: Magan Lubin  MRN: [id number]  Today's Date: 7/29/2024    Admit Date: 7/27/2024    Plan: Plans dc home with assist of family and [facility].   Discharge Plan       Row Name 07/29/24 1339       Plan    Plan Plans dc home with assist of family and [facility].    Patient/Family in Agreement with Plan yes    Plan Comments Spoke with patient and his daughter Marguerite at bedside. States plan is home with family and [facility]. Referral placed and left voice message for [facility]. Await call back. Family to transport per private auto. Continue to follow......Maria Ines Ca RN, CCP                   Discharge Codes    No documentation.                       Maria Ines Ca, RN

## 2024-07-30 ENCOUNTER — TRANSITIONAL CARE MANAGEMENT TELEPHONE ENCOUNTER (OUTPATIENT)
Dept: CALL CENTER | Facility: HOSPITAL | Age: 89
End: 2024-07-30
Payer: MEDICARE

## 2024-07-30 NOTE — CASE MANAGEMENT/SOCIAL WORK
Case Management Discharge Note      Final Note: Home with assist of family and [facility] per private auto         Selected Continued Care - Discharged on 7/29/2024 Admission date: 7/27/2024 - Discharge disposition: Home-Health Care Svc      Destination    No services have been selected for the patient.                Durable Medical Equipment    No services have been selected for the patient.                Dialysis/Infusion    No services have been selected for the patient.                Home Medical Care       Service Provider Selected Services Address Phone Fax Patient Preferred    [facility] ,  Home Nursing [address] [place] [address], [address] [address] [phone] --              Therapy    No services have been selected for the patient.                Community Resources    No services have been selected for the patient.                Community & DME    No services have been selected for the patient.                    Transportation Services  Private: Car    Final Discharge Disposition Code: 06 - home with home health care

## 2024-07-30 NOTE — OUTREACH NOTE
Call Center TCM Note      Flowsheet Row Responses   [facility] patient discharged from? [place]   Does the patient have one of the following disease processes/diagnoses(primary or secondary)? Other   TCM attempt successful? Yes   Call start time 1305   Call end time 1312   Discharge diagnosis Fall, closed head injury, scalp laceration/staples   Person spoke with today (if not patient) and relationship daughter, Sonal Anthony reviewed with patient/caregiver? Yes  [Eliquis stopped.]   Does the patient have all medications ordered at discharge? Yes   Is the patient taking all medications as directed (includes completed medication regime)? Yes   Comments PCP Jo Ann HANSEN. Hospital follow up scheduled for 8/6/24  230pm with PCP.   Does the patient have an appointment with their PCP within 7-14 days of discharge? No   Nursing Interventions Assisted patient with making appointment per protocol, Routed TCM call to PCP office   What is the Home health agency?  [facility]   Has home health visited the patient within 72 hours of discharge? Call prior to 72 hours   Psychosocial issues? No   Did the patient receive a copy of their discharge instructions? Yes   Nursing interventions Reviewed instructions with patient  [daughter]   What is the patient's perception of their health status since discharge? Improving   Is the patient/caregiver able to teach back signs and symptoms related to disease process for when to call PCP? Yes   Is the patient/caregiver able to teach back signs and symptoms related to disease process for when to call 911? Yes   Is the patient/caregiver able to teach back the hierarchy of who to call/visit for symptoms/problems? PCP, Specialist, Home health nurse, Urgent Care, ED, 911 Yes   If the patient is a current smoker, are they able to teach back resources for cessation? Not a smoker   TCM call completed? Yes   Call end time 1312   Would this patient benefit from a Referral to [facility] Social Work?  No   Is the patient interested in additional calls from an ambulatory ? No            Karin Robles RN    7/30/2024, 13:14 EDT

## 2024-08-06 ENCOUNTER — OFFICE VISIT (OUTPATIENT)
Dept: FAMILY MEDICINE CLINIC | Age: 89
End: 2024-08-06
Payer: MEDICARE

## 2024-08-06 ENCOUNTER — LAB (OUTPATIENT)
Dept: LAB | Facility: HOSPITAL | Age: 89
End: 2024-08-06
Payer: MEDICARE

## 2024-08-06 VITALS
OXYGEN SATURATION: 94 % | TEMPERATURE: 98.2 F | HEART RATE: 65 BPM | WEIGHT: 168.6 LBS | BODY MASS INDEX: 22.84 KG/M2 | HEIGHT: 72 IN | SYSTOLIC BLOOD PRESSURE: 127 MMHG | DIASTOLIC BLOOD PRESSURE: 62 MMHG

## 2024-08-06 DIAGNOSIS — D64.9 ANEMIA, UNSPECIFIED TYPE: Primary | ICD-10-CM

## 2024-08-06 DIAGNOSIS — F03.90 DEMENTIA WITHOUT BEHAVIORAL DISTURBANCE: ICD-10-CM

## 2024-08-06 DIAGNOSIS — Z48.02 ENCOUNTER FOR STAPLE REMOVAL: ICD-10-CM

## 2024-08-06 DIAGNOSIS — D64.9 ANEMIA, UNSPECIFIED TYPE: ICD-10-CM

## 2024-08-06 PROBLEM — R41.0 CONFUSION: Status: RESOLVED | Noted: 2024-02-23 | Resolved: 2024-08-06

## 2024-08-06 PROBLEM — R20.0 LEFT UPPER EXTREMITY NUMBNESS: Status: RESOLVED | Noted: 2023-07-25 | Resolved: 2024-08-06

## 2024-08-06 PROBLEM — K56.7 ILEUS: Chronic | Status: RESOLVED | Noted: 2024-06-17 | Resolved: 2024-08-06

## 2024-08-06 PROBLEM — H61.21 IMPACTED CERUMEN OF RIGHT EAR: Status: RESOLVED | Noted: 2024-05-28 | Resolved: 2024-08-06

## 2024-08-06 PROBLEM — L98.9 SCALP LESION: Status: RESOLVED | Noted: 2024-07-03 | Resolved: 2024-08-06

## 2024-08-06 LAB
BASOPHILS # BLD AUTO: 0.01 10*3/MM3 (ref 0–0.2)
BASOPHILS NFR BLD AUTO: 0.2 % (ref 0–1.5)
DEPRECATED RDW RBC AUTO: 62.9 FL (ref 37–54)
EOSINOPHIL # BLD AUTO: 0.08 10*3/MM3 (ref 0–0.4)
EOSINOPHIL NFR BLD AUTO: 1.5 % (ref 0.3–6.2)
ERYTHROCYTE [DISTWIDTH] IN BLOOD BY AUTOMATED COUNT: 15.9 % (ref 12.3–15.4)
HCT VFR BLD AUTO: 27.3 % (ref 37.5–51)
HGB BLD-MCNC: 8.9 G/DL (ref 13–17.7)
IMM GRANULOCYTES # BLD AUTO: 0.02 10*3/MM3 (ref 0–0.05)
IMM GRANULOCYTES NFR BLD AUTO: 0.4 % (ref 0–0.5)
IRON 24H UR-MRATE: 43 MCG/DL (ref 59–158)
IRON SATN MFR SERPL: 15 % (ref 20–50)
LYMPHOCYTES # BLD AUTO: 1 10*3/MM3 (ref 0.7–3.1)
LYMPHOCYTES NFR BLD AUTO: 18.6 % (ref 19.6–45.3)
MCH RBC QN AUTO: 35.7 PG (ref 26.6–33)
MCHC RBC AUTO-ENTMCNC: 32.6 G/DL (ref 31.5–35.7)
MCV RBC AUTO: 109.6 FL (ref 79–97)
MONOCYTES # BLD AUTO: 0.66 10*3/MM3 (ref 0.1–0.9)
MONOCYTES NFR BLD AUTO: 12.2 % (ref 5–12)
NEUTROPHILS NFR BLD AUTO: 3.62 10*3/MM3 (ref 1.7–7)
NEUTROPHILS NFR BLD AUTO: 67.1 % (ref 42.7–76)
PLATELET # BLD AUTO: 265 10*3/MM3 (ref 140–450)
PMV BLD AUTO: 8.6 FL (ref 6–12)
RBC # BLD AUTO: 2.49 10*6/MM3 (ref 4.14–5.8)
TIBC SERPL-MCNC: 280 MCG/DL (ref 298–536)
TRANSFERRIN SERPL-MCNC: 188 MG/DL (ref 200–360)
WBC NRBC COR # BLD AUTO: 5.39 10*3/MM3 (ref 3.4–10.8)

## 2024-08-06 PROCEDURE — 1160F RVW MEDS BY RX/DR IN RCRD: CPT | Performed by: NURSE PRACTITIONER

## 2024-08-06 PROCEDURE — 85025 COMPLETE CBC W/AUTO DIFF WBC: CPT

## 2024-08-06 PROCEDURE — 1111F DSCHRG MED/CURRENT MED MERGE: CPT | Performed by: NURSE PRACTITIONER

## 2024-08-06 PROCEDURE — 99495 TRANSJ CARE MGMT MOD F2F 14D: CPT | Performed by: NURSE PRACTITIONER

## 2024-08-06 PROCEDURE — 15853 REMOVAL SUTR/STAPL XREQ ANES: CPT | Performed by: NURSE PRACTITIONER

## 2024-08-06 PROCEDURE — 84466 ASSAY OF TRANSFERRIN: CPT

## 2024-08-06 PROCEDURE — 83540 ASSAY OF IRON: CPT

## 2024-08-06 PROCEDURE — 36415 COLL VENOUS BLD VENIPUNCTURE: CPT

## 2024-08-06 PROCEDURE — 1159F MED LIST DOCD IN RCRD: CPT | Performed by: NURSE PRACTITIONER

## 2024-08-06 RX ORDER — FERROUS SULFATE 324(65)MG
324 TABLET, DELAYED RELEASE (ENTERIC COATED) ORAL
Qty: 30 TABLET | Refills: 3 | Status: SHIPPED | OUTPATIENT
Start: 2024-08-06

## 2024-08-06 NOTE — ASSESSMENT & PLAN NOTE
Patient may need an iron infusion if hemoglobin levels remain below 9.  If levels of hemoglobin are above 9 then oral iron supplementation is recommended.  Further treatment recommendations pending lab results.

## 2024-08-06 NOTE — PROGRESS NOTES
"Chief Complaint  Hospital Follow Up Visit ([facility] for a fall, 7.30.2024)    Subjective          Magan Lubin presents to [facility]     Patient is an 89-year-old male who is here today with his daughter, Sonal.  He was hospitalized from the 27th through the 29th at [facility].  He was admitted after a fall and a closed head injury.  Received a scalp laceration and 5 staples.  He did not receive any Plavix upon admission.  They debated on whether to start him back on blood thinners at discharge due to previous stroke versus risk of bleeding.  Patient and family have elected not to take Plavix any longer.  CT imaging were done of head and neck with no acute changes.  CT of the head was done on 2 different intervals.  Of note, hemoglobin was 7.6 on discharge.  Patient is not currently taking iron.  He needs staple removal from the scalp today.  Primary concern is memory loss and dementia.  He sees vascular but not seeing a dedicated neurology specialist.     Objective   Vital Signs:   Vitals:    08/06/24 1432   BP: 127/62   Pulse: 65   Temp: 98.2 °F (36.8 °C)   TempSrc: Oral   SpO2: 94%   Weight: 76.5 kg (168 lb 9.6 oz)   Height: 182.9 cm (72.01\")       Wt Readings from Last 3 Encounters:   08/06/24 76.5 kg (168 lb 9.6 oz)   07/28/24 74.5 kg (164 lb 3.9 oz)   07/03/24 73.1 kg (161 lb 3.2 oz)      BP Readings from Last 3 Encounters:   08/06/24 127/62   07/29/24 132/58   07/03/24 112/65       Body mass index is 22.86 kg/m².    BMI is within normal parameters. No other follow-up for BMI required.       Physical Exam  Vitals reviewed.   Constitutional:       General: He is not in acute distress.     Appearance: Normal appearance. He is well-developed.   HENT:      Head:        Right Ear: Tympanic membrane normal.      Left Ear: Tympanic membrane normal.   Neck:      Vascular: No carotid bruit.   Cardiovascular:      Rate and Rhythm: Normal rate and regular rhythm. "      Pulses:           Posterior tibial pulses are 2+ on the right side and 2+ on the left side.      Heart sounds: Normal heart sounds.   Pulmonary:      Effort: Pulmonary effort is normal.      Breath sounds: Normal breath sounds.   Musculoskeletal:      Right lower le+ No edema.      Left lower le+ No edema.   Skin:     General: Skin is warm and dry.   Neurological:      General: No focal deficit present.      Mental Status: He is alert.   Psychiatric:         Attention and Perception: Attention normal.         Mood and Affect: Mood and affect normal.         Behavior: Behavior normal.           Current Outpatient Medications:     acetaminophen (TYLENOL) 325 MG tablet, Take 2 tablets by mouth Every 4 (Four) Hours As Needed for Mild Pain., Disp: 120 tablet, Rfl: 3    amiodarone (PACERONE) 200 MG tablet, Take 1 tablet by mouth Daily., Disp: , Rfl:     aspirin 81 MG EC tablet, Take 1 tablet by mouth Daily., Disp: , Rfl:     atorvastatin (LIPITOR) 80 MG tablet, Take 1 tablet by mouth Every Night., Disp: 90 tablet, Rfl: 1    docusate sodium 100 MG capsule, Take 1 capsule by mouth 2 (Two) Times a Day., Disp: 30 capsule, Rfl: 0    escitalopram (Lexapro) 10 MG tablet, Take 1 tablet by mouth Daily., Disp: 30 tablet, Rfl: 5    furosemide (Lasix) 20 MG tablet, Take 1 tablet by mouth Daily As Needed (edema)., Disp: 60 tablet, Rfl: 1    levothyroxine (SYNTHROID, LEVOTHROID) 25 MCG tablet, Take 1 tablet by mouth Every Morning., Disp: 90 tablet, Rfl: 1    megestrol (MEGACE) 20 MG tablet, Take 1 tablet by mouth Daily., Disp: , Rfl:     ondansetron ODT (ZOFRAN-ODT) 4 MG disintegrating tablet, Take 1 tablet by mouth., Disp: , Rfl:     pantoprazole (PROTONIX) 40 MG EC tablet, Take 1 tablet by mouth Every Morning., Disp: 90 tablet, Rfl: 1    potassium chloride 10 MEQ CR tablet, Take 2 tablets once daily prn on  days that lasix is taken, Disp: 60 tablet, Rfl: 1    terazosin (HYTRIN) 2 MG capsule, Take 1 capsule by mouth  Daily., Disp: 90 capsule, Rfl: 1    ferrous sulfate 324 MG tablet delayed-release, Take 1 tablet by mouth Daily With Breakfast. And with 4 oz orange juice, Disp: 30 tablet, Rfl: 3   Past Medical History:   Diagnosis Date    Hyperlipidemia     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     8/16/2023    Personal history of transient ischemic attack (TIA), and cerebral infarction without residual deficits     8/16/2023    Prostate cancer     Squamous cell carcinoma of head and neck      Allergies   Allergen Reactions    Penicillins Unknown - Low Severity               Result Review :     Common labs          7/28/2024    04:47 7/28/2024    14:27 7/29/2024    05:52 8/6/2024    15:08   Common Labs   Glucose 108   95     BUN 19   15     Creatinine 0.67   0.78     Sodium 140   139     Potassium 3.7   3.6     Chloride 111   107     Calcium 8.6   8.6     WBC 7.02   4.91  5.39    Hemoglobin 8.1   7.6  8.9    Hematocrit 24.9   22.8  27.3    Platelets 220   197  265    Hemoglobin A1C  5.70           XR Abdomen Flat & Upright    Result Date: 6/16/2024  Nonspecific bowel gas pattern. Images reviewed, interpreted, and dictated by Dr. PORFIRIO Donald. Transcribed by Coral Santiago PA-C.    XR Abdomen Flat & Upright    Result Date: 6/15/2024  Distended bowel may be related to ileus or obstruction. Images reviewed, interpreted, and dictated by Dr. PORFIRIO Donald. Transcribed by Coral Santiago PA-C.    XR Abdomen Flat & Upright    Result Date: 6/14/2024  Diffusely dilated small bowel loops which is similar to previous and likely secondary to ileus or obstruction. Images reviewed, interpreted, and dictated by Dr. PORFIRIO Donald. Transcribed by Bernice Givens PA-C.    XR Abdomen Flat & Upright    Result Date: 6/13/2024  Findings are compatible with ileus. Images personally reviewed, interpreted and dictated by DAVID Lorenzo.          XR Abdomen 1 View    Result Date: 6/10/2024  Nasogastric tube  terminates in stomach. Images personally reviewed, interpreted and dictated by MARTINE Ellington M.D.    XR Abdomen 1 View    Result Date: 6/10/2024  NG tube is been retracted and the sidehole is now in the distal esophagus. Recommend advancing. Images reviewed, interpreted, and dictated by Shalonda Flores MD    XR Abdomen 1 View    Result Date: 2024  NG tube tip in stomach with sidehole is at the GE junction. Consider advancing. Dilated small bowel concerning for small bowel obstruction. Images reviewed, interpreted, and dictated by Dr. Shalonda Flores. Transcribed by Kahlil Crystal PA-C.    CT Abdomen Pelvis With Contrast    Result Date: 2024  1. Findings most consistent with a small bowel obstruction. Transition point not well visualized but likely in the pelvis. Associated mesenteric edema and small amount of mesenteric fluid. 2. Complex fat-containing umbilical hernia. Abnormal attenuation is present within the hernia. Incarceration not excluded. 3. Infrarenal abdominal aortic aneurysm. Images reviewed, interpreted, and dictated by Shalonda Flores MD         Suture Removal    Date/Time: 2024 3:06 PM    Performed by: Jo Ann Friend APRN  Authorized by: Jo Ann Friend APRN  Body area: head/neck  Location details: scalp  Wound Appearance: clean  Staples Removed: 5  Patient tolerance: patient tolerated the procedure well with no immediate complications         Social History     Tobacco Use   Smoking Status Former    Current packs/day: 0.00    Average packs/day: 1 pack/day for 58.0 years (58.0 ttl pk-yrs)    Types: Cigarettes    Start date:     Quit date:     Years since quittin.6    Passive exposure: Past   Smokeless Tobacco Never           Assessment and Plan    Diagnoses and all orders for this visit:    1. Anemia, unspecified type (Primary)  Assessment & Plan:  Patient may need an iron infusion if hemoglobin levels remain below 9.  If levels of hemoglobin are above 9 then oral iron  supplementation is recommended.  Further treatment recommendations pending lab results.    Orders:  -     CBC w AUTO Differential; Future  -     Iron and TIBC; Future  -     ferrous sulfate 324 MG tablet delayed-release; Take 1 tablet by mouth Daily With Breakfast. And with 4 oz orange juice  Dispense: 30 tablet; Refill: 3    2. Dementia without behavioral disturbance  -     Ambulatory Referral to Neurology    3. Encounter for staple removal  -     Suture Removal        Follow Up    No follow-ups on file.  Patient was given instructions and counseling regarding his condition or for health maintenance advice. Please see specific information pulled into the AVS if appropriate.

## 2024-08-07 ENCOUNTER — READMISSION MANAGEMENT (OUTPATIENT)
Dept: CALL CENTER | Facility: HOSPITAL | Age: 89
End: 2024-08-07
Payer: MEDICARE

## 2024-08-07 DIAGNOSIS — D64.9 ANEMIA, UNSPECIFIED TYPE: Primary | ICD-10-CM

## 2024-08-07 NOTE — OUTREACH NOTE
Medical Week 2 Survey      Flowsheet Row Responses   [facility] patient discharged from? [place]   Does the patient have one of the following disease processes/diagnoses(primary or secondary)? Other   Week 2 attempt successful? No   Unsuccessful attempts Attempt 1            Elba TIMMONS - Licensed Nurse

## 2024-08-07 NOTE — PROGRESS NOTES
Hemoglobin and hematocrit are improving but not yet to normal.  Hemoglobin levels less than 9 can benefit from an iron infusion.  Referral to hematologist would be required.  Hemoglobin levels of 9 or above are often best treated with some type of oral iron supplementation.  Level should also be repeated again in approximately 6 weeks to assure that they are returning to normal.

## 2024-08-23 NOTE — PROGRESS NOTES
CC: f/u stroke and dementia    HPI:  Magan Lubin is a  89 y.o.  right-handed male, former smoker, with history of carotid stenosis status post previous right TCAR, afib not currently anticoagulated, prostate cancer, and hyperlipidemia who is being seen in follow-up today for history of stroke and dementia.  He was last seen by me in March 2024.    In July 2023 he was admitted to [facility] due to not feeling well with associated left-sided weakness.  CTA head/neck showed 50-60% left ICA stenosis and 70% right ICA stenosis.  MRI brain showed scattered strokes in the right cerebral hemisphere.  He ultimately underwent right TCAR and was discharged on aspirin 325 mg daily and Plavix 75 mg daily.  Lipitor was increased to 80 mg daily.  He was also exhibiting memory issues while in the hospital concerning for dementia.  When he was seen in the office in September 2023 he scored 12 out of 30 on the MoCA.  Family has since installed cameras and taken his keys.  They see him daily and watch him very closely.  Of note, there are no guns in the home.  When I last saw him I started on Lexapro due to anxiety and increased crying.  He has since had to come off of this due to interaction but daughter did not note any significant improvement and thinks the patient was seeing bugs and frogs in the house during the time that he was on Lexapro.  He has had some worsening memory since I last saw him.    Since our last visit he had an admission in June 2024 for complaints of nausea and vomiting is found to have evidence of umbilical hernia that was strangulated causing small bowel obstruction.  He underwent surgical intervention and did have 1 episode of rapid A-fib.  He was started on Eliquis due to elevated chads 2 score.  Plavix was discontinued and aspirin was continued however Plavix was later restarted due to history of stent.  He had another admission at the end of July 2024 where he had a fall at home with  laceration on his posterior scalp.  Unsure why he fell--patient denied syncope.  CT head x2 showed no acute findings. It was ultimately decided during that hospitalization to stop his Eliquis and continue DAPT after discussion with family.  He followed up with cardiology 8/7/2024 and ECG showed A-fib.  He is scheduled to have Watchman device Sept 18.    No recent falls.  He does use a walker when he remembers to.  He is in a wheelchair today.    He had labs completed 7/28/2024: B12 388, TSH 6.8, folate 5.54.  Past Medical History:   Diagnosis Date    Hyperlipidemia     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     8/16/2023    Personal history of transient ischemic attack (TIA), and cerebral infarction without residual deficits     8/16/2023    Prostate cancer     Squamous cell carcinoma of head and neck          Past Surgical History:   Procedure Laterality Date    APPENDECTOMY      HERNIA REPAIR      KNEE ARTHROPLASTY, PARTIAL REPLACEMENT Bilateral     OTHER SURGICAL HISTORY  07/31/2023    Carotid artery on July 31, 2023 TCAR           Current Outpatient Medications:     acetaminophen (TYLENOL) 325 MG tablet, Take 2 tablets by mouth Every 4 (Four) Hours As Needed for Mild Pain., Disp: 120 tablet, Rfl: 3    amiodarone (PACERONE) 200 MG tablet, Take 1 tablet by mouth Daily., Disp: , Rfl:     aspirin 81 MG EC tablet, Take 1 tablet by mouth Daily., Disp: , Rfl:     atorvastatin (LIPITOR) 80 MG tablet, Take 1 tablet by mouth Every Night., Disp: 90 tablet, Rfl: 1    bumetanide (BUMEX) 1 MG tablet, Take 1 tablet by mouth Daily., Disp: , Rfl:     clopidogrel (PLAVIX) 75 MG tablet, Take 1 tablet by mouth Daily., Disp: , Rfl:     docusate sodium 100 MG capsule, Take 1 capsule by mouth 2 (Two) Times a Day., Disp: 30 capsule, Rfl: 0    ferrous sulfate 324 MG tablet delayed-release, Take 1 tablet by mouth Daily With Breakfast. And with 4 oz orange juice, Disp: 30 tablet, Rfl: 3    levothyroxine (SYNTHROID,  LEVOTHROID) 25 MCG tablet, Take 1 tablet by mouth Every Morning., Disp: 90 tablet, Rfl: 1    Melatonin 10 MG sublingual tablet, Place 1 tablet under the tongue Every Night., Disp: , Rfl:     ondansetron ODT (ZOFRAN-ODT) 4 MG disintegrating tablet, Take 1 tablet by mouth., Disp: , Rfl:     pantoprazole (PROTONIX) 40 MG EC tablet, Take 1 tablet by mouth Every Morning., Disp: 90 tablet, Rfl: 1    potassium chloride 10 MEQ CR tablet, Take 2 tablets once daily prn on  days that lasix is taken, Disp: 60 tablet, Rfl: 1    terazosin (HYTRIN) 2 MG capsule, Take 1 capsule by mouth Daily., Disp: 90 capsule, Rfl: 1    cyanocobalamin ([employer] Vitamin B-12) 1000 MCG tablet, Take 1 tablet by mouth Daily., Disp: 100 tablet, Rfl: 2    escitalopram (Lexapro) 10 MG tablet, Take 1 tablet by mouth Daily. (Patient not taking: Reported on 9/3/2024), Disp: 30 tablet, Rfl: 5    furosemide (Lasix) 20 MG tablet, Take 1 tablet by mouth Daily As Needed (edema). (Patient not taking: Reported on 9/3/2024), Disp: 60 tablet, Rfl: 1    megestrol (MEGACE) 20 MG tablet, Take 1 tablet by mouth Daily. (Patient not taking: Reported on 9/3/2024), Disp: , Rfl:       Family History   Problem Relation Age of Onset    Heart disease Mother     Cancer Father          Social History     Socioeconomic History    Marital status:    Tobacco Use    Smoking status: Former     Current packs/day: 0.00     Average packs/day: 1 pack/day for 58.0 years (58.0 ttl pk-yrs)     Types: Cigarettes     Start date:      Quit date:      Years since quittin.6     Passive exposure: Past    Smokeless tobacco: Never   Vaping Use    Vaping status: Never Used   Substance and Sexual Activity    Alcohol use: Yes     Comment: occasional    Drug use: Defer    Sexual activity: Defer         Allergies   Allergen Reactions    Penicillins Unknown - Low Severity         Physical Exam:  Vitals:    24 1335   BP: 120/70   Pulse: 67   SpO2: 96%   Weight: 75 kg (165 lb 6.4 oz)  "  Height: 182.9 cm (72\")     Orthostatic BP:    Body mass index is 22.43 kg/m².    General appearance: Well developed, well nourished, well groomed, alert and cooperative.   HEENT: Normocephalic.   Cardiac: Regular rate and rhythm.  Chest Exam: Clear to auscultation bilaterally, no wheezes, no rhonchi.  Extremities: BLE edema.  Skin: No rashes or birthmarks.     Higher integrative function: Awake and alert, oriented to self, states it is December, not oriented to year.  States he is at [place].  Impaired recent/remote memory, attention/concentration.  Speech fluent, no neglect.  Cranial nerves: Visual fields intact, extraocular moods full without nystagmus, PERRL, normal facial sensation, face symmetric, tongue midline, no dysarthria.  Motor: Normal muscle strength, bulk, and tone in upper and lower extremities. No fasciculations, rigidity, spasticity or abnormal movements.   Sensation: Intact to LT in all extremities.   Station and gait: Deferred.  Coordination: Finger to nose test showed no dysmetria.       Results:      Lab Results   Component Value Date    GLUCOSE 95 07/29/2024    BUN 15 07/29/2024    CREATININE 0.78 07/29/2024    BCR 19.2 07/29/2024    CO2 21.1 (L) 07/29/2024    CALCIUM 8.6 07/29/2024    ALBUMIN 3.9 07/27/2024    LABIL2 1.4 06/02/2021    AST 18 07/27/2024    ALT 18 07/27/2024       Lab Results   Component Value Date    WBC 5.39 08/06/2024    HGB 8.9 (L) 08/06/2024    HCT 27.3 (L) 08/06/2024    .6 (H) 08/06/2024     08/06/2024         .No results found for: \"RPR\"      Lab Results   Component Value Date    TSH 6.810 (H) 07/28/2024    A8KNQEQ 7.92 09/15/2023    THYROIDAB 14 09/15/2023         Lab Results   Component Value Date    [id number] 388 07/28/2024         Lab Results   Component Value Date    FOLATE 5.54 07/28/2024         Lab Results   Component Value Date    HGBA1C 5.70 (H) 07/28/2024         Lab Results   Component Value Date    GLUCOSE 95 07/29/2024    BUN 15 07/29/2024    " CREATININE 0.78 07/29/2024    BCR 19.2 07/29/2024    K 3.6 07/29/2024    CO2 21.1 (L) 07/29/2024    CALCIUM 8.6 07/29/2024    ALBUMIN 3.9 07/27/2024    LABIL2 1.4 06/02/2021    AST 18 07/27/2024    ALT 18 07/27/2024         Lab Results   Component Value Date    WBC 5.39 08/06/2024    HGB 8.9 (L) 08/06/2024    HCT 27.3 (L) 08/06/2024    .6 (H) 08/06/2024     08/06/2024             Assessment/Plan:        Diagnoses and all orders for this visit:    1. Moderate dementia with mood disturbance, unspecified dementia type (Primary)    2. Stenosis of left carotid artery  -     Ambulatory Referral to Vascular Surgery    Other orders  -     cyanocobalamin ([employer] Vitamin B-12) 1000 MCG tablet; Take 1 tablet by mouth Daily.  Dispense: 100 tablet; Refill: 2      For stroke prevention  He is currently on low-dose aspirin and Plavix 75 mg daily, and long-term it would be ideal if he only needed 1 antiplatelet medication.  I am referring him back to vascular surgery so they can weigh in as well as monitor left ICA stenosis.  Blood pressure control to <130/80  Goal LDL <70-recommend high dose statins- psychiatrist or no at 10 to try to use geropsych cont lipitor 80mg   Serum glucose < 140  Call 911 for stroke any stroke symptoms    For dementia-once he is off amiodarone can consider starting Aricept or trying another antidepressant for mood issues. B12 is borderline low- start oral replacement.    He is at risk for stroke from A-fib (not on anticoagulation) but is also at increased risk for bleeding due to falls if he were on anticoagulation.  He is undergoing Watchman device implant 9/18.    Follow-up in 6 to 9 months or sooner if needed      Total time:>40 min          Dictated utilizing Dragon dictation.

## 2024-08-28 ENCOUNTER — OUTSIDE FACILITY SERVICE (OUTPATIENT)
Dept: FAMILY MEDICINE CLINIC | Age: 89
End: 2024-08-28
Payer: MEDICARE

## 2024-08-28 PROCEDURE — G0179 MD RECERTIFICATION HHA PT: HCPCS | Performed by: NURSE PRACTITIONER

## 2024-09-03 ENCOUNTER — OFFICE VISIT (OUTPATIENT)
Dept: NEUROLOGY | Facility: CLINIC | Age: 89
End: 2024-09-03
Payer: MEDICARE

## 2024-09-03 VITALS
BODY MASS INDEX: 22.4 KG/M2 | HEIGHT: 72 IN | DIASTOLIC BLOOD PRESSURE: 70 MMHG | SYSTOLIC BLOOD PRESSURE: 120 MMHG | WEIGHT: 165.4 LBS | HEART RATE: 67 BPM | OXYGEN SATURATION: 96 %

## 2024-09-03 DIAGNOSIS — F03.B3 MODERATE DEMENTIA WITH MOOD DISTURBANCE, UNSPECIFIED DEMENTIA TYPE: Primary | ICD-10-CM

## 2024-09-03 DIAGNOSIS — I65.22 STENOSIS OF LEFT CAROTID ARTERY: ICD-10-CM

## 2024-09-03 PROBLEM — Z48.02 ENCOUNTER FOR STAPLE REMOVAL: Status: RESOLVED | Noted: 2024-08-06 | Resolved: 2024-09-03

## 2024-09-03 PROBLEM — Z00.00 MEDICARE ANNUAL WELLNESS VISIT, SUBSEQUENT: Status: RESOLVED | Noted: 2024-05-28 | Resolved: 2024-09-03

## 2024-09-03 PROBLEM — Z85.46 HISTORY OF PROSTATE CANCER: Status: RESOLVED | Noted: 2022-08-25 | Resolved: 2024-09-03

## 2024-09-03 RX ORDER — CLOPIDOGREL BISULFATE 75 MG/1
1 TABLET ORAL DAILY
COMMUNITY
Start: 2024-08-26

## 2024-09-03 RX ORDER — BUMETANIDE 1 MG/1
1 TABLET ORAL DAILY
COMMUNITY
Start: 2024-08-12 | End: 2024-09-11

## 2024-09-03 NOTE — LETTER
September 3, 2024       No Recipients    Patient: Magan Lubin   YOB: 1935   Date of Visit: 9/3/2024     Dear TYRONE Chacon:       Thank you for referring Magan Lubin to me for evaluation. Below are the relevant portions of my assessment and plan of care.    If you have questions, please do not hesitate to call me. I look forward to following Magan along with you.         Sincerely,        TYRONE Chambers        CC:   No Recipients    Molly Friend APRN  09/03/24 1423  Sign when Signing Visit  CC: f/u stroke and dementia    HPI:  Magan Lubin is a  89 y.o.  right-handed male, former smoker, with history of carotid stenosis status post previous right TCAR, afib not currently anticoagulated, prostate cancer, and hyperlipidemia who is being seen in follow-up today for history of stroke and dementia.  He was last seen by me in March 2024.    In July 2023 he was admitted to [facility] due to not feeling well with associated left-sided weakness.  CTA head/neck showed 50-60% left ICA stenosis and 70% right ICA stenosis.  MRI brain showed scattered strokes in the right cerebral hemisphere.  He ultimately underwent right TCAR and was discharged on aspirin 325 mg daily and Plavix 75 mg daily.  Lipitor was increased to 80 mg daily.  He was also exhibiting memory issues while in the hospital concerning for dementia.  When he was seen in the office in September 2023 he scored 12 out of 30 on the MoCA.  Family has since installed cameras and taken his keys.  They see him daily and watch him very closely.  Of note, there are no guns in the home.  When I last saw him I started on Lexapro due to anxiety and increased crying.  He has since had to come off of this due to interaction but daughter did not note any significant improvement and thinks the patient was seeing bugs and frogs in the house during the time that he was on Lexapro.  He has had some worsening memory  since I last saw him.    Since our last visit he had an admission in June 2024 for complaints of nausea and vomiting is found to have evidence of umbilical hernia that was strangulated causing small bowel obstruction.  He underwent surgical intervention and did have 1 episode of rapid A-fib.  He was started on Eliquis due to elevated chads 2 score.  Plavix was discontinued and aspirin was continued however Plavix was later restarted due to history of stent.  He had another admission at the end of July 2024 where he had a fall at home with laceration on his posterior scalp.  Unsure why he fell--patient denied syncope.  CT head x2 showed no acute findings. It was ultimately decided during that hospitalization to stop his Eliquis and continue DAPT after discussion with family.  He followed up with cardiology 8/7/2024 and ECG showed A-fib.  He is scheduled to have Watchman device Sept 18.    No recent falls.  He does use a walker when he remembers to.  He is in a wheelchair today.    He had labs completed 7/28/2024: B12 388, TSH 6.8, folate 5.54.  Past Medical History:   Diagnosis Date   • Hyperlipidemia    • 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     8/16/2023   • Personal history of transient ischemic attack (TIA), and cerebral infarction without residual deficits     8/16/2023   • Prostate cancer    • Squamous cell carcinoma of head and neck          Past Surgical History:   Procedure Laterality Date   • APPENDECTOMY     • HERNIA REPAIR     • KNEE ARTHROPLASTY, PARTIAL REPLACEMENT Bilateral    • OTHER SURGICAL HISTORY  07/31/2023    Carotid artery on July 31, 2023 TCAR           Current Outpatient Medications:   •  acetaminophen (TYLENOL) 325 MG tablet, Take 2 tablets by mouth Every 4 (Four) Hours As Needed for Mild Pain., Disp: 120 tablet, Rfl: 3  •  amiodarone (PACERONE) 200 MG tablet, Take 1 tablet by mouth Daily., Disp: , Rfl:   •  aspirin 81 MG EC tablet, Take 1 tablet by mouth Daily., Disp: , Rfl:    •  atorvastatin (LIPITOR) 80 MG tablet, Take 1 tablet by mouth Every Night., Disp: 90 tablet, Rfl: 1  •  bumetanide (BUMEX) 1 MG tablet, Take 1 tablet by mouth Daily., Disp: , Rfl:   •  clopidogrel (PLAVIX) 75 MG tablet, Take 1 tablet by mouth Daily., Disp: , Rfl:   •  docusate sodium 100 MG capsule, Take 1 capsule by mouth 2 (Two) Times a Day., Disp: 30 capsule, Rfl: 0  •  ferrous sulfate 324 MG tablet delayed-release, Take 1 tablet by mouth Daily With Breakfast. And with 4 oz orange juice, Disp: 30 tablet, Rfl: 3  •  levothyroxine (SYNTHROID, LEVOTHROID) 25 MCG tablet, Take 1 tablet by mouth Every Morning., Disp: 90 tablet, Rfl: 1  •  Melatonin 10 MG sublingual tablet, Place 1 tablet under the tongue Every Night., Disp: , Rfl:   •  ondansetron ODT (ZOFRAN-ODT) 4 MG disintegrating tablet, Take 1 tablet by mouth., Disp: , Rfl:   •  pantoprazole (PROTONIX) 40 MG EC tablet, Take 1 tablet by mouth Every Morning., Disp: 90 tablet, Rfl: 1  •  potassium chloride 10 MEQ CR tablet, Take 2 tablets once daily prn on  days that lasix is taken, Disp: 60 tablet, Rfl: 1  •  terazosin (HYTRIN) 2 MG capsule, Take 1 capsule by mouth Daily., Disp: 90 capsule, Rfl: 1  •  cyanocobalamin ([employer] Vitamin B-12) 1000 MCG tablet, Take 1 tablet by mouth Daily., Disp: 100 tablet, Rfl: 2  •  escitalopram (Lexapro) 10 MG tablet, Take 1 tablet by mouth Daily. (Patient not taking: Reported on 9/3/2024), Disp: 30 tablet, Rfl: 5  •  furosemide (Lasix) 20 MG tablet, Take 1 tablet by mouth Daily As Needed (edema). (Patient not taking: Reported on 9/3/2024), Disp: 60 tablet, Rfl: 1  •  megestrol (MEGACE) 20 MG tablet, Take 1 tablet by mouth Daily. (Patient not taking: Reported on 9/3/2024), Disp: , Rfl:       Family History   Problem Relation Age of Onset   • Heart disease Mother    • Cancer Father          Social History     Socioeconomic History   • Marital status:    Tobacco Use   • Smoking status: Former     Current packs/day: 0.00      "Average packs/day: 1 pack/day for 58.0 years (58.0 ttl pk-yrs)     Types: Cigarettes     Start date:      Quit date: 2013     Years since quittin.6     Passive exposure: Past   • Smokeless tobacco: Never   Vaping Use   • Vaping status: Never Used   Substance and Sexual Activity   • Alcohol use: Yes     Comment: occasional   • Drug use: Defer   • Sexual activity: Defer         Allergies   Allergen Reactions   • Penicillins Unknown - Low Severity         Physical Exam:  Vitals:    24 1335   BP: 120/70   Pulse: 67   SpO2: 96%   Weight: 75 kg (165 lb 6.4 oz)   Height: 182.9 cm (72\")     Orthostatic BP:    Body mass index is 22.43 kg/m².    General appearance: Well developed, well nourished, well groomed, alert and cooperative.   HEENT: Normocephalic.   Cardiac: Regular rate and rhythm.  Chest Exam: Clear to auscultation bilaterally, no wheezes, no rhonchi.  Extremities: BLE edema.  Skin: No rashes or birthmarks.     Higher integrative function: Awake and alert, oriented to self, states it is December, not oriented to year.  States he is at [place].  Impaired recent/remote memory, attention/concentration.  Speech fluent, no neglect.  Cranial nerves: Visual fields intact, extraocular moods full without nystagmus, PERRL, normal facial sensation, face symmetric, tongue midline, no dysarthria.  Motor: Normal muscle strength, bulk, and tone in upper and lower extremities. No fasciculations, rigidity, spasticity or abnormal movements.   Sensation: Intact to LT in all extremities.   Station and gait: Deferred.  Coordination: Finger to nose test showed no dysmetria.       Results:      Lab Results   Component Value Date    GLUCOSE 95 2024    BUN 15 2024    CREATININE 0.78 2024    BCR 19.2 2024    CO2 21.1 (L) 2024    CALCIUM 8.6 2024    ALBUMIN 3.9 2024    LABIL2 1.4 2021    AST 18 2024    ALT 18 2024       Lab Results   Component Value Date    WBC 5.39 " "08/06/2024    HGB 8.9 (L) 08/06/2024    HCT 27.3 (L) 08/06/2024    .6 (H) 08/06/2024     08/06/2024         .No results found for: \"RPR\"      Lab Results   Component Value Date    TSH 6.810 (H) 07/28/2024    H8VXRVL 7.92 09/15/2023    THYROIDAB 14 09/15/2023         Lab Results   Component Value Date    [id number] 388 07/28/2024         Lab Results   Component Value Date    FOLATE 5.54 07/28/2024         Lab Results   Component Value Date    HGBA1C 5.70 (H) 07/28/2024         Lab Results   Component Value Date    GLUCOSE 95 07/29/2024    BUN 15 07/29/2024    CREATININE 0.78 07/29/2024    BCR 19.2 07/29/2024    K 3.6 07/29/2024    CO2 21.1 (L) 07/29/2024    CALCIUM 8.6 07/29/2024    ALBUMIN 3.9 07/27/2024    LABIL2 1.4 06/02/2021    AST 18 07/27/2024    ALT 18 07/27/2024         Lab Results   Component Value Date    WBC 5.39 08/06/2024    HGB 8.9 (L) 08/06/2024    HCT 27.3 (L) 08/06/2024    .6 (H) 08/06/2024     08/06/2024             Assessment/Plan:        Diagnoses and all orders for this visit:    1. Moderate dementia with mood disturbance, unspecified dementia type (Primary)    2. Stenosis of left carotid artery  -     Ambulatory Referral to Vascular Surgery    Other orders  -     cyanocobalamin ([employer] Vitamin B-12) 1000 MCG tablet; Take 1 tablet by mouth Daily.  Dispense: 100 tablet; Refill: 2      For stroke prevention  He is currently on low-dose aspirin and Plavix 75 mg daily, and long-term it would be ideal if he only needed 1 antiplatelet medication.  I am referring him back to vascular surgery so they can weigh in as well as monitor left ICA stenosis.  Blood pressure control to <130/80  Goal LDL <70-recommend high dose statins- psychiatrist or no at 10 to try to use geropsych cont lipitor 80mg   Serum glucose < 140  Call 911 for stroke any stroke symptoms    For dementia-once he is off amiodarone can consider starting Aricept or trying another antidepressant for mood issues. B12 " is borderline low- start oral replacement.    He is at risk for stroke from A-fib (not on anticoagulation) but is also at increased risk for bleeding due to falls if he were on anticoagulation.  He is undergoing Watchman device implant 9/18.    Follow-up in 6 to 9 months or sooner if needed      Total time:>40 min          Dictated utilizing Dragon dictation.

## 2024-10-04 ENCOUNTER — TELEPHONE (OUTPATIENT)
Dept: NEUROLOGY | Facility: CLINIC | Age: 89
End: 2024-10-04
Payer: MEDICARE

## 2024-10-04 NOTE — TELEPHONE ENCOUNTER
[facility] form vascular called.  Pt had referral to them and needed to defer the referral for a short time as he was having surgery.  Pt is not trying to schedule and the referral is cllosed.  Please call Laura at [phone].

## 2024-10-14 ENCOUNTER — TELEPHONE (OUTPATIENT)
Dept: FAMILY MEDICINE CLINIC | Age: 89
End: 2024-10-14
Payer: MEDICARE

## 2024-10-14 NOTE — TELEPHONE ENCOUNTER
Caller: Regine Last    Relationship to patient: Emergency Contact    Best call back number: [phone]     PATIENT DAUGHTER WOULD LIKE A  CALL BACK TO SEE WHAT LABS PATIENT IS NEEDING TO COMPLETE.

## 2024-10-14 NOTE — TELEPHONE ENCOUNTER
I advised Reigne that pt needs his IRON and TIBC and CBC.  She is asking if he needs to have his LFTs checked due to being on Atorvastatin and his TSH.  She is trying to get everything done at once when she brings him to get his flu shot so she doesn't have to get him back out of the house.

## 2024-10-15 DIAGNOSIS — Z86.73 HISTORY OF TIA (TRANSIENT ISCHEMIC ATTACK): ICD-10-CM

## 2024-10-15 DIAGNOSIS — Z86.73 HISTORY OF STROKE: ICD-10-CM

## 2024-10-15 DIAGNOSIS — Z86.73 HISTORY OF CVA (CEREBROVASCULAR ACCIDENT): ICD-10-CM

## 2024-10-15 DIAGNOSIS — I63.89 OTHER CEREBRAL INFARCTION: ICD-10-CM

## 2024-10-15 DIAGNOSIS — E03.9 ACQUIRED HYPOTHYROIDISM: Primary | ICD-10-CM

## 2024-10-15 NOTE — TELEPHONE ENCOUNTER
Liver enzymes last checked July 27 and normal but ordered updated hepatic profile.     BMP and CBC done sept 18 but iron level and liver function not done.  I also added tsh (last checked July 28 and elevated at 6.81.  Last lipid panel done may 28 and at goal.  I added lipid panel.  Also due appt in January please.

## 2024-10-16 NOTE — TELEPHONE ENCOUNTER
Inf daughter, she states that she doesn't want to get him out of the house in Jan or at all in the winter due to all the illnesses and it being cold.  She is trying to get everything taken care of now.  Do you want to do a telehealth on him in Jan?

## 2024-10-21 ENCOUNTER — READMISSION MANAGEMENT (OUTPATIENT)
Dept: CALL CENTER | Facility: HOSPITAL | Age: 89
End: 2024-10-21
Payer: MEDICARE

## 2024-10-21 NOTE — OUTREACH NOTE
Prep Survey      Flowsheet Row Responses   [facility] facility patient discharged from? Non-BH   Is LACE score < 7 ? Non-BH Discharge   Eligibility [facility]   Date of Admission 10/18/24   Date of Discharge 10/21/24   Discharge Disposition Home-Health Care [facility]   Discharge diagnosis Stercoral colitis (Primary Dx),  Community acquired pneumonia of right lower lobe of lung,  Fecal impaction in rectum,  Infrarenal abdominal aortic aneurysm (AAA) without rupture,  Benign prostatic hyperplasia, unspecified whether lower urinary tract symptoms present,  Hypothyroidism, unspecified type   Does the patient have one of the following disease processes/diagnoses(primary or secondary)? Pneumonia   Does the patient have Home health ordered? Yes   What is the Home health agency?  [facility]   Is there a DME ordered? No   Prep survey completed? Yes            Marylou GOEL - Registered Nurse

## 2024-10-22 ENCOUNTER — TRANSITIONAL CARE MANAGEMENT TELEPHONE ENCOUNTER (OUTPATIENT)
Dept: CALL CENTER | Facility: HOSPITAL | Age: 89
End: 2024-10-22
Payer: MEDICARE

## 2024-10-22 NOTE — OUTREACH NOTE
Call Center TCM Note      Flowsheet Row Responses   [facility] patient discharged from? Non-BH   Does the patient have one of the following disease processes/diagnoses(primary or secondary)? Pneumonia   TCM attempt successful? No   Unsuccessful attempts Attempt 1            Roxana Murray RN    10/22/2024, 08:54 EDT

## 2024-10-22 NOTE — OUTREACH NOTE
Call Center TCM Note      Flowsheet Row Responses   [facility] patient discharged from? Non-BH   Does the patient have one of the following disease processes/diagnoses(primary or secondary)? Pneumonia   TCM attempt successful? No   Unsuccessful attempts Attempt 2   Call Status Left message            Roxana Murray RN    10/22/2024, 15:28 EDT

## 2024-10-23 ENCOUNTER — TRANSITIONAL CARE MANAGEMENT TELEPHONE ENCOUNTER (OUTPATIENT)
Dept: CALL CENTER | Facility: HOSPITAL | Age: 89
End: 2024-10-23
Payer: MEDICARE

## 2024-10-23 ENCOUNTER — TELEPHONE (OUTPATIENT)
Dept: FAMILY MEDICINE CLINIC | Age: 89
End: 2024-10-23
Payer: MEDICARE

## 2024-10-23 NOTE — OUTREACH NOTE
Call Center TCM Note      Flowsheet Row Responses   [facility] patient discharged from? Non-  [[facility]]   Does the patient have one of the following disease processes/diagnoses(primary or secondary)? Other   TCM attempt successful? 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 Yes   Is the patient having any side effects they believe may be caused by any medication additions or changes? No   Does the patient have all medications ordered at discharge? Yes   Is the patient taking all medications as directed (includes completed medication regime)? Yes   Comments Wife declines to make HOSP DC FU appt at this time as she wants to wait until he gets stronger with therapy. She will call office for appt.   Does the patient have an appointment with their PCP within 7-14 days of discharge? No   Nursing Interventions Patient declined scheduling/rescheduling appointment at this time   What is the Home health agency?  [facility]   Has home health visited the patient within 72 hours of discharge? Call prior to 72 hours   Psychosocial issues? No   Did the patient receive a copy of their discharge instructions? Yes   Nursing interventions Reviewed instructions with patient   What is the patient's perception of their health status since discharge? Improving   Is the patient/caregiver able to teach back signs and symptoms related to disease process for when to call PCP? Yes   Is the patient/caregiver able to teach back signs and symptoms related to disease process for when to call 911? Yes   Is the  patient/caregiver able to teach back the hierarchy of who to call/visit for symptoms/problems? PCP, Specialist, Home health nurse, Urgent Care, ED, 911 Yes   TCM call completed? Yes   Wrap up additional comments Wife reports he is still weak and tired but Therapy will be coming to work with Pt. Wife declines to make HOSP DC FU appt at this time as she wants to wait until he gets stronger with therapy. She will call office for appt.   Call end time 0903            Johanna Fischer RN    10/23/2024, 09:03 EDT

## 2024-10-23 NOTE — TELEPHONE ENCOUNTER
Pt's daughter called to get a hospital follow up for him. He was seen at [facility] for an impaction and lower lobe pneumonia. He was discharged on 10/21/2024. The family asked that he be scheduled on November 11 because that is the day that the other daughter had off but I had no appts available on the 11th with [facility]. She then asked for the next available with latest time. I got him scheduled for November 15th at 3:15 PM. Family is aware that this is past the 14 day limit that we have for the hospital follow up and they insisted that it be this late.

## 2024-10-25 ENCOUNTER — OFFICE VISIT (OUTPATIENT)
Dept: FAMILY MEDICINE CLINIC | Age: 89
End: 2024-10-25
Payer: MEDICARE

## 2024-10-25 ENCOUNTER — TELEPHONE (OUTPATIENT)
Dept: FAMILY MEDICINE CLINIC | Age: 89
End: 2024-10-25
Payer: MEDICARE

## 2024-10-25 VITALS
DIASTOLIC BLOOD PRESSURE: 42 MMHG | BODY MASS INDEX: 22.35 KG/M2 | HEIGHT: 72 IN | TEMPERATURE: 97.8 F | WEIGHT: 165 LBS | SYSTOLIC BLOOD PRESSURE: 88 MMHG | OXYGEN SATURATION: 96 % | HEART RATE: 51 BPM

## 2024-10-25 DIAGNOSIS — R53.1 WEAKNESS: ICD-10-CM

## 2024-10-25 DIAGNOSIS — R60.0 BILATERAL LOWER EXTREMITY EDEMA: ICD-10-CM

## 2024-10-25 DIAGNOSIS — R19.7 DIARRHEA, UNSPECIFIED TYPE: Primary | ICD-10-CM

## 2024-10-25 DIAGNOSIS — I95.9 HYPOTENSION, UNSPECIFIED HYPOTENSION TYPE: ICD-10-CM

## 2024-10-25 PROCEDURE — 1160F RVW MEDS BY RX/DR IN RCRD: CPT

## 2024-10-25 PROCEDURE — 99214 OFFICE O/P EST MOD 30 MIN: CPT

## 2024-10-25 PROCEDURE — 1159F MED LIST DOCD IN RCRD: CPT

## 2024-10-25 RX ORDER — CEFDINIR 300 MG/1
300 CAPSULE ORAL 2 TIMES DAILY
COMMUNITY
Start: 2024-10-21 | End: 2024-10-26

## 2024-10-25 RX ORDER — CALCIUM POLYCARBOPHIL 625 MG
625 TABLET ORAL 2 TIMES DAILY
COMMUNITY
Start: 2024-10-20

## 2024-10-25 RX ORDER — POTASSIUM CHLORIDE 1500 MG/1
20 TABLET, EXTENDED RELEASE ORAL AS NEEDED
COMMUNITY

## 2024-10-25 RX ORDER — AMOXICILLIN 250 MG
2 CAPSULE ORAL 2 TIMES DAILY
COMMUNITY
Start: 2024-10-20

## 2024-10-25 RX ORDER — MAGNESIUM CARB/ALUMINUM HYDROX 105-160MG
296 TABLET,CHEWABLE ORAL AS NEEDED
COMMUNITY
Start: 2024-10-20

## 2024-10-25 NOTE — PROGRESS NOTES
Subjective     CHIEF COMPLAINT    Chief Complaint   Patient presents with    Diarrhea    Leg Swelling     History of Present Illness  Patient is a 89 year old male, presenting to the clinic today with his granddaughter.  He is here today with complaints of diarrhea and lower extremity edema.  Was recently admitted to [facility] last Friday through Monday for sterocoral colitis, fecal impaction and pneumonia.  He has had diarrhea since being discharged.  He is not taking the bowel regimen that was prescribed at discharge.  He feels weak.  His daughter feels like he is pale.  He was also sent home on Omnicef for his pneumonia but he has not taken that yesterday or today due to diarrhea.  He is not eating much.  Granddaughter reports stool is very dark.  He is on iron supplements and has hemorrhoids.  He was seen by home health yesterday but PT has not been out yet.  He lives with his wife and a caregiver comes during the day.  He is having about 8-9 bowel movements per day. Patient is somewhat forgetful so majority of history obtained from granddaughter.     Bilateral lower extremity edema is also a concern.  He is on Bumex as needed but they have not taken this.  Denies any fevers or shortness of breath.  He presents today in a wheelchair.      Review of Systems   Constitutional:  Negative for chills and fever.   Respiratory:  Negative for shortness of breath and wheezing.    Cardiovascular:  Positive for leg swelling. Negative for chest pain.   Gastrointestinal:  Positive for diarrhea and nausea. Negative for vomiting.        +dark stool            Past Medical History:   Diagnosis Date    Hyperlipidemia     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     8/16/2023    Personal history of transient ischemic attack (TIA), and cerebral infarction without residual deficits     8/16/2023    Prostate cancer     Squamous cell carcinoma of head and neck             Past Surgical History:   Procedure Laterality  Date    APPENDECTOMY      HERNIA REPAIR      KNEE ARTHROPLASTY, PARTIAL REPLACEMENT Bilateral     OTHER SURGICAL HISTORY  2023    Carotid artery on 2023 TCAR            Family History   Problem Relation Age of Onset    Heart disease Mother     Cancer Father             Social History     Socioeconomic History    Marital status:    Tobacco Use    Smoking status: Former     Current packs/day: 0.00     Average packs/day: 1 pack/day for 58.0 years (58.0 ttl pk-yrs)     Types: Cigarettes     Start date:      Quit date:      Years since quittin.8     Passive exposure: Past    Smokeless tobacco: Never   Vaping Use    Vaping status: Never Used   Substance and Sexual Activity    Alcohol use: Yes     Comment: occasional    Drug use: Defer    Sexual activity: Defer            Allergies   Allergen Reactions    Penicillins Unknown - Low Severity            Current Outpatient Medications on File Prior to Visit   Medication Sig Dispense Refill    acetaminophen (TYLENOL) 325 MG tablet Take 2 tablets by mouth Every 4 (Four) Hours As Needed for Mild Pain. 120 tablet 3    amiodarone (PACERONE) 200 MG tablet Take 1 tablet by mouth Daily.      aspirin 81 MG EC tablet Take 1 tablet by mouth Daily.      atorvastatin (LIPITOR) 80 MG tablet Take 1 tablet by mouth Every Night. 90 tablet 1    cefdinir (OMNICEF) 300 MG capsule Take 1 capsule by mouth 2 (Two) Times a Day.      clopidogrel (PLAVIX) 75 MG tablet Take 1 tablet by mouth Daily.      cyanocobalamin ([employer] Vitamin B-12) 1000 MCG tablet Take 1 tablet by mouth Daily. 100 tablet 2    docusate sodium 100 MG capsule Take 1 capsule by mouth 2 (Two) Times a Day. 30 capsule 0    escitalopram (Lexapro) 10 MG tablet Take 1 tablet by mouth Daily. 30 tablet 5    ferrous sulfate 324 MG tablet delayed-release Take 1 tablet by mouth Daily With Breakfast. And with 4 oz orange juice 30 tablet 3    furosemide (Lasix) 20 MG tablet Take 1 tablet by mouth Daily As Needed  "(edema). 60 tablet 1    levothyroxine (SYNTHROID, LEVOTHROID) 25 MCG tablet Take 1 tablet by mouth Every Morning. 90 tablet 1    magnesium citrate 1.745 GM/30ML solution solution Take 296 mL by mouth As Needed.      megestrol (MEGACE) 20 MG tablet Take 1 tablet by mouth Daily.      Melatonin 10 MG sublingual tablet Place 1 tablet under the tongue Every Night.      ondansetron ODT (ZOFRAN-ODT) 4 MG disintegrating tablet Take 1 tablet by mouth.      pantoprazole (PROTONIX) 40 MG EC tablet Take 1 tablet by mouth Every Morning. 90 tablet 1    polycarbophil 625 MG tablet tablet Take 1 tablet by mouth 2 (Two) Times a Day.      potassium chloride ER (K-TAB) 20 MEQ tablet controlled-release ER tablet Take 1 tablet by mouth As Needed. With bumex      sennosides-docusate (PERICOLACE) 8.6-50 MG per tablet Take 2 tablets by mouth 2 (Two) Times a Day.      terazosin (HYTRIN) 2 MG capsule Take 1 capsule by mouth Daily. 90 capsule 1    bumetanide (BUMEX) 1 MG tablet Take 1 tablet by mouth Daily.      [DISCONTINUED] potassium chloride 10 MEQ CR tablet Take 2 tablets once daily prn on  days that lasix is taken 60 tablet 1     No current facility-administered medications on file prior to visit.            BP (!) 88/42 (BP Location: Left arm) Comment: manual  Pulse 51   Temp 97.8 °F (36.6 °C) (Oral)   Ht 182.9 cm (72\")   Wt 74.8 kg (165 lb)   SpO2 96%   BMI 22.38 kg/m²          Objective     Physical Exam  Vitals and nursing note reviewed.   Constitutional:       General: He is not in acute distress.     Appearance: He is ill-appearing.   HENT:      Head: Normocephalic.   Cardiovascular:      Rate and Rhythm: Normal rate and regular rhythm.      Heart sounds: Normal heart sounds. No murmur heard.  Pulmonary:      Effort: Pulmonary effort is normal. No accessory muscle usage or respiratory distress.      Breath sounds: Normal breath sounds. No wheezing or rhonchi.   Abdominal:      General: Bowel sounds are normal. There is no " distension.      Palpations: Abdomen is soft.      Tenderness: There is no abdominal tenderness. There is no guarding or rebound.   Musculoskeletal:      Right lower le+ Edema present.      Left lower le+ Edema present.   Skin:     General: Skin is warm.      Coloration: Skin is pale.   Neurological:      General: No focal deficit present.      Mental Status: He is alert.      Comments: In wheelchair    Psychiatric:         Mood and Affect: Mood and affect normal.         Behavior: Behavior normal.       Assessment & Plan  Diarrhea, unspecified type    Hypotension, unspecified hypotension type    Weakness    Bilateral lower extremity edema         Patient is ill-appearing on exam.  He is hypotensive.  Concern for dehydration/electrolyte abnormalities given diarrhea and low PO intake.  There is also concern that the fecal impaction and colitis has not entirely resolved.  Recommend he proceed to the ER for further workup and management.  Recommend transport via EMS given blood pressure and exam, patient and family agreeable to plan.  EMS called per nursing staff.    Follow up:  No follow-ups on file.  Patient was given instructions and counseling regarding his condition or for health maintenance advice. Please see specific information pulled into the AVS if appropriate.

## 2024-10-29 ENCOUNTER — TELEPHONE (OUTPATIENT)
Dept: FAMILY MEDICINE CLINIC | Age: 89
End: 2024-10-29
Payer: MEDICARE

## 2024-10-29 NOTE — TELEPHONE ENCOUNTER
I do not prescribe megace.  I will discuss with my fellow providers here in office and see if anyone else prescribes megace.

## 2024-10-29 NOTE — TELEPHONE ENCOUNTER
Caller: Regine Last    Relationship: Emergency Contact    Best call back number: [phone]    What medication are you requesting: MEGACE     What are your current symptoms: DECREASED APPETITE    How long have you been experiencing symptoms: SINCE BEING RELEASED FROM HOSPITAL       If a prescription is needed, what is your preferred pharmacy and phone number: [facility] [address] [place], [place] - [address] [facility] [phone] [facility] [phone] FX     Additional notes: SINCE HAVING THE CONSTANT DIARRHEA WHICH HAS BEEN TAKEN CARE OF PATIENT NOW FEELS IT WILL RETURN EVERY TIME HE EATS AND SO HE HAS NOT BEEN EATING VERY MUCH.  HE WAS PRESCRIBED THIS MEDICATION IN THE PAST AND IT HELPED HIM TO REGAIN HIS APPETITE   PLEASE CONTACT IF THERE ARE ANY QUESTIONS

## 2024-11-01 ENCOUNTER — OUTSIDE FACILITY SERVICE (OUTPATIENT)
Dept: FAMILY MEDICINE CLINIC | Age: 89
End: 2024-11-01
Payer: MEDICARE

## 2024-11-18 ENCOUNTER — READMISSION MANAGEMENT (OUTPATIENT)
Dept: CALL CENTER | Facility: HOSPITAL | Age: 89
End: 2024-11-18
Payer: MEDICARE

## 2024-11-19 NOTE — OUTREACH NOTE
Prep Survey      Flowsheet Row Responses   [facility] patient discharged from? Non-[facility]   Is LACE score < 7 ? Non-BH Discharge   Eligibility Not Eligible   What are the reasons patient is not eligible? [facility]   Does the patient have one of the following disease processes/diagnoses(primary or secondary)? Other   Prep survey completed? Yes            Diana Srivastava Registered Nurse

## 2024-11-29 DIAGNOSIS — D64.9 ANEMIA, UNSPECIFIED TYPE: ICD-10-CM

## 2024-12-02 RX ORDER — FERROUS SULFATE 324(65)MG
TABLET, DELAYED RELEASE (ENTERIC COATED) ORAL
Qty: 90 TABLET | Refills: 0 | Status: SHIPPED | OUTPATIENT
Start: 2024-12-02

## 2025-01-17 ENCOUNTER — TELEPHONE (OUTPATIENT)
Dept: NEUROLOGY | Facility: CLINIC | Age: 89
End: 2025-01-17

## 2025-01-17 NOTE — TELEPHONE ENCOUNTER
Provider: TYRONE LI    Caller: Regine Last    Relationship to Patient: Emergency Contact    Pharmacy: [facility]     Phone Number: [phone]    Reason for Call: PATIENT BROKE HIS HIP IN NOV AND WAS SENT TO REHAB FOR A FEW MONTHS AFTER THAT. 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 HE IS DEPRESSED AND CRIES ALL THE TIME. HE HAS LOST CONTROL OF HIS BLADDER AND BOWELS, CAN'T WALK ALONE, AND HAS LOST ABOUT 30 LBS SINCE NOV.    When was the patient last seen: 9/3/24    When did it start: DEC    Characteristics of symptom/severity: 8-9 OUT OF 10    Timing- Is it constant or intermittent: CONSTANT    What makes it worse: FATIGUE AND UNABLE TO COMPLETE THERAPY    What makes it better: CONSOLING HIM AND TALKING TO HIM    What therapies/medications have you tried: LEXAPRO USED TO HELP BUT NOT SINCE HE STARTED TAKING AMIODARONE. THEY WERE AFRAID IT WOULD INTERFERE WITH HIS HEART

## 2025-01-20 ENCOUNTER — TELEPHONE (OUTPATIENT)
Dept: FAMILY MEDICINE CLINIC | Age: 89
End: 2025-01-20
Payer: MEDICARE

## 2025-01-20 NOTE — TELEPHONE ENCOUNTER
Caller: Sonal Espana    Relationship to patient: Emergency Contact    Best call back number: [phone]     New or established patient?  [] New  [x] Established    Date of discharge: 1.16.2025    Facility discharged from: [facility]    Diagnosis/Symptoms: BROKEN HIP    Length of stay (If applicable): NOVEMBER 2025    Additional Details: PATIENT SCHEDULED FOLLOW UP APPOINTMENT FOR 1.22.2025.    IS THERE ANY PLACE THAT CAN HELP TRANSPORT PATIENT TO UPCOMING APPOINTMENT AS PATIENT IS CURRENTLY WHEELCHAIR BOUND.        CONTACT CALLER TO MELISSA..

## 2025-01-21 ENCOUNTER — TELEPHONE (OUTPATIENT)
Dept: FAMILY MEDICINE CLINIC | Age: 89
End: 2025-01-21
Payer: MEDICARE

## 2025-01-21 NOTE — TELEPHONE ENCOUNTER
Hub staff attempted to follow warm transfer process and was unsuccessful     Caller: MIKE MATOS    Relationship to patient:     Best call back number: [phone]     Patient is needing:  PATIENTS NURSE STATES THAT HE STARTED CARE TODAY AND WANT TO TALK ABOUT ORDERS AND HIS ANXIETY AND DEPRESSION

## 2025-01-22 ENCOUNTER — OFFICE VISIT (OUTPATIENT)
Dept: FAMILY MEDICINE CLINIC | Age: 89
End: 2025-01-22
Payer: MEDICARE

## 2025-01-22 ENCOUNTER — LAB (OUTPATIENT)
Dept: LAB | Facility: HOSPITAL | Age: 89
End: 2025-01-22
Payer: MEDICARE

## 2025-01-22 VITALS
BODY MASS INDEX: 22.38 KG/M2 | HEIGHT: 72 IN | OXYGEN SATURATION: 98 % | SYSTOLIC BLOOD PRESSURE: 94 MMHG | TEMPERATURE: 97.6 F | DIASTOLIC BLOOD PRESSURE: 54 MMHG | HEART RATE: 71 BPM

## 2025-01-22 DIAGNOSIS — Z79.899 ENCOUNTER FOR MONITORING STATIN THERAPY: ICD-10-CM

## 2025-01-22 DIAGNOSIS — N40.1 BENIGN PROSTATIC HYPERPLASIA WITH LOWER URINARY TRACT SYMPTOMS, SYMPTOM DETAILS UNSPECIFIED: ICD-10-CM

## 2025-01-22 DIAGNOSIS — R32 URINARY INCONTINENCE, UNSPECIFIED TYPE: ICD-10-CM

## 2025-01-22 DIAGNOSIS — Z86.73 HISTORY OF STROKE: ICD-10-CM

## 2025-01-22 DIAGNOSIS — E03.9 ACQUIRED HYPOTHYROIDISM: ICD-10-CM

## 2025-01-22 DIAGNOSIS — R53.1 GENERALIZED WEAKNESS: ICD-10-CM

## 2025-01-22 DIAGNOSIS — Z86.73 HISTORY OF TIA (TRANSIENT ISCHEMIC ATTACK): ICD-10-CM

## 2025-01-22 DIAGNOSIS — Z95.818 PRESENCE OF WATCHMAN LEFT ATRIAL APPENDAGE CLOSURE DEVICE: ICD-10-CM

## 2025-01-22 DIAGNOSIS — F32.89 OTHER DEPRESSION: ICD-10-CM

## 2025-01-22 DIAGNOSIS — Z51.81 ENCOUNTER FOR MONITORING STATIN THERAPY: ICD-10-CM

## 2025-01-22 DIAGNOSIS — I48.91 ATRIAL FIBRILLATION, UNSPECIFIED TYPE: ICD-10-CM

## 2025-01-22 DIAGNOSIS — I63.89 OTHER CEREBRAL INFARCTION: ICD-10-CM

## 2025-01-22 DIAGNOSIS — Z86.73 HISTORY OF CVA (CEREBROVASCULAR ACCIDENT): ICD-10-CM

## 2025-01-22 DIAGNOSIS — I69.319 UNSPECIFIED SYMPTOMS AND SIGNS INVOLVING COGNITIVE FUNCTIONS FOLLOWING CEREBRAL INFARCTION: ICD-10-CM

## 2025-01-22 DIAGNOSIS — Z87.81 HISTORY OF FRACTURE OF LEFT HIP: ICD-10-CM

## 2025-01-22 DIAGNOSIS — F03.B3 MODERATE DEMENTIA WITH MOOD DISTURBANCE, UNSPECIFIED DEMENTIA TYPE: ICD-10-CM

## 2025-01-22 DIAGNOSIS — D50.9 IRON DEFICIENCY ANEMIA, UNSPECIFIED IRON DEFICIENCY ANEMIA TYPE: ICD-10-CM

## 2025-01-22 DIAGNOSIS — G47.00 INSOMNIA, UNSPECIFIED TYPE: ICD-10-CM

## 2025-01-22 DIAGNOSIS — K21.9 GASTROESOPHAGEAL REFLUX DISEASE WITHOUT ESOPHAGITIS: ICD-10-CM

## 2025-01-22 DIAGNOSIS — D50.9 IRON DEFICIENCY ANEMIA, UNSPECIFIED IRON DEFICIENCY ANEMIA TYPE: Primary | ICD-10-CM

## 2025-01-22 PROBLEM — F32.A DEPRESSION: Status: ACTIVE | Noted: 2025-01-22

## 2025-01-22 PROBLEM — S72.009A CLOSED FRACTURE OF HIP: Chronic | Status: ACTIVE | Noted: 2024-11-11

## 2025-01-22 PROBLEM — R60.0 LOCALIZED EDEMA: Status: RESOLVED | Noted: 2024-02-23 | Resolved: 2025-01-22

## 2025-01-22 PROBLEM — R41.82 ALTERED MENTAL STATUS: Status: RESOLVED | Noted: 2024-07-27 | Resolved: 2025-01-22

## 2025-01-22 PROBLEM — I71.43 INFRARENAL ABDOMINAL AORTIC ANEURYSM (AAA) WITHOUT RUPTURE: Status: ACTIVE | Noted: 2024-10-19

## 2025-01-22 PROBLEM — F03.90 DEMENTIA WITHOUT BEHAVIORAL DISTURBANCE: Status: RESOLVED | Noted: 2024-07-28 | Resolved: 2025-01-22

## 2025-01-22 PROCEDURE — 1160F RVW MEDS BY RX/DR IN RCRD: CPT | Performed by: NURSE PRACTITIONER

## 2025-01-22 PROCEDURE — G2211 COMPLEX E/M VISIT ADD ON: HCPCS | Performed by: NURSE PRACTITIONER

## 2025-01-22 PROCEDURE — 99214 OFFICE O/P EST MOD 30 MIN: CPT | Performed by: NURSE PRACTITIONER

## 2025-01-22 PROCEDURE — 1159F MED LIST DOCD IN RCRD: CPT | Performed by: NURSE PRACTITIONER

## 2025-01-22 RX ORDER — VENLAFAXINE HYDROCHLORIDE 37.5 MG/1
37.5 CAPSULE, EXTENDED RELEASE ORAL DAILY
Qty: 30 CAPSULE | Refills: 2 | Status: SHIPPED | OUTPATIENT
Start: 2025-01-22

## 2025-01-22 RX ORDER — HYDROXYZINE HYDROCHLORIDE 10 MG/1
TABLET, FILM COATED ORAL
Qty: 30 TABLET | Refills: 2 | Status: SHIPPED | OUTPATIENT
Start: 2025-01-22

## 2025-01-22 RX ORDER — LEVOTHYROXINE SODIUM 25 UG/1
25 TABLET ORAL
Qty: 90 TABLET | Refills: 1 | Status: SHIPPED | OUTPATIENT
Start: 2025-01-22 | End: 2025-01-31 | Stop reason: DRUGHIGH

## 2025-01-22 RX ORDER — TAMSULOSIN HYDROCHLORIDE 0.4 MG/1
0.4 CAPSULE ORAL DAILY
Qty: 90 CAPSULE | Refills: 1 | Status: CANCELLED | OUTPATIENT
Start: 2025-01-22

## 2025-01-22 RX ORDER — ISOSORBIDE MONONITRATE 30 MG/1
30 TABLET, EXTENDED RELEASE ORAL
COMMUNITY
Start: 2024-11-19

## 2025-01-22 RX ORDER — TERAZOSIN 2 MG/1
2 CAPSULE ORAL DAILY
Qty: 90 CAPSULE | Refills: 1 | Status: CANCELLED | OUTPATIENT
Start: 2025-01-22

## 2025-01-22 RX ORDER — TAMSULOSIN HYDROCHLORIDE 0.4 MG/1
0.4 CAPSULE ORAL DAILY
COMMUNITY
Start: 2024-11-19

## 2025-01-22 RX ORDER — AMIODARONE HYDROCHLORIDE 100 MG/1
100 TABLET ORAL DAILY
COMMUNITY
Start: 2024-11-19

## 2025-01-22 RX ORDER — ATORVASTATIN CALCIUM 80 MG/1
80 TABLET, FILM COATED ORAL NIGHTLY
Qty: 90 TABLET | Refills: 1 | Status: SHIPPED | OUTPATIENT
Start: 2025-01-22

## 2025-01-22 RX ORDER — MIDODRINE HYDROCHLORIDE 10 MG/1
TABLET ORAL
COMMUNITY
Start: 2025-01-15

## 2025-01-22 RX ORDER — PANTOPRAZOLE SODIUM 40 MG/1
40 TABLET, DELAYED RELEASE ORAL
Qty: 90 TABLET | Refills: 1 | Status: SHIPPED | OUTPATIENT
Start: 2025-01-22

## 2025-01-22 NOTE — PROGRESS NOTES
Chief Complaint  Transitional Care Management ([facility] inpt f/u from 11/11-11/18 for Intertrochanteric fracture of left hip patient [facility] )    Subjective          Magan Lubin presents to [facility]     Patient is an 89-year-old male is here today with his daughter, Regine.  Fell on November 11 and fractured his left hip.  Inpatient at [facility] from November 11 through 18 for fracture repair.  [facility] and [facility] on January 16.  Home health with [facility] was ordered by orthopedist, Dr. Ochoa.  Nurse came for evaluation yesterday and he will start occupational therapy and then later physical therapy (therapist on vacation).   From an orthopedic standpoint he is healed as expected but he is having mobility issues and worsening dementia for which he sees neurology.  It is not clear how much the rehab facility worked with him for physical therapy.  He did have COVID less than 4 weeks ago and was in isolation for 10 days at that time.  Denies any shortness of breath, cough, or any persisting symptoms that could be related to ongoing COVID symptoms.  It has been a difficult last few months for patient and showed some gradual weight loss of almost 30 pounds.  He is often tearful and he is also had a Watchman procedure inserted per cardiology so he had to stop Lexapro due to being on amiodarone.  There was concern about prolonged QT interval with Lexapro while he was on amiodarone.  Regine states venlafaxine was advised as an alternative as it would not be as likely to prolong QT interval.  Melatonin at bedtime is not as effective as it was previously and he did sleep better with CBD oil last night.  Something to help with sleep is requested in addition to treatment for depression.  They have been in contact with neurology to see if they can get a sooner appointment for follow-up.  He is on iron supplement but it  "tends to be constipating.  Daughter plans to use changed to slow release iron.  Hemoglobin was 8.2 on discharge from [facility] .  On January 3 hemoglobin was 9.2.  He has had 1 nosebleed since January 3 but no other unexplained bleeding or bruising.  Nosebleed was brief.    History of stroke and  remains on atorvastatin 80 mg at bedtime.  He has never had high cholesterol levels historically.    For hypothyroidism he takes levothyroxine 25 mcg daily.  No recent thyroid level has been checked.    History of prostate cancer and BPH had catheter while at rehab facility.  [facility]rew [facility]iz [facility] .  Follow up advised in 4 to 6 weeks.  Patricia states he has been taking tamsulosin 0.4 mg two tablets daily.      Not currently scheduled for follow up with cardiology or urology.     Patircia or a sister stays with patient and his wife at night.  Caretaker comes to home during day.     Regarding insomnia he did not tolerate Remeron previously.  Daughter wants him to continue taking megestrol for appetite stimulant.  I do not prescribe megestrol.  Whether to continue this or not will be up to neurology.  He was discharged from the rehab facility with a 30-day supply that will  mid February.     Objective   Vital Signs:   Vitals:    25 1047   BP: 94/54   BP Location: Left arm   Patient Position: Sitting   Cuff Size: Adult   Pulse: 71   Temp: 97.6 °F (36.4 °C)   TempSrc: Temporal   SpO2: 98%   Weight: Comment: unable to stand   Height: 182.9 cm (72\")       Wt Readings from Last 3 Encounters:   10/25/24 74.8 kg (165 lb)   24 75 kg (165 lb 6.4 oz)   24 76.5 kg (168 lb 9.6 oz)      BP Readings from Last 3 Encounters:   25 94/54   10/25/24 (!) 88/42   24 120/70       Body mass index is 22.38 kg/m².    BMI is within normal parameters. No other follow-up for BMI required.       Physical Exam  Vitals reviewed.   Constitutional:       General: He is not in acute distress.     " Appearance: Normal appearance. He is well-developed. He is not ill-appearing.      Comments: thin   Neck:      Thyroid: No thyromegaly.      Vascular: No carotid bruit.   Cardiovascular:      Rate and Rhythm: Normal rate and regular rhythm.      Pulses:           Posterior tibial pulses are 2+ on the right side and 2+ on the left side.      Heart sounds: Normal heart sounds.   Pulmonary:      Effort: Pulmonary effort is normal.      Breath sounds: Normal breath sounds.   Musculoskeletal:      Right lower leg: No edema.      Left lower leg: No edema.      Comments: Sitting in wheelchair   Skin:     General: Skin is warm and dry.   Neurological:      General: No focal deficit present.      Mental Status: He is alert.   Psychiatric:         Attention and Perception: Attention normal.         Mood and Affect: Mood and affect normal.         Behavior: Behavior normal.           Current Outpatient Medications:     amiodarone (PACERONE) 100 MG tablet, Take 1 tablet by mouth Daily., Disp: , Rfl:     aspirin 81 MG EC tablet, Take 1 tablet by mouth Daily., Disp: , Rfl:     atorvastatin (LIPITOR) 80 MG tablet, Take 1 tablet by mouth Every Night., Disp: 90 tablet, Rfl: 1    clopidogrel (PLAVIX) 75 MG tablet, Take 1 tablet by mouth Daily., Disp: , Rfl:     cyanocobalamin ([employer] Vitamin B-12) 1000 MCG tablet, Take 1 tablet by mouth Daily., Disp: 100 tablet, Rfl: 2    ferrous sulfate 324 (65 Fe) MG tablet delayed-release EC tablet, TAKE 1 TABLET BY MOUTH ONCE DAILY WITH  BREAKFAST  AND  WITH  4OZ  OF  ORANGE  JUICE, Disp: 90 tablet, Rfl: 0    isosorbide mononitrate (IMDUR) 30 MG 24 hr tablet, Take 1 tablet by mouth., Disp: , Rfl:     levothyroxine (SYNTHROID, LEVOTHROID) 25 MCG tablet, Take 1 tablet by mouth Every Morning., Disp: 90 tablet, Rfl: 1    megestrol (MEGACE) 20 MG tablet, Take 1 tablet by mouth Daily., Disp: , Rfl:     midodrine (PROAMATINE) 10 MG tablet, , Disp: , Rfl:     pantoprazole (PROTONIX) 40 MG EC tablet, Take 1  "tablet by mouth Every Morning., Disp: 90 tablet, Rfl: 1    sennosides-docusate (PERICOLACE) 8.6-50 MG per tablet, Take 2 tablets by mouth 2 (Two) Times a Day., Disp: , Rfl:     tamsulosin (FLOMAX) 0.4 MG capsule 24 hr capsule, Take 1 capsule by mouth Daily., Disp: , Rfl:     bumetanide (BUMEX) 1 MG tablet, Take 1 tablet by mouth Daily., Disp: , Rfl:     hydrOXYzine (ATARAX) 10 MG tablet, Take one tablet At bedtime prn, Disp: 30 tablet, Rfl: 2    venlafaxine XR (Effexor XR) 37.5 MG 24 hr capsule, Take 1 capsule by mouth Daily., Disp: 30 capsule, Rfl: 2   Past Medical History:   Diagnosis Date    Hyperlipidemia     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     8/16/2023    Personal history of transient ischemic attack (TIA), and cerebral infarction without residual deficits     8/16/2023    Prostate cancer     Squamous cell carcinoma of head and neck      Allergies   Allergen Reactions    Penicillins Unknown - Low Severity               Result Review :     Common labs          7/29/2024    05:52 8/6/2024    15:08 1/28/2025    10:49   Common Labs   Glucose 95      BUN 15      Creatinine 0.78      Sodium 139      Potassium 3.6      Chloride 107      Calcium 8.6      WBC 4.91  5.39  8.29    Hemoglobin 7.6  8.9  10.3    Hematocrit 22.8  27.3  31.9    Platelets 197  265  298         XR CHEST 1 VW    Result Date: 11/17/2024  The lungs are well expanded with chronic interstitial change. There is no acute cardiopulmonary process. Images reviewed, interpreted, dictated and electronically signed by Umberto Stoddard MD Voice transcription technology (Power Scribe) is used for the dictation of this note and \"sound-alike\" words might be erroneously placed despite reviewing this note for accuracy. Errors in dictation may reflect use of voice recognition software and not all errors in transcription may have been detected prior to signing.    XR ABDOMEN 1 VW    Result Date: 11/17/2024  Nonspecific bowel gas pattern without " significant fecal impaction.    XR HIP W OR WO PELVIS 2-3 VIEW RIGHT    Result Date: 11/15/2024  Post-ORIF changes.    XR CHEST 1 VW    Result Date: 11/13/2024  Interval development of bibasilar opacities. Images reviewed, interpreted, and dictated by Dr. Gregory Roland. Transcribed by Suresh Nieves PA-C    XR HIP W OR WO PELVIS 2-3 VIEW RIGHT    Result Date: 11/12/2024  Acute left femur intertrochanteric fracture as above. Images personally reviewed, interpreted and dictated by DAVID Lorenzo.          CT cervical spine without contrast    Result Date: 11/11/2024  No acute abnormality. CT OF THE PELVIS HISTORY: As above. TECHNIQUE: Thin-section axial images were obtained without contrast. Coronal and sagittal reconstructed images were obtained.  This study was performed with techniques to keep radiation doses as low as reasonably achievable, (ALARA). Individualized dose reduction techniques using automated exposure control or adjustment of mA and/or kV according to the patient size were employed. COMPARISON: None. FINDINGS:  There is a comminuted, displaced and angulated intertrochanteric fracture of the left femur the bony pelvis is intact as is the right hip. IMPRESSION: Intertrochanteric fracture of the left femur. HEAD CT HISTORY: As above TECHNIQUE: Multiple axial CT images were performed from the foramen magnum to the vertex without contrast. This study was performed with techniques to keep radiation doses as low as reasonably achievable, (ALARA). Individualized dose reduction techniques using automated exposure control or adjustment of mA and/or kV according to the patient size were employed. COMPARISON: None. FINDINGS: The ventricles are dilated, proportionate to the degree of generalized atrophy. Periventricular and deep white matter low attenuation areas are consistent with chronic small vessel ischemia. There is no mass or shift of midline structures. There is no intracranial hemorrhage.  No  significant sinus or osseous abnormality is seen. IMPRESSION: No fracture or acute intracranial abnormality. CT CERVICAL SPINE HISTORY: As above TECHNIQUE: Thin-section axial CT with sagittal and coronal reconstructions. This study was performed with techniques to keep radiation doses as low as reasonably achievable, (ALARA). Individualized dose reduction techniques using automated exposure control or adjustment of mA and/or kV according to the patient size were employed. COMPARISON: None. FINDINGS:  The prevertebral soft tissues are normal. There is normal alignment.. There is no fracture. There is severe mid cervical degenerative disease. IMPRESSION: No acute abnormality. Images reviewed, interpreted and dictated by Dr. Kenji Friedman MD    CT LUMBAR SPINE WO CONTRAST    Result Date: 11/11/2024  No acute abnormality. CT OF THE PELVIS HISTORY: As above. TECHNIQUE: Thin-section axial images were obtained without contrast. Coronal and sagittal reconstructed images were obtained.  This study was performed with techniques to keep radiation doses as low as reasonably achievable, (ALARA). Individualized dose reduction techniques using automated exposure control or adjustment of mA and/or kV according to the patient size were employed. COMPARISON: None. FINDINGS:  There is a comminuted, displaced and angulated intertrochanteric fracture of the left femur the bony pelvis is intact as is the right hip. IMPRESSION: Intertrochanteric fracture of the left femur. HEAD CT HISTORY: As above TECHNIQUE: Multiple axial CT images were performed from the foramen magnum to the vertex without contrast. This study was performed with techniques to keep radiation doses as low as reasonably achievable, (ALARA). Individualized dose reduction techniques using automated exposure control or adjustment of mA and/or kV according to the patient size were employed. COMPARISON: None. FINDINGS: The ventricles are dilated, proportionate to the degree of  generalized atrophy. Periventricular and deep white matter low attenuation areas are consistent with chronic small vessel ischemia. There is no mass or shift of midline structures. There is no intracranial hemorrhage.  No significant sinus or osseous abnormality is seen. IMPRESSION: No fracture or acute intracranial abnormality. CT CERVICAL SPINE HISTORY: As above TECHNIQUE: Thin-section axial CT with sagittal and coronal reconstructions. This study was performed with techniques to keep radiation doses as low as reasonably achievable, (ALARA). Individualized dose reduction techniques using automated exposure control or adjustment of mA and/or kV according to the patient size were employed. COMPARISON: None. FINDINGS:  The prevertebral soft tissues are normal. There is normal alignment.. There is no fracture. There is severe mid cervical degenerative disease. IMPRESSION: No acute abnormality. Images reviewed, interpreted and dictated by Dr. Kenji Friedman MD    CT ABDOMEN PELVIS WO CONTRAST    Result Date: 11/11/2024  No acute abnormality. CT OF THE PELVIS HISTORY: As above. TECHNIQUE: Thin-section axial images were obtained without contrast. Coronal and sagittal reconstructed images were obtained.  This study was performed with techniques to keep radiation doses as low as reasonably achievable, (ALARA). Individualized dose reduction techniques using automated exposure control or adjustment of mA and/or kV according to the patient size were employed. COMPARISON: None. FINDINGS:  There is a comminuted, displaced and angulated intertrochanteric fracture of the left femur the bony pelvis is intact as is the right hip. IMPRESSION: Intertrochanteric fracture of the left femur. HEAD CT HISTORY: As above TECHNIQUE: Multiple axial CT images were performed from the foramen magnum to the vertex without contrast. This study was performed with techniques to keep radiation doses as low as reasonably achievable, (ALARA).  Individualized dose reduction techniques using automated exposure control or adjustment of mA and/or kV according to the patient size were employed. COMPARISON: None. FINDINGS: The ventricles are dilated, proportionate to the degree of generalized atrophy. Periventricular and deep white matter low attenuation areas are consistent with chronic small vessel ischemia. There is no mass or shift of midline structures. There is no intracranial hemorrhage.  No significant sinus or osseous abnormality is seen. IMPRESSION: No fracture or acute intracranial abnormality. CT CERVICAL SPINE HISTORY: As above TECHNIQUE: Thin-section axial CT with sagittal and coronal reconstructions. This study was performed with techniques to keep radiation doses as low as reasonably achievable, (ALARA). Individualized dose reduction techniques using automated exposure control or adjustment of mA and/or kV according to the patient size were employed. COMPARISON: None. FINDINGS:  The prevertebral soft tissues are normal. There is normal alignment.. There is no fracture. There is severe mid cervical degenerative disease. IMPRESSION: No acute abnormality. Images reviewed, interpreted and dictated by Dr. Kenji Friedman MD    CT HEAD WO CONTRAST    Result Date: 11/11/2024  No acute abnormality. CT OF THE PELVIS HISTORY: As above. TECHNIQUE: Thin-section axial images were obtained without contrast. Coronal and sagittal reconstructed images were obtained.  This study was performed with techniques to keep radiation doses as low as reasonably achievable, (ALARA). Individualized dose reduction techniques using automated exposure control or adjustment of mA and/or kV according to the patient size were employed. COMPARISON: None. FINDINGS:  There is a comminuted, displaced and angulated intertrochanteric fracture of the left femur the bony pelvis is intact as is the right hip. IMPRESSION: Intertrochanteric fracture of the left femur. HEAD CT HISTORY: As above  TECHNIQUE: Multiple axial CT images were performed from the foramen magnum to the vertex without contrast. This study was performed with techniques to keep radiation doses as low as reasonably achievable, (ALARA). Individualized dose reduction techniques using automated exposure control or adjustment of mA and/or kV according to the patient size were employed. COMPARISON: None. FINDINGS: The ventricles are dilated, proportionate to the degree of generalized atrophy. Periventricular and deep white matter low attenuation areas are consistent with chronic small vessel ischemia. There is no mass or shift of midline structures. There is no intracranial hemorrhage.  No significant sinus or osseous abnormality is seen. IMPRESSION: No fracture or acute intracranial abnormality. CT CERVICAL SPINE HISTORY: As above TECHNIQUE: Thin-section axial CT with sagittal and coronal reconstructions. This study was performed with techniques to keep radiation doses as low as reasonably achievable, (ALARA). Individualized dose reduction techniques using automated exposure control or adjustment of mA and/or kV according to the patient size were employed. COMPARISON: None. FINDINGS:  The prevertebral soft tissues are normal. There is normal alignment.. There is no fracture. There is severe mid cervical degenerative disease. IMPRESSION: No acute abnormality. Images reviewed, interpreted and dictated by Dr. Kenji Friedman MD    CT ABDOMEN PELVIS W CONTRAST    Result Date: 10/30/2024  No evidence of bowel obstruction. No visualized changes of pancreatitis. Colonic wall thickening greatest in the right colon most consistent with colitis. Images reviewed, interpreted, and dictated by Dr. Gregory Roland. Transcribed by Elian Rodriguez PA-C.    XR ABDOMEN FLAT AND UPRIGHT    Result Date: 10/25/2024  Nonspecific bowel gas pattern. Patchy right lower lobe opacity may be infectious or neoplastic. 8 week follow-up two-view chest x-ray is recommended.  Images reviewed, interpreted, and dictated by Dr. PORFIRIO Donald. Transcribed by VEDA Darnell(R).             Social History     Tobacco Use   Smoking Status Former    Current packs/day: 0.00    Average packs/day: 1 pack/day for 58.0 years (58.0 ttl pk-yrs)    Types: Cigarettes    Start date:     Quit date:     Years since quittin.0    Passive exposure: Past   Smokeless Tobacco Never           Assessment and Plan    Diagnoses and all orders for this visit:    1. Iron deficiency anemia, unspecified iron deficiency anemia type (Primary)  Assessment & Plan:  Further treatment recommendations pending lab results.  I do not prescribe Megace as an appetite stimulant.  If that is to be continued that would be up to one of his specialist discretion.  I also defer long-term treatment of depression to neurology as he does have dementia.  He must continue to see cardiology.  Also must be careful with medications use for prostate as it can interfere with blood pressure treatment and management.  I am refilling atorvastatin, levothyroxine, and pantoprazole long-term.  Other medications at the discretion of cardiology and neurology.    Orders:  -     CBC w AUTO Differential; Future  -     Iron and TIBC; Future  -     Vitamin B12; Future  -     Folate; Future    2. Acquired hypothyroidism  -     TSH Rfx On Abnormal To Free T4; Future  -     levothyroxine (SYNTHROID, LEVOTHROID) 25 MCG tablet; Take 1 tablet by mouth Every Morning.  Dispense: 90 tablet; Refill: 1    3. Urinary incontinence, unspecified type  Assessment & Plan:  He had urinary retention in December and [facility].  Needs to keep all appointments local if at all possible.  Medicines for the prostate can also lower blood pressure which could contribute to dizziness and increases falls risk.  Regine is concerning he is urinating very frequently and has no concerns for obstruction at this time.  I would hesitate to give him more than  tamsulosin 0.4 mg once a daily and I definitely would not give terazosin as it is a duplication of therapy and could lead to hypotension.  I am not a urologist however and we can arrange for him to see a local urologist for further management and evaluation.    Orders:  -     Urinalysis With Culture If Indicated -; Future  -     PSA DIAGNOSTIC ONLY; Future    4. Generalized weakness  -     Comprehensive metabolic panel; Future  -     Ambulatory Referral to Cardiology    5. Gastroesophageal reflux disease without esophagitis  -     pantoprazole (PROTONIX) 40 MG EC tablet; Take 1 tablet by mouth Every Morning.  Dispense: 90 tablet; Refill: 1    6. History of CVA (cerebrovascular accident)  -     atorvastatin (LIPITOR) 80 MG tablet; Take 1 tablet by mouth Every Night.  Dispense: 90 tablet; Refill: 1  -     Cancel: Lipid panel; Future  -     Ambulatory Referral to Cardiology    7. Benign prostatic hyperplasia with lower urinary tract symptoms, symptom details unspecified  -     PSA DIAGNOSTIC ONLY; Future    8. Insomnia, unspecified type  -     hydrOXYzine (ATARAX) 10 MG tablet; Take one tablet At bedtime prn  Dispense: 30 tablet; Refill: 2    9. Encounter for monitoring statin therapy  -     Cancel: Lipid panel; Future    10. Other depression  Assessment & Plan:  Must be careful with any potential QT prolongation with any antidepressant.  Must watch very closely as medications help mood could also worsen symptoms rarely.  Biggest concerns today are his immobility, dementia, and depression.  We will get some labs to make sure there is no underlying contributors that need to be addressed.    Orders:  -     venlafaxine XR (Effexor XR) 37.5 MG 24 hr capsule; Take 1 capsule by mouth Daily.  Dispense: 30 capsule; Refill: 2  -     Ambulatory Referral to Cardiology    11. Unspecified symptoms and signs involving cognitive functions following cerebral infarction  -     Cancel: Lipid panel; Future    12. Moderate dementia with  mood disturbance, unspecified dementia type  -     Ambulatory Referral to Cardiology    13. Presence of Watchman left atrial appendage closure device  -     Ambulatory Referral to Social Care Services (Amb Case Mgmt)    14. History of fracture of left hip    15. Atrial fibrillation, unspecified type  -     Ambulatory Referral to Social Care Services (Amb Case Mgmt)        Follow Up    Return in about 3 months (around 4/22/2025).  Patient was given instructions and counseling regarding his condition or for health maintenance advice. Please see specific information pulled into the AVS if appropriate.

## 2025-01-22 NOTE — TELEPHONE ENCOUNTER
Spoke to Syeda and advised he has been prescribed Hydroxyzine and Venlafaxine.  She says daughter states that lab could not get his blood today.  2 people stuck him and were not able to get it.  She will try to get it when she sees him next time, but that may not be until next week.  For some reason the lipid order is in twice.  Can you remove one of them please?

## 2025-01-23 ENCOUNTER — REFERRAL TRIAGE (OUTPATIENT)
Age: 89
End: 2025-01-23
Payer: MEDICARE

## 2025-01-23 NOTE — ASSESSMENT & PLAN NOTE
He had urinary retention in December and [facility].  Needs to keep all appointments local if at all possible.  Medicines for the prostate can also lower blood pressure which could contribute to dizziness and increases falls risk.  Regine is concerning he is urinating very frequently and has no concerns for obstruction at this time.  I would hesitate to give him more than tamsulosin 0.4 mg once a daily and I definitely would not give terazosin as it is a duplication of therapy and could lead to hypotension.  I am not a urologist however and we can arrange for him to see a local urologist for further management and evaluation.

## 2025-01-23 NOTE — TELEPHONE ENCOUNTER
Cancelled  one of the two lipid panel orders.  Reviewed [facility] visit note after patient visit yesterday.  Appears he had some significant obstructive symptoms mid December and it was recommended he follow-up in 4 to 6 weeks.  His medications for his urinary symptoms can contribute to hypotension and that is one of the reasons he takes midodrine.  Therefore we must maintain a careful balance and I recommend getting him established with a local urologist to help us monitor effectively along with cardiology.  I would also like to ask family if they would be agreeable to our nurse navigator reaching out to them in addition to the  to help manage care and assist with resources for care.

## 2025-01-23 NOTE — ASSESSMENT & PLAN NOTE
Must be careful with any potential QT prolongation with any antidepressant.  Must watch very closely as medications help mood could also worsen symptoms rarely.  Biggest concerns today are his immobility, dementia, and depression.  We will get some labs to make sure there is no underlying contributors that need to be addressed.

## 2025-01-24 ENCOUNTER — PATIENT OUTREACH (OUTPATIENT)
Age: 89
End: 2025-01-24
Payer: MEDICARE

## 2025-01-24 NOTE — OUTREACH NOTE
Social Work Assessment  Questions/Answers      Flowsheet Row Most Recent Value   Referral Source physician   Reason for Consult community resources   Preferred Language English   Advance Care Planning Reviewed no concerns identified   Decision Making Considerations patient/family ability to make health care decisions   People in Home spouse   Current Living Arrangements home   Primary Care Provided by child(babita)   Provides Primary Care For no one, unable/limited ability to care for self   Family Caregiver if Needed child(babita), adult   Quality of Family Relationships helpful   Source of Income social security   Application for Public Assistance obtained public assistance pending number          SDOH updated and reviewed with the patient during this program:  --     Disabilities: At Risk (7/27/2024)    Disabilities     Concentrating, Remembering, or Making Decisions Difficulty: yes     Doing Errands Independently Difficulty: yes      --     Employment: Not At Risk (11/12/2024)    Received from [employer]    Employment     Do you want help finding or keeping work or a job?: I do not need or want help      Financial Resource Strain: Low Risk  (11/12/2024)    Received from [employer]    Financial Resource Strain     How hard is it for you to pay for the very basics like food, housing, medical care, and heating?  Would you say it is:: Not hard at all      --     Food Insecurity: No Food Insecurity (11/12/2024)    Received from [employer]    Food Insecurity      Within the past 12 months, you worried that your food would run out before you got money to buy more.: Never true     Within the past 12 months, the food you bought just didn't last and you didn't have money to get more.: Never true      --     Housing Stability: Unknown (1/24/2025)    Housing Stability     Current Living Arrangements: home      --     Transportation Needs: No Transportation Needs (11/12/2024)     Received from [employer]    Transportation Needs     In the past 12 months, has lack of reliable transportation kept you from medical appointments, meetings, work or from getting things needed for daily living?: No      --     Utilities: Low Risk  (11/12/2024)    Received from [employer]    Utilities     In the past 12 months, has the electric, gas, oil, or water company threatened to shut off services in your home?: No      Continuing Care   [facility]    [facility] [address] [facility] [address]    Phone: [phone]    Request Status: Pending - No Request Sent    Services: Financial Assistance    Resource for: Financial Resource Strain, Utilities     Patient Outreach    MSW spoke with patient's daughter, Regine, in depth regarding LT Medicaid Waiver, VA healthcare, and VA Aid and Attendance. Patient's daughter states that they have applied for Aid and Attendance back in August and are awaiting response. MSW educated on other supports through VA Healthcare (homemaker, incontinent supplies coverage, life alert). Patient's daughter states that her mother does receive waiver services. Patient's daughter to apply for waiver for father. Patient's daughter emailed all resources ([email or web address])

## 2025-01-27 ENCOUNTER — TELEPHONE (OUTPATIENT)
Dept: FAMILY MEDICINE CLINIC | Age: 89
End: 2025-01-27
Payer: MEDICARE

## 2025-01-27 NOTE — TELEPHONE ENCOUNTER
Caller: Regine Last    Relationship: Emergency Contact    Best call back number: [phone]     What form or medical record are you requesting: MEDICAID WAIVER INFORMATION     Who is requesting this form or medical record from you: CALLER     Timeframe paperwork needed: AS SOON AS POSSIBLE     Additional notes: CALLER STATED THAT SHE WAS INFORMED 1/24/25 THAT INFORMATION ABOUT MEDICAID WAIVER WOULD BE EMAILED TO: [email or web address]

## 2025-01-28 ENCOUNTER — LAB (OUTPATIENT)
Dept: LAB | Facility: HOSPITAL | Age: 89
End: 2025-01-28
Payer: MEDICARE

## 2025-01-28 ENCOUNTER — PATIENT OUTREACH (OUTPATIENT)
Age: 89
End: 2025-01-28
Payer: MEDICARE

## 2025-01-28 ENCOUNTER — LAB REQUISITION (OUTPATIENT)
Dept: LAB | Facility: HOSPITAL | Age: 89
End: 2025-01-28
Payer: MEDICARE

## 2025-01-28 DIAGNOSIS — I63.89 OTHER CEREBRAL INFARCTION: ICD-10-CM

## 2025-01-28 DIAGNOSIS — Z86.73 HISTORY OF STROKE: ICD-10-CM

## 2025-01-28 DIAGNOSIS — R53.1 GENERALIZED WEAKNESS: ICD-10-CM

## 2025-01-28 DIAGNOSIS — E03.9 ACQUIRED HYPOTHYROIDISM: ICD-10-CM

## 2025-01-28 DIAGNOSIS — R53.1 WEAKNESS: ICD-10-CM

## 2025-01-28 DIAGNOSIS — Z86.73 PERSONAL HISTORY OF TRANSIENT ISCHEMIC ATTACK (TIA), AND CEREBRAL INFARCTION WITHOUT RESIDUAL DEFICITS: ICD-10-CM

## 2025-01-28 DIAGNOSIS — R32 UNSPECIFIED URINARY INCONTINENCE: ICD-10-CM

## 2025-01-28 DIAGNOSIS — D50.9 IRON DEFICIENCY ANEMIA, UNSPECIFIED IRON DEFICIENCY ANEMIA TYPE: ICD-10-CM

## 2025-01-28 DIAGNOSIS — Z86.73 HISTORY OF TIA (TRANSIENT ISCHEMIC ATTACK): ICD-10-CM

## 2025-01-28 DIAGNOSIS — D50.9 IRON DEFICIENCY ANEMIA, UNSPECIFIED: ICD-10-CM

## 2025-01-28 DIAGNOSIS — E03.9 HYPOTHYROIDISM, UNSPECIFIED: ICD-10-CM

## 2025-01-28 DIAGNOSIS — R32 URINARY INCONTINENCE, UNSPECIFIED TYPE: ICD-10-CM

## 2025-01-28 DIAGNOSIS — Z86.73 HISTORY OF CVA (CEREBROVASCULAR ACCIDENT): ICD-10-CM

## 2025-01-28 LAB
ALBUMIN SERPL-MCNC: 4 G/DL (ref 3.5–5.2)
ALBUMIN/GLOB SERPL: 1.3 G/DL
ALP SERPL-CCNC: 117 U/L (ref 39–117)
ALT SERPL W P-5'-P-CCNC: 79 U/L (ref 1–41)
ANION GAP SERPL CALCULATED.3IONS-SCNC: 14.6 MMOL/L (ref 5–15)
AST SERPL-CCNC: 112 U/L (ref 1–40)
BASOPHILS # BLD AUTO: 0.03 10*3/MM3 (ref 0–0.2)
BASOPHILS NFR BLD AUTO: 0.4 % (ref 0–1.5)
BILIRUB CONJ SERPL-MCNC: 0.3 MG/DL (ref 0–0.3)
BILIRUB SERPL-MCNC: 0.7 MG/DL (ref 0–1.2)
BUN SERPL-MCNC: 19 MG/DL (ref 8–23)
BUN/CREAT SERPL: 20.9 (ref 7–25)
CALCIUM SPEC-SCNC: 9.7 MG/DL (ref 8.6–10.5)
CHLORIDE SERPL-SCNC: 99 MMOL/L (ref 98–107)
CHOLEST SERPL-MCNC: 111 MG/DL (ref 0–200)
CO2 SERPL-SCNC: 23.4 MMOL/L (ref 22–29)
CREAT SERPL-MCNC: 0.91 MG/DL (ref 0.76–1.27)
DEPRECATED RDW RBC AUTO: 73.6 FL (ref 37–54)
EGFRCR SERPLBLD CKD-EPI 2021: 81.6 ML/MIN/1.73
EOSINOPHIL # BLD AUTO: 0.04 10*3/MM3 (ref 0–0.4)
EOSINOPHIL NFR BLD AUTO: 0.5 % (ref 0.3–6.2)
ERYTHROCYTE [DISTWIDTH] IN BLOOD BY AUTOMATED COUNT: 18.4 % (ref 12.3–15.4)
FOLATE SERPL-MCNC: 3.47 NG/ML (ref 4.78–24.2)
GLOBULIN UR ELPH-MCNC: 3 GM/DL
GLUCOSE SERPL-MCNC: 103 MG/DL (ref 65–99)
HCT VFR BLD AUTO: 31.9 % (ref 37.5–51)
HDLC SERPL-MCNC: 48 MG/DL (ref 40–60)
HGB BLD-MCNC: 10.3 G/DL (ref 13–17.7)
IMM GRANULOCYTES # BLD AUTO: 0.04 10*3/MM3 (ref 0–0.05)
IMM GRANULOCYTES NFR BLD AUTO: 0.5 % (ref 0–0.5)
IRON 24H UR-MRATE: 90 MCG/DL (ref 59–158)
IRON SATN MFR SERPL: 32 % (ref 20–50)
LDLC SERPL CALC-MCNC: 47 MG/DL (ref 0–100)
LDLC/HDLC SERPL: 0.99 {RATIO}
LYMPHOCYTES # BLD AUTO: 0.84 10*3/MM3 (ref 0.7–3.1)
LYMPHOCYTES NFR BLD AUTO: 10.1 % (ref 19.6–45.3)
MCH RBC QN AUTO: 34.8 PG (ref 26.6–33)
MCHC RBC AUTO-ENTMCNC: 32.3 G/DL (ref 31.5–35.7)
MCV RBC AUTO: 107.8 FL (ref 79–97)
MONOCYTES # BLD AUTO: 0.66 10*3/MM3 (ref 0.1–0.9)
MONOCYTES NFR BLD AUTO: 8 % (ref 5–12)
NEUTROPHILS NFR BLD AUTO: 6.68 10*3/MM3 (ref 1.7–7)
NEUTROPHILS NFR BLD AUTO: 80.5 % (ref 42.7–76)
PLATELET # BLD AUTO: 298 10*3/MM3 (ref 140–450)
PMV BLD AUTO: 8.6 FL (ref 6–12)
POTASSIUM SERPL-SCNC: 3.5 MMOL/L (ref 3.5–5.2)
PROT SERPL-MCNC: 7 G/DL (ref 6–8.5)
PSA SERPL-MCNC: 0.13 NG/ML (ref 0–4)
RBC # BLD AUTO: 2.96 10*6/MM3 (ref 4.14–5.8)
SODIUM SERPL-SCNC: 137 MMOL/L (ref 136–145)
T4 FREE SERPL-MCNC: 0.93 NG/DL (ref 0.92–1.68)
TIBC SERPL-MCNC: 279 MCG/DL (ref 298–536)
TRANSFERRIN SERPL-MCNC: 187 MG/DL (ref 200–360)
TRIGL SERPL-MCNC: 77 MG/DL (ref 0–150)
TSH SERPL DL<=0.05 MIU/L-ACNC: 26.05 UIU/ML (ref 0.27–4.2)
VIT B12 BLD-MCNC: 1057 PG/ML (ref 211–946)
VLDLC SERPL-MCNC: 16 MG/DL (ref 5–40)
WBC NRBC COR # BLD AUTO: 8.29 10*3/MM3 (ref 3.4–10.8)

## 2025-01-28 PROCEDURE — 84439 ASSAY OF FREE THYROXINE: CPT | Performed by: NURSE PRACTITIONER

## 2025-01-28 PROCEDURE — 85025 COMPLETE CBC W/AUTO DIFF WBC: CPT | Performed by: NURSE PRACTITIONER

## 2025-01-28 PROCEDURE — 81001 URINALYSIS AUTO W/SCOPE: CPT

## 2025-01-28 PROCEDURE — 80053 COMPREHEN METABOLIC PANEL: CPT | Performed by: NURSE PRACTITIONER

## 2025-01-28 PROCEDURE — 84153 ASSAY OF PSA TOTAL: CPT | Performed by: NURSE PRACTITIONER

## 2025-01-28 PROCEDURE — 84443 ASSAY THYROID STIM HORMONE: CPT | Performed by: NURSE PRACTITIONER

## 2025-01-28 PROCEDURE — 84466 ASSAY OF TRANSFERRIN: CPT | Performed by: NURSE PRACTITIONER

## 2025-01-28 PROCEDURE — 82746 ASSAY OF FOLIC ACID SERUM: CPT | Performed by: NURSE PRACTITIONER

## 2025-01-28 PROCEDURE — 82607 VITAMIN B-12: CPT | Performed by: NURSE PRACTITIONER

## 2025-01-28 PROCEDURE — 80061 LIPID PANEL: CPT | Performed by: NURSE PRACTITIONER

## 2025-01-28 PROCEDURE — 83540 ASSAY OF IRON: CPT | Performed by: NURSE PRACTITIONER

## 2025-01-28 PROCEDURE — 82248 BILIRUBIN DIRECT: CPT | Performed by: NURSE PRACTITIONER

## 2025-01-28 NOTE — OUTREACH NOTE
Patient Outreach    MSW had message from Gala at PCP clinic regarding daughter not receiving email. MSW resent on this day email to [email or web address] for waiver services.    Shanea GOEL -   Ambulatory Case Management    1/28/2025, 14:44 EST

## 2025-01-28 NOTE — ASSESSMENT & PLAN NOTE
Further treatment recommendations pending lab results.  I do not prescribe Megace as an appetite stimulant.  If that is to be continued that would be up to one of his specialist discretion.  I also defer long-term treatment of depression to neurology as he does have dementia.  He must continue to see cardiology.  Also must be careful with medications use for prostate as it can interfere with blood pressure treatment and management.  I am refilling atorvastatin, levothyroxine, and pantoprazole long-term.  Other medications at the discretion of cardiology and neurology.

## 2025-01-28 NOTE — TELEPHONE ENCOUNTER
Spoke to daughter, she says Shanae Kalee was going to email her info and she didn't get it.  I sent a secure chat to Shanae asking her to send it again.

## 2025-01-29 LAB
BACTERIA UR QL AUTO: ABNORMAL /HPF
BILIRUB UR QL STRIP: NEGATIVE
CLARITY UR: ABNORMAL
COD CRY URNS QL: PRESENT /HPF
COLOR UR: ABNORMAL
GLUCOSE UR STRIP-MCNC: NEGATIVE MG/DL
HGB UR QL STRIP.AUTO: ABNORMAL
HOLD SPECIMEN: NORMAL
HYALINE CASTS UR QL AUTO: ABNORMAL /LPF
KETONES UR QL STRIP: ABNORMAL
LEUKOCYTE ESTERASE UR QL STRIP.AUTO: ABNORMAL
NITRITE UR QL STRIP: NEGATIVE
PH UR STRIP.AUTO: 5.5 [PH] (ref 5–8)
PROT UR QL STRIP: ABNORMAL
RBC # UR STRIP: ABNORMAL /HPF
REF LAB TEST METHOD: ABNORMAL
SP GR UR STRIP: 1.03 (ref 1–1.03)
SQUAMOUS #/AREA URNS HPF: ABNORMAL /HPF
UROBILINOGEN UR QL STRIP: ABNORMAL
WBC # UR STRIP: ABNORMAL /HPF

## 2025-01-29 NOTE — PROGRESS NOTES
Levothyroxine not strong enough at 25 mcg per day.  Advise increase levothyroxine to 50 mcg per day.  Anemia improving.  Adequate vitamin b12.  Folic acid low.  I can prescribe folic acid supplement.  Kidney function normal.  Liver enzymes elevated.  Will forward results to cardiology and neurology.   May need to consider decreasing atorvastatin but I defer that decision to cardiology and neurology.  Is he taking ibuprofen, aleve or tylenol on a regular basis?

## 2025-01-31 DIAGNOSIS — E53.8 FOLIC ACID DEFICIENCY: Primary | ICD-10-CM

## 2025-01-31 DIAGNOSIS — R74.8 ELEVATED LIVER ENZYMES: ICD-10-CM

## 2025-01-31 DIAGNOSIS — E03.9 ACQUIRED HYPOTHYROIDISM: ICD-10-CM

## 2025-01-31 DIAGNOSIS — R94.5 ABNORMAL RESULTS OF LIVER FUNCTION STUDIES: ICD-10-CM

## 2025-01-31 RX ORDER — FOLIC ACID 0.4 MG
400 TABLET ORAL DAILY
Qty: 90 TABLET | Refills: 0 | Status: SHIPPED | OUTPATIENT
Start: 2025-01-31

## 2025-01-31 RX ORDER — LEVOTHYROXINE SODIUM 50 UG/1
50 TABLET ORAL
Qty: 90 TABLET | Refills: 0 | Status: SHIPPED | OUTPATIENT
Start: 2025-01-31

## 2025-01-31 NOTE — PROGRESS NOTES
Regarding liver function, Molly Friend neurology is okay with either decreasing atorvastatin from 80 mg a day to 40 mg a day or discontinuing atorvastatin and rechecking liver enzymes in 1 month.  I have sent levothyroxine 50 mcg daily and folic acid 400 mcg daily to [facility] pharmacy.  I would like to have the nurse navigator contact patient and family to offer assistance with resources as indicated.  Is family agreeable?  Does he yet have his appointment with Dr. Trevino?  Very important to have him seen by cardiology.  Dr trevino is also who prescribes his megace.

## 2025-02-03 NOTE — ADDENDUM NOTE
Addended by: MISSY MORROW on: 2/2/2025 08:37 PM     Modules accepted: Orders     Dr Horne reviewed echo images to ensure preserved graft function in the setting of symptoms and sub-therapeutic drug levels-LVEF WNL    Attempted to contact patient with preliminary echo result, but unable to reach    Due for repeat labs   Will await final echo report

## 2025-02-05 ENCOUNTER — REFERRAL TRIAGE (OUTPATIENT)
Dept: CASE MANAGEMENT | Facility: OTHER | Age: 89
End: 2025-02-05
Payer: MEDICARE

## 2025-02-10 ENCOUNTER — PATIENT OUTREACH (OUTPATIENT)
Dept: CASE MANAGEMENT | Facility: OTHER | Age: 89
End: 2025-02-10
Payer: MEDICARE

## 2025-02-10 DIAGNOSIS — N40.0 BENIGN PROSTATIC HYPERPLASIA WITHOUT LOWER URINARY TRACT SYMPTOMS: Primary | ICD-10-CM

## 2025-02-10 DIAGNOSIS — F03.B3 MODERATE DEMENTIA WITH MOOD DISTURBANCE, UNSPECIFIED DEMENTIA TYPE: ICD-10-CM

## 2025-02-10 DIAGNOSIS — N40.0 BENIGN PROSTATIC HYPERPLASIA WITHOUT LOWER URINARY TRACT SYMPTOMS: ICD-10-CM

## 2025-02-10 RX ORDER — TERAZOSIN 2 MG/1
2 CAPSULE ORAL DAILY
Qty: 90 CAPSULE | Refills: 0 | OUTPATIENT
Start: 2025-02-10

## 2025-02-10 NOTE — OUTREACH NOTE
AMBULATORY CASE MANAGEMENT NOTE    Names and Relationships of Patient/Support Persons: Contact: Regine Last; Relationship: Emergency Contact -     Received a referral from PCP regarding patient.  Verbal consent is given by daughter/caregiver Regine Last.    She reports that he is doing fairly well for all that he has had to endure the past 18 months.    He is at home with a caregiver during the daytime hours and at night the children stay over and take turns.  They have cameras installed also.  He does not wear a fall detection because with his dementia he may not know to push the button.    He has a wonderful network of support around him.   He is slowly losing weight which is common with folks with dementia.   Regine had a couple of medication questions.  She assumes responsibility for his medications and the sister does her mothers.  She has reached out to the cardiologist regarding his cardiac meds.  She is requesting a refill on his Tamsulosin.    I inquired about the Terazosin that was refused today.  Regine reports that he is taking both.  Reviewed urology records and it is not reported and that medication is not listed in the chart.  Duplicate therapy is what PCP notes.  Sent PCP refill request for Tamsulosin.   Reviewed upcoming appointment with cardiology.   Information given on how to reach ACM.      Michelle GOEL  Ambulatory Case Management    2/10/2025, 13:35 EST

## 2025-02-10 NOTE — PLAN OF CARE
Problem: Dementia  Goal: Care for the Caregiver  Outcome: Progressing  Goal: Keeping Myself/My Loved One Safe  Outcome: Progressing  Goal: Prevent Falls  Outcome: Progressing  Goal: Optimal Care Coordination of a Patient Experiencing Dementia  Outcome: Progressing  Intervention: Develop Strategies to Manage Behavior  Flowsheets (Taken 2/10/2025 1330)  Develop Strategies to Manage Behavior: medication list reviewed  Intervention: Recognize and Manage Caregiver Stress  Flowsheets (Taken 2/10/2025 1330)  Recognize and Manage Caregiver Stress: caregiver stress acknowledged  Intervention: Identify and Reduce Risks for Harm or Injury  Flowsheets (Taken 2/10/2025 1330)  Identify and Reduce Risks for Harm or Injury:   assistive or adaptive device use encouraged   barriers to safety identified   modification of home and work environment promoted  Intervention: Alleviate Barriers to Optimal Nutrition  Flowsheets (Taken 2/10/2025 1330)  Alleviate Barriers to Optimal Nutrition: weight gain or loss reviewed and trended  Intervention: Support Psychosocial Adjustment to Dementia Diagnosis  Flowsheets (Taken 2/10/2025 1330)  Support Psychosocial Adjustment to Dementia Diagnosis: caregiver support provided  Intervention: Facilitate Optimal Social and Functional Skills  Flowsheets (Taken 2/10/2025 1330)  Facilitate Optimal Social and Functional Skills: activity or exercise based on tolerance encouraged

## 2025-02-10 NOTE — TELEPHONE ENCOUNTER
Jo Ann, you last office note, Regine said that you discussed that you could take over his Tamsulosin prescription that was written by Dr. Hair Romero ( [facility]).  Pended to refill

## 2025-02-12 RX ORDER — TAMSULOSIN HYDROCHLORIDE 0.4 MG/1
0.4 CAPSULE ORAL DAILY
Qty: 90 CAPSULE | Refills: 0 | Status: SHIPPED | OUTPATIENT
Start: 2025-02-12

## 2025-02-14 ENCOUNTER — TELEPHONE (OUTPATIENT)
Dept: FAMILY MEDICINE CLINIC | Age: 89
End: 2025-02-14
Payer: MEDICARE

## 2025-02-14 NOTE — TELEPHONE ENCOUNTER
Okay to cancel overdue psa on 1/22, ordered/completed on 1/28, okay to cancel duplicate on 1/22. Please advsie. kp

## 2025-02-14 NOTE — TELEPHONE ENCOUNTER
----- Message from Nolberto CASTRO sent at 2/11/2025  9:20 AM EST -----    ----- Message -----  From: SYSTEM  Sent: 2/11/2025   1:19 AM EST  To: saira Meyers [facility]

## 2025-02-15 ENCOUNTER — READMISSION MANAGEMENT (OUTPATIENT)
Dept: CALL CENTER | Facility: HOSPITAL | Age: 89
End: 2025-02-15
Payer: MEDICARE

## 2025-02-16 NOTE — OUTREACH NOTE
Prep Survey      Flowsheet Row Responses   [facility] facility patient discharged from? Non-BH   Is LACE score < 7 ? Non- Discharge   Eligibility [facility]   Date of Admission 02/13/25   Date of Discharge 02/15/25   Discharge Disposition Home or Self Care   Discharge diagnosis Stroke-like symptoms   stroke, carotid stenosis   Does the patient have one of the following disease processes/diagnoses(primary or secondary)? Stroke   Does the patient have Home health ordered? No   Is there a DME ordered? No   Prep survey completed? Yes            VIKTOR ZAPATA - Registered Nurse

## 2025-02-17 ENCOUNTER — TRANSITIONAL CARE MANAGEMENT TELEPHONE ENCOUNTER (OUTPATIENT)
Dept: CALL CENTER | Facility: HOSPITAL | Age: 89
End: 2025-02-17
Payer: MEDICARE

## 2025-02-17 NOTE — OUTREACH NOTE
Call Center TCM Note      Flowsheet Row Responses   [facility] patient discharged from? Non-  [[facility]]   Does the patient have one of the following disease processes/diagnoses(primary or secondary)? Other   TCM attempt successful? No  [vr for Marguerite, wife as well at Regine and Sonal, shashi]   Unsuccessful attempts Attempt 1  [spoke with tomi Weiner who reports that pt is still admitted at [facility], likely discharge today but plans are home with Hospice]            Lissa Hardwick RN    2/17/2025, 09:35 EST

## 2025-02-18 ENCOUNTER — TELEPHONE (OUTPATIENT)
Dept: FAMILY MEDICINE CLINIC | Age: 89
End: 2025-02-18
Payer: MEDICARE

## 2025-02-18 DIAGNOSIS — I67.9 CEREBRAL VASCULAR DISEASE: Primary | ICD-10-CM

## 2025-02-18 RX ORDER — MORPHINE SULFATE 100 MG/5ML
5 SOLUTION ORAL EVERY 4 HOURS PRN
Qty: 30 ML | Refills: 0 | Status: SHIPPED | OUTPATIENT
Start: 2025-02-18

## 2025-02-18 RX ORDER — LORAZEPAM 2 MG/ML
1 CONCENTRATE ORAL EVERY 4 HOURS PRN
Qty: 30 ML | Refills: 0 | Status: SHIPPED | OUTPATIENT
Start: 2025-02-18

## 2025-02-18 NOTE — TELEPHONE ENCOUNTER
Admitted to hospice  Diagnosis cerebrovascular disease, 20 pound weight loss in 4 months  Failure to thrive    mas

## 2025-02-19 ENCOUNTER — TELEPHONE (OUTPATIENT)
Dept: FAMILY MEDICINE CLINIC | Age: 89
End: 2025-02-19
Payer: MEDICARE

## 2025-02-19 ENCOUNTER — READMISSION MANAGEMENT (OUTPATIENT)
Dept: CALL CENTER | Facility: HOSPITAL | Age: 89
End: 2025-02-19
Payer: MEDICARE

## 2025-02-19 NOTE — TELEPHONE ENCOUNTER
Message from 1-  Molly Friend APRN Johnson, Laura H, APRN; P [facility]  Cc: Gala Duff; Yaron Trevino, DO; Shanae Granda, MSW  I am okay with decreasing or holding statin while determining cause of elevated LFTs from neurology standpoint.

## 2025-02-19 NOTE — OUTREACH NOTE
Prep Survey      Flowsheet Row Responses   [facility] facility patient discharged from? Non-BH   Is LACE score < 7 ? Non-BH Discharge   Eligibility Not Eligible   What are the reasons patient is not eligible? Hospice/Pallative Care  [Discharge Disposition: Hospice/Home]   Does the patient have one of the following disease processes/diagnoses(primary or secondary)? Other   Prep survey completed? Yes            Carmenza Srivastava Registered Nurse

## 2025-02-25 DIAGNOSIS — N40.0 BENIGN PROSTATIC HYPERPLASIA WITHOUT LOWER URINARY TRACT SYMPTOMS: ICD-10-CM

## 2025-02-25 RX ORDER — TERAZOSIN 2 MG/1
2 CAPSULE ORAL DAILY
Qty: 90 CAPSULE | Refills: 0 | OUTPATIENT
Start: 2025-02-25

## 2025-03-07 DIAGNOSIS — D64.9 ANEMIA, UNSPECIFIED TYPE: ICD-10-CM

## 2025-03-07 RX ORDER — BUMETANIDE 1 MG/1
1 TABLET ORAL DAILY
Qty: 30 TABLET | Refills: 0 | Status: SHIPPED | OUTPATIENT
Start: 2025-03-07 | End: 2025-04-06

## 2025-03-10 RX ORDER — FERROUS SULFATE 324(65)MG
TABLET, DELAYED RELEASE (ENTERIC COATED) ORAL
Qty: 90 TABLET | Refills: 0 | OUTPATIENT
Start: 2025-03-10

## 2025-03-17 ENCOUNTER — TELEPHONE (OUTPATIENT)
Dept: FAMILY MEDICINE CLINIC | Age: 89
End: 2025-03-17
Payer: MEDICARE

## 2025-03-17 NOTE — TELEPHONE ENCOUNTER
Patient is admitted to hospice are you still wanting patient to complete overdue Hepatitis panel and Hepatic Function Panel form 2/28, please advise.

## 2025-03-17 NOTE — TELEPHONE ENCOUNTER
----- Message from Nolberto CASTRO sent at 3/17/2025  9:50 AM EDT -----    ----- Message -----  From: SYSTEM  Sent: 3/15/2025   1:29 AM EDT  To: saira Meyers [facility]

## 2025-03-24 DIAGNOSIS — N47.1 PHIMOSIS: Primary | ICD-10-CM

## 2025-03-24 RX ORDER — TRIAMCINOLONE ACETONIDE 1 MG/G
1 CREAM TOPICAL 2 TIMES DAILY PRN
Qty: 15 G | Refills: 0 | Status: SHIPPED | OUTPATIENT
Start: 2025-03-24

## 2025-03-24 RX ORDER — NYSTATIN 100000 U/G
1 CREAM TOPICAL 4 TIMES DAILY PRN
Qty: 30 G | Refills: 0 | Status: SHIPPED | OUTPATIENT
Start: 2025-03-24

## 2025-04-02 ENCOUNTER — TELEPHONE (OUTPATIENT)
Dept: FAMILY MEDICINE CLINIC | Age: 89
End: 2025-04-02
Payer: MEDICARE

## 2025-04-02 NOTE — TELEPHONE ENCOUNTER
Spoke with hospice nurse Meseret  Patient having some issues with constipation  If no success at bowel movement before tomorrow may use fleets enema

## 2025-04-04 DIAGNOSIS — G47.00 INSOMNIA, UNSPECIFIED TYPE: ICD-10-CM

## 2025-04-04 DIAGNOSIS — F32.89 OTHER DEPRESSION: ICD-10-CM

## 2025-04-04 RX ORDER — VENLAFAXINE HYDROCHLORIDE 37.5 MG/1
37.5 CAPSULE, EXTENDED RELEASE ORAL DAILY
Qty: 30 CAPSULE | Refills: 1 | Status: SHIPPED | OUTPATIENT
Start: 2025-04-04

## 2025-04-04 RX ORDER — HYDROXYZINE HYDROCHLORIDE 10 MG/1
10 TABLET, FILM COATED ORAL NIGHTLY PRN
Qty: 30 TABLET | Refills: 1 | Status: SHIPPED | OUTPATIENT
Start: 2025-04-04

## 2025-04-04 RX ORDER — BUMETANIDE 1 MG/1
1 TABLET ORAL DAILY
Qty: 30 TABLET | Refills: 0 | OUTPATIENT
Start: 2025-04-04 | End: 2025-05-04

## 2025-04-11 DIAGNOSIS — E03.9 ACQUIRED HYPOTHYROIDISM: ICD-10-CM

## 2025-04-11 RX ORDER — LEVOTHYROXINE SODIUM 50 UG/1
50 TABLET ORAL EVERY MORNING
Qty: 90 TABLET | Refills: 0 | Status: SHIPPED | OUTPATIENT
Start: 2025-04-11

## 2025-05-06 DIAGNOSIS — N40.0 BENIGN PROSTATIC HYPERPLASIA WITHOUT LOWER URINARY TRACT SYMPTOMS: ICD-10-CM

## 2025-05-06 DIAGNOSIS — R60.0 LOCALIZED EDEMA: ICD-10-CM

## 2025-05-07 RX ORDER — POTASSIUM CHLORIDE 750 MG/1
TABLET, EXTENDED RELEASE ORAL
Qty: 60 TABLET | Refills: 2 | Status: SHIPPED | OUTPATIENT
Start: 2025-05-07

## 2025-05-07 RX ORDER — TAMSULOSIN HYDROCHLORIDE 0.4 MG/1
1 CAPSULE ORAL DAILY
Qty: 30 CAPSULE | Refills: 0 | Status: SHIPPED | OUTPATIENT
Start: 2025-05-07

## 2025-05-07 NOTE — TELEPHONE ENCOUNTER
This looks like this was stopped in Oct 2024, not sure if he is still on this or not, this is a Hospice pt

## 2025-05-23 RX ORDER — CLOPIDOGREL BISULFATE 75 MG/1
75 TABLET ORAL DAILY
Qty: 30 TABLET | Refills: 5 | Status: SHIPPED | OUTPATIENT
Start: 2025-05-23

## 2025-05-30 DIAGNOSIS — F32.89 OTHER DEPRESSION: ICD-10-CM

## 2025-05-30 DIAGNOSIS — G47.00 INSOMNIA, UNSPECIFIED TYPE: ICD-10-CM

## 2025-05-30 RX ORDER — VENLAFAXINE HYDROCHLORIDE 37.5 MG/1
37.5 CAPSULE, EXTENDED RELEASE ORAL DAILY
Qty: 30 CAPSULE | Refills: 0 | Status: SHIPPED | OUTPATIENT
Start: 2025-05-30

## 2025-05-30 RX ORDER — HYDROXYZINE HYDROCHLORIDE 10 MG/1
10 TABLET, FILM COATED ORAL NIGHTLY PRN
Qty: 30 TABLET | Refills: 0 | Status: SHIPPED | OUTPATIENT
Start: 2025-05-30

## 2025-06-12 DIAGNOSIS — N40.0 BENIGN PROSTATIC HYPERPLASIA WITHOUT LOWER URINARY TRACT SYMPTOMS: ICD-10-CM

## 2025-06-12 RX ORDER — TAMSULOSIN HYDROCHLORIDE 0.4 MG/1
1 CAPSULE ORAL DAILY
Qty: 30 CAPSULE | Refills: 0 | Status: SHIPPED | OUTPATIENT
Start: 2025-06-12

## 2025-06-20 DIAGNOSIS — N40.0 BENIGN PROSTATIC HYPERPLASIA WITHOUT LOWER URINARY TRACT SYMPTOMS: ICD-10-CM

## 2025-06-20 RX ORDER — TAMSULOSIN HYDROCHLORIDE 0.4 MG/1
1 CAPSULE ORAL DAILY
Qty: 90 CAPSULE | Refills: 0 | OUTPATIENT
Start: 2025-06-20

## 2025-06-24 RX ORDER — BUMETANIDE 1 MG/1
1 TABLET ORAL DAILY
Qty: 30 TABLET | Refills: 0 | OUTPATIENT
Start: 2025-06-24 | End: 2025-07-24

## 2025-06-26 DIAGNOSIS — I67.9 CEREBRAL VASCULAR DISEASE: ICD-10-CM

## 2025-06-26 DIAGNOSIS — R60.0 LOCALIZED EDEMA: Primary | ICD-10-CM

## 2025-06-26 RX ORDER — BUMETANIDE 1 MG/1
1 TABLET ORAL DAILY
Qty: 30 TABLET | Refills: 0 | Status: SHIPPED | OUTPATIENT
Start: 2025-06-26 | End: 2025-07-26

## 2025-07-04 DIAGNOSIS — F32.89 OTHER DEPRESSION: ICD-10-CM

## 2025-07-04 DIAGNOSIS — G47.00 INSOMNIA, UNSPECIFIED TYPE: ICD-10-CM

## 2025-07-07 RX ORDER — VENLAFAXINE HYDROCHLORIDE 37.5 MG/1
37.5 CAPSULE, EXTENDED RELEASE ORAL DAILY
Qty: 30 CAPSULE | Refills: 2 | Status: SHIPPED | OUTPATIENT
Start: 2025-07-07

## 2025-07-07 RX ORDER — HYDROXYZINE HYDROCHLORIDE 10 MG/1
10 TABLET, FILM COATED ORAL NIGHTLY PRN
Qty: 30 TABLET | Refills: 2 | Status: SHIPPED | OUTPATIENT
Start: 2025-07-07

## 2025-07-13 DIAGNOSIS — N40.0 BENIGN PROSTATIC HYPERPLASIA WITHOUT LOWER URINARY TRACT SYMPTOMS: ICD-10-CM

## 2025-07-14 RX ORDER — TAMSULOSIN HYDROCHLORIDE 0.4 MG/1
1 CAPSULE ORAL DAILY
Qty: 30 CAPSULE | Refills: 0 | Status: SHIPPED | OUTPATIENT
Start: 2025-07-14

## 2025-07-22 DIAGNOSIS — I67.9 CEREBRAL VASCULAR DISEASE: ICD-10-CM

## 2025-07-22 RX ORDER — LORAZEPAM 2 MG/ML
1 CONCENTRATE ORAL EVERY 4 HOURS PRN
Qty: 30 ML | Refills: 0 | Status: SHIPPED | OUTPATIENT
Start: 2025-07-22

## 2025-07-22 RX ORDER — MORPHINE SULFATE 100 MG/5ML
5 SOLUTION ORAL EVERY 4 HOURS PRN
Qty: 30 ML | Refills: 0 | Status: SHIPPED | OUTPATIENT
Start: 2025-07-22

## 2025-07-27 DIAGNOSIS — I67.9 CEREBRAL VASCULAR DISEASE: ICD-10-CM

## 2025-07-27 DIAGNOSIS — R60.0 LOCALIZED EDEMA: ICD-10-CM

## 2025-07-28 DIAGNOSIS — E03.9 ACQUIRED HYPOTHYROIDISM: ICD-10-CM

## 2025-07-28 RX ORDER — LEVOTHYROXINE SODIUM 50 UG/1
50 TABLET ORAL EVERY MORNING
Qty: 90 TABLET | Refills: 0 | Status: SHIPPED | OUTPATIENT
Start: 2025-07-28

## 2025-07-28 RX ORDER — BUMETANIDE 1 MG/1
1 TABLET ORAL DAILY
Qty: 30 TABLET | Refills: 0 | OUTPATIENT
Start: 2025-07-28 | End: 2025-08-27

## 2025-08-01 DIAGNOSIS — R60.0 LOCALIZED EDEMA: ICD-10-CM

## 2025-08-01 RX ORDER — POTASSIUM CHLORIDE 750 MG/1
TABLET, EXTENDED RELEASE ORAL
Qty: 60 TABLET | Refills: 1 | Status: SHIPPED | OUTPATIENT
Start: 2025-08-01

## 2025-08-11 ENCOUNTER — TELEPHONE (OUTPATIENT)
Dept: ADMINISTRATIVE | Facility: OTHER | Age: 89
End: 2025-08-11
Payer: MEDICARE

## 2025-08-13 ENCOUNTER — TELEPHONE (OUTPATIENT)
Dept: FAMILY MEDICINE CLINIC | Age: 89
End: 2025-08-13
Payer: MEDICARE

## 2025-08-13 DIAGNOSIS — F03.90 DEMENTIA WITHOUT BEHAVIORAL DISTURBANCE: ICD-10-CM

## 2025-08-13 DIAGNOSIS — I48.91 ATRIAL FIBRILLATION, UNSPECIFIED TYPE: ICD-10-CM

## 2025-08-13 DIAGNOSIS — R53.1 GENERALIZED WEAKNESS: ICD-10-CM

## 2025-08-13 DIAGNOSIS — I67.9 CEREBRAL VASCULAR DISEASE: Primary | ICD-10-CM

## 2025-08-13 DIAGNOSIS — Z85.46 HISTORY OF PROSTATE CANCER: ICD-10-CM

## 2025-08-13 DIAGNOSIS — E03.9 ACQUIRED HYPOTHYROIDISM: ICD-10-CM

## 2025-08-13 DIAGNOSIS — D64.9 ANEMIA, UNSPECIFIED TYPE: ICD-10-CM

## 2025-08-14 DIAGNOSIS — N40.0 BENIGN PROSTATIC HYPERPLASIA WITHOUT LOWER URINARY TRACT SYMPTOMS: ICD-10-CM

## 2025-08-14 RX ORDER — TAMSULOSIN HYDROCHLORIDE 0.4 MG/1
1 CAPSULE ORAL DAILY
Qty: 90 CAPSULE | Refills: 0 | Status: SHIPPED | OUTPATIENT
Start: 2025-08-14

## 2025-08-21 DIAGNOSIS — K21.9 GASTROESOPHAGEAL REFLUX DISEASE WITHOUT ESOPHAGITIS: ICD-10-CM

## 2025-08-22 ENCOUNTER — TELEPHONE (OUTPATIENT)
Dept: FAMILY MEDICINE CLINIC | Age: 89
End: 2025-08-22
Payer: MEDICARE

## 2025-08-25 RX ORDER — PANTOPRAZOLE SODIUM 40 MG/1
40 TABLET, DELAYED RELEASE ORAL EVERY MORNING
Qty: 90 TABLET | Refills: 0 | Status: SHIPPED | OUTPATIENT
Start: 2025-08-25

## 2025-08-28 ENCOUNTER — NURSING HOME (OUTPATIENT)
Dept: FAMILY MEDICINE CLINIC | Age: 89
End: 2025-08-28
Payer: MEDICARE

## 2025-08-28 ENCOUNTER — TELEPHONE (OUTPATIENT)
Dept: FAMILY MEDICINE CLINIC | Age: 89
End: 2025-08-28

## 2025-08-28 VITALS — DIASTOLIC BLOOD PRESSURE: 59 MMHG | HEART RATE: 65 BPM | SYSTOLIC BLOOD PRESSURE: 114 MMHG

## 2025-08-28 DIAGNOSIS — E03.9 ACQUIRED HYPOTHYROIDISM: ICD-10-CM

## 2025-08-28 DIAGNOSIS — I65.23 BILATERAL CAROTID ARTERY STENOSIS: Primary | ICD-10-CM

## 2025-08-28 DIAGNOSIS — I48.11 LONGSTANDING PERSISTENT ATRIAL FIBRILLATION: ICD-10-CM

## 2025-08-28 DIAGNOSIS — G47.00 INSOMNIA, UNSPECIFIED TYPE: ICD-10-CM

## 2025-08-28 DIAGNOSIS — I67.9 CEREBRAL VASCULAR DISEASE: ICD-10-CM

## 2025-08-28 DIAGNOSIS — D64.9 ANEMIA, UNSPECIFIED TYPE: ICD-10-CM

## 2025-08-28 DIAGNOSIS — R60.0 LOCALIZED EDEMA: ICD-10-CM

## 2025-08-28 DIAGNOSIS — I95.1 ORTHOSTASIS: ICD-10-CM

## 2025-08-28 DIAGNOSIS — R53.1 GENERALIZED WEAKNESS: ICD-10-CM

## 2025-08-28 DIAGNOSIS — Z86.73 HISTORY OF CVA (CEREBROVASCULAR ACCIDENT): ICD-10-CM

## 2025-08-28 DIAGNOSIS — H61.92 SKIN LESION OF LEFT EAR: ICD-10-CM

## 2025-08-28 DIAGNOSIS — I27.20 PULMONARY HYPERTENSION: ICD-10-CM

## 2025-08-28 DIAGNOSIS — N47.1 PHIMOSIS: ICD-10-CM

## 2025-08-28 PROBLEM — I50.9 CHF (CONGESTIVE HEART FAILURE): Status: ACTIVE | Noted: 2025-08-28

## 2025-08-28 RX ORDER — TRIAMCINOLONE ACETONIDE 1 MG/G
1 CREAM TOPICAL 2 TIMES DAILY PRN
Start: 2025-08-28

## 2025-08-28 RX ORDER — POLYETHYLENE GLYCOL 3350 17 G/17G
17 POWDER, FOR SOLUTION ORAL DAILY PRN
Start: 2025-08-28

## 2025-08-28 RX ORDER — HYDROXYZINE HYDROCHLORIDE 10 MG/1
10 TABLET, FILM COATED ORAL NIGHTLY
Start: 2025-08-28

## 2025-08-28 RX ORDER — POTASSIUM CHLORIDE 750 MG/1
TABLET, EXTENDED RELEASE ORAL
Start: 2025-08-28

## 2025-08-28 RX ORDER — MIDODRINE HYDROCHLORIDE 10 MG/1
10 TABLET ORAL 2 TIMES DAILY
Start: 2025-08-28

## 2025-08-28 RX ORDER — BUMETANIDE 1 MG/1
1 TABLET ORAL DAILY
Start: 2025-08-28

## 2025-08-28 RX ORDER — ROSUVASTATIN CALCIUM 5 MG/1
5 TABLET, COATED ORAL NIGHTLY
Qty: 90 TABLET | Refills: 1 | Status: SHIPPED | OUTPATIENT
Start: 2025-08-28

## (undated) DEVICE — SPNG GZ WOVN 4X4IN 12PLY 10/BX STRL

## (undated) DEVICE — SUT SILK 4/0 TIES 18IN A183H

## (undated) DEVICE — SYR LUERLOK 5CC

## (undated) DEVICE — TOWEL,OR,DSP,ST,BLUE,STD,4/PK,20PK/CS: Brand: MEDLINE

## (undated) DEVICE — SOL NACL 0.9PCT 1000ML

## (undated) DEVICE — SUT PROLN 6/0 BV1 D/A 30IN 8709H

## (undated) DEVICE — SUT POLY BR TP 2STRND 1/8X30IN

## (undated) DEVICE — PK ENDART CARTOID 40

## (undated) DEVICE — GW ENROUTE HC .014IN 95CM

## (undated) DEVICE — SUT PROLN 5/0 RB1 D/A 36IN 8556H

## (undated) DEVICE — GOWN,REINF,POLY,SIRUS,BRTH SLV,XLNG/XXL: Brand: MEDLINE

## (undated) DEVICE — CATHETER,URETHRAL,REDRUBBER,STERILE,20FR: Brand: MEDLINE

## (undated) DEVICE — 3M™ IOBAN™ 2 ANTIMICROBIAL INCISE DRAPE 6640EZ: Brand: IOBAN™ 2

## (undated) DEVICE — SHLD ANGIO 2LAYR CIR FEN

## (undated) DEVICE — EQUIPMENT COVER BAG TYPE 48” X 36” (122CM X 91CM): Brand: EQUIPMENT COVER BAG TYPE

## (undated) DEVICE — INFLATION DEVICE: Brand: ENCORE™ 26

## (undated) DEVICE — UNDERGLV SURG BIOGEL INDICAT PI SZ8.5 BLU

## (undated) DEVICE — KT INTRO MIC TROC 4F 21GA 7CM

## (undated) DEVICE — SUT VIC 4/0 SH 27IN J415H

## (undated) DEVICE — SUT SILK 2/0 TIES 18IN A185H

## (undated) DEVICE — ANGLED-TIP ARTERIAL SHEATH CONFIGURATION: Brand: ENROUTE TRANSCAROTID NEUROPROTECTION SYSTEM

## (undated) DEVICE — RADIFOCUS GLIDEWIRE: Brand: GLIDEWIRE

## (undated) DEVICE — 5MM X 25MM: Brand: ENROUTE ENFLATE TRANSCAROTID RX BALLOON DILATATION CATHETER

## (undated) DEVICE — ST ACC MICROPUNCTURE STFF .018 ECHO/PLDM/TP 4F/10CM 21G/7CM

## (undated) DEVICE — Device

## (undated) DEVICE — ANTIBACTERIAL UNDYED BRAIDED (POLYGLACTIN 910), SYNTHETIC ABSORBABLE SUTURE: Brand: COATED VICRYL

## (undated) DEVICE — NDL HYPO PRECISIONGLIDE REG 25G 1 1/2

## (undated) DEVICE — TRAP FLD MINIVAC MEGADYNE 100ML

## (undated) DEVICE — PCH INST SURG INVISISHIELD 2PCKT

## (undated) DEVICE — APPL CHLORAPREP HI/LITE 26ML ORNG

## (undated) DEVICE — DRSNG SURESITE WNDW 4X4.5

## (undated) DEVICE — SYR LL TP 10ML STRL

## (undated) DEVICE — TBG PENCL TELESCP MEGADYNE SMOKE EVAC 10FT

## (undated) DEVICE — STPCK 3/WY HP M/RA W/OFF/HNDL 1050PSI STRL

## (undated) DEVICE — GW TORQFLX SS .018IN 40CM

## (undated) DEVICE — SUT SILK 3/0 SH CR5 18IN C0135

## (undated) DEVICE — SUT SILK 3/0 TIES 18IN A184H

## (undated) DEVICE — VESSEL LOOPS X-RAY DETECTABLE: Brand: DEROYAL

## (undated) DEVICE — GLV SURG BIOGEL LTX PF 8 1/2

## (undated) DEVICE — ADHS SKIN SURG TISS VISC PREMIERPRO EXOFIN HI/VISC FAST/DRY